# Patient Record
Sex: MALE | Race: WHITE | NOT HISPANIC OR LATINO | Employment: OTHER | URBAN - METROPOLITAN AREA
[De-identification: names, ages, dates, MRNs, and addresses within clinical notes are randomized per-mention and may not be internally consistent; named-entity substitution may affect disease eponyms.]

---

## 2020-01-13 ENCOUNTER — TELEPHONE (OUTPATIENT)
Dept: FAMILY MEDICINE CLINIC | Facility: CLINIC | Age: 80
End: 2020-01-13

## 2020-01-13 NOTE — TELEPHONE ENCOUNTER
Pt has made an appointment and signed release at previous office.     Pt is requesting hydrocodone 10-325mg take 4 daily to [facility].

## 2020-01-14 DIAGNOSIS — M16.9 HIP ARTHROSIS: Primary | ICD-10-CM

## 2020-01-14 RX ORDER — HYDROCODONE BITARTRATE AND ACETAMINOPHEN 10; 325 MG/1; MG/1
1 TABLET ORAL EVERY 6 HOURS PRN
Qty: 120 TABLET | Refills: 0 | Status: SHIPPED | OUTPATIENT
Start: 2020-01-14 | End: 2020-02-12 | Stop reason: SDUPTHER

## 2020-01-16 ENCOUNTER — PRIOR AUTHORIZATION (OUTPATIENT)
Dept: FAMILY MEDICINE CLINIC | Facility: CLINIC | Age: 80
End: 2020-01-16

## 2020-01-30 RX ORDER — HYDROCHLOROTHIAZIDE 12.5 MG/1
12.5 TABLET ORAL DAILY
Qty: 90 TABLET | Refills: 3 | Status: SHIPPED | OUTPATIENT
Start: 2020-01-30 | End: 2021-01-25 | Stop reason: SDUPTHER

## 2020-02-12 ENCOUNTER — OFFICE VISIT (OUTPATIENT)
Dept: FAMILY MEDICINE CLINIC | Facility: CLINIC | Age: 80
End: 2020-02-12

## 2020-02-12 VITALS
DIASTOLIC BLOOD PRESSURE: 82 MMHG | HEART RATE: 88 BPM | RESPIRATION RATE: 12 BRPM | TEMPERATURE: 98.1 F | WEIGHT: 206 LBS | BODY MASS INDEX: 33.11 KG/M2 | OXYGEN SATURATION: 97 % | SYSTOLIC BLOOD PRESSURE: 130 MMHG | HEIGHT: 66 IN

## 2020-02-12 DIAGNOSIS — F41.1 GAD (GENERALIZED ANXIETY DISORDER): ICD-10-CM

## 2020-02-12 DIAGNOSIS — N18.30 STAGE 3 CHRONIC KIDNEY DISEASE (HCC): ICD-10-CM

## 2020-02-12 DIAGNOSIS — N18.30 CHRONIC KIDNEY DISEASE, STAGE 3 (MODERATE): ICD-10-CM

## 2020-02-12 DIAGNOSIS — Z79.899 HIGH RISK MEDICATION USE: ICD-10-CM

## 2020-02-12 DIAGNOSIS — M48.061 DEGENERATIVE LUMBAR SPINAL STENOSIS: Primary | ICD-10-CM

## 2020-02-12 DIAGNOSIS — F32.0 CURRENT MILD EPISODE OF MAJOR DEPRESSIVE DISORDER WITHOUT PRIOR EPISODE (HCC): ICD-10-CM

## 2020-02-12 DIAGNOSIS — M16.9 HIP ARTHROSIS: ICD-10-CM

## 2020-02-12 DIAGNOSIS — E55.9 VITAMIN D DEFICIENCY: ICD-10-CM

## 2020-02-12 DIAGNOSIS — G47.33 OBSTRUCTIVE SLEEP APNEA SYNDROME: ICD-10-CM

## 2020-02-12 DIAGNOSIS — R53.83 OTHER FATIGUE: ICD-10-CM

## 2020-02-12 DIAGNOSIS — I10 ESSENTIAL HYPERTENSION: ICD-10-CM

## 2020-02-12 PROBLEM — F32.9 CURRENT EPISODE OF MAJOR DEPRESSIVE DISORDER WITHOUT PRIOR EPISODE: Status: ACTIVE | Noted: 2020-02-12

## 2020-02-12 PROBLEM — J43.8 OTHER EMPHYSEMA: Status: ACTIVE | Noted: 2020-02-12

## 2020-02-12 PROBLEM — R60.9 EDEMA: Status: ACTIVE | Noted: 2020-02-12

## 2020-02-12 PROBLEM — M1A.09X0 CHRONIC IDIOPATHIC GOUT OF MULTIPLE SITES: Status: ACTIVE | Noted: 2020-02-12

## 2020-02-12 PROBLEM — G47.30 SLEEP APNEA: Status: ACTIVE | Noted: 2020-02-12

## 2020-02-12 PROBLEM — K21.9 GERD (GASTROESOPHAGEAL REFLUX DISEASE): Status: ACTIVE | Noted: 2020-02-12

## 2020-02-12 PROCEDURE — 99214 OFFICE O/P EST MOD 30 MIN: CPT | Performed by: FAMILY MEDICINE

## 2020-02-12 RX ORDER — OMEPRAZOLE 20 MG/1
20 CAPSULE, DELAYED RELEASE ORAL DAILY
COMMUNITY
Start: 2019-12-10 | End: 2020-05-19 | Stop reason: SDUPTHER

## 2020-02-12 RX ORDER — ROSUVASTATIN CALCIUM 20 MG/1
20 TABLET, COATED ORAL DAILY
COMMUNITY
End: 2020-11-20 | Stop reason: SDUPTHER

## 2020-02-12 RX ORDER — ALLOPURINOL 100 MG/1
100 TABLET ORAL DAILY
COMMUNITY
Start: 2020-02-05 | End: 2020-04-23 | Stop reason: SDUPTHER

## 2020-02-12 RX ORDER — HYDROCODONE BITARTRATE AND ACETAMINOPHEN 10; 325 MG/1; MG/1
1 TABLET ORAL EVERY 6 HOURS PRN
Qty: 120 TABLET | Refills: 0 | Status: SHIPPED | OUTPATIENT
Start: 2020-02-12 | End: 2020-03-16 | Stop reason: SDUPTHER

## 2020-02-12 RX ORDER — SPIRONOLACTONE 25 MG/1
25 TABLET ORAL DAILY
COMMUNITY
Start: 2019-12-20 | End: 2020-05-19 | Stop reason: SDUPTHER

## 2020-02-12 RX ORDER — ASPIRIN 325 MG
325 TABLET ORAL DAILY
COMMUNITY
Start: 2014-01-30

## 2020-02-12 RX ORDER — DULOXETIN HYDROCHLORIDE 30 MG/1
30 CAPSULE, DELAYED RELEASE ORAL DAILY
COMMUNITY
Start: 2019-11-05 | End: 2020-03-16 | Stop reason: SDUPTHER

## 2020-02-12 RX ORDER — FUROSEMIDE 20 MG/1
20 TABLET ORAL DAILY
COMMUNITY
Start: 2020-02-05 | End: 2020-11-20

## 2020-02-12 RX ORDER — UBIDECARENONE 100 MG
100 CAPSULE ORAL DAILY
COMMUNITY

## 2020-02-12 RX ORDER — POTASSIUM CHLORIDE 750 MG/1
10 TABLET, EXTENDED RELEASE ORAL DAILY
COMMUNITY
Start: 2020-02-05 | End: 2023-01-31

## 2020-02-12 RX ORDER — MULTIVITAMIN WITH IRON
2 TABLET ORAL DAILY
COMMUNITY

## 2020-02-12 RX ORDER — CHLORAL HYDRATE 500 MG
CAPSULE ORAL
COMMUNITY

## 2020-02-12 RX ORDER — CHOLECALCIFEROL (VITAMIN D3) 50 MCG
2000 TABLET ORAL DAILY
COMMUNITY

## 2020-02-12 NOTE — PROGRESS NOTES
"Subjective   Kike Rubio is a 79 y.o. male.     Vitals:    02/12/20 1351   BP: 130/82   Pulse: 88   Resp: 12   Temp: 98.1 °F (36.7 °C)   SpO2: 97%        Chief Complaint   Patient presents with   • Establish Care     patient is establishing a care   • Hypertension     patient has a follow up   • Arthritis        History of Present Illness    The following portions of the patient's history were reviewed and updated as appropriate: allergies, current medications, past family history, past medical history, past social history, past surgical history and problem list.    2-month follow-up and chronic low back pain, kidney disease, and new complaints of fatigue and depressed mood  ----also, to establish care with me here at [facility]    Last office visit 12/2019 at [place] at that time we are following up on lipids and renal function.  Lipids within normal limits and no changes were made to medications however patient's renal function deteriorated slightly.  Thus, he was asked to DC his Mobic or take very sparingly as he has been asked to stay off this in the past.  However, his arthritis is so bad that he still at times take the Mobic.  Since last office visit he is taking it about 3-4 times.  For this reason he continues to also take his hydrocodone 4 times a day for his chronic lumbar degenerative disc disease and severe osteoarthritis of knees and hips.  He currently undergoes epidural injections with Dr. Phillips which are successful but only seem to last for 1 to 2 months.  He does have a follow-up next week with him and will be considering another MRI and possibly other type of therapeutic injections.    Most recently Kike is been feeling a little more fatigued, depressed mood, and decreased motivation.  It has been a stressful time with his wife undergoing a lot of testing for \"myeloproliferative disorder\".  However, usually Kike can get up and go and has no problem getting his daily tasks done.  The symptoms have " been going on approximately 2 to 3 months now.  Denies changes in weight, chest pain, shortness of air.  There is to be sleeping more during the daytime.  Compliant with his CPAP for his obstructive sleep apnea which does help his fatigue.    Review of Systems   Constitutional: Positive for fatigue. Negative for unexpected weight gain and unexpected weight loss.   Respiratory: Negative for shortness of breath.    Cardiovascular: Negative for chest pain.   Musculoskeletal: Positive for arthralgias and back pain.   I have reviewed and confirmed the accuracy of the ROS as documented by the MA/LPN/RN Kina Morin MD      Objective   Physical Exam   Constitutional: He appears well-developed and well-nourished.   HENT:   Head: Normocephalic and atraumatic.   Eyes: Pupils are equal, round, and reactive to light. Conjunctivae are normal.   Neck: Normal range of motion. Neck supple. No thyromegaly present.   Cardiovascular: Normal rate, regular rhythm and normal heart sounds.   Pulmonary/Chest: Effort normal and breath sounds normal.   Abdominal: Soft. Bowel sounds are normal. He exhibits no distension. There is no hepatosplenomegaly. There is no tenderness.   Musculoskeletal:   Bilateral lower extremities notable for trace to 1+ edema, this is relatively stable if not improved   Lymphadenopathy:     He has no cervical adenopathy.   Psychiatric: He has a normal mood and affect. His behavior is normal. Judgment and thought content normal.   Nursing note and vitals reviewed.       LABS/STUDIES  --- 12/2019 lipids within normal limits, CMP within normal limits except for creatinine of 1.6      Assessment/Plan   Kike was seen today for establish care, hypertension and arthritis.    Diagnoses and all orders for this visit:    Degenerative lumbar spinal stenosis  ----Kunal done, tox screen will be updated today.  No change to medication/refill Norco quantity 120, continue with pain management for therapeutic injections  -      ToxASSURE Select 13 Discrete -    Hip arthrosis  -     HYDROcodone-acetaminophen (NORCO)  MG per tablet; Take 1 tablet by mouth Every 6 (Six) Hours As Needed for Moderate Pain .    Stage 3 chronic kidney disease (CMS/HCC)  --- stable?  Again, reiterate to avoid all anti-inflammatories.  Recheck BMP today  -     Basic Metabolic Panel    High risk medication use  --- will update yearly urine tox for compliance/last urine tox 3/2019  -     ToxASSURE Select 13 Discrete -    Other fatigue  --- new diagnosis, suspect multifactorial secondary to chronic kidney disease, age, medications.  Yet rule out other etiologies with some labs and treat mild MDD  -     CBC & Differential  -     TSH  -     Vitamin B12 & Folate  -     Vitamin D 25 Hydroxy    Vitamin D deficiency    Essential hypertension    FRAN (generalized anxiety disorder)    Obstructive sleep apnea syndrome    Current mild episode of major depressive disorder without prior episode (CMS/HCC)  --- new diagnosis, will increase Cymbalta to 60 mg 1 p.o. daily    Chronic kidney disease, stage 3 (moderate) (CMS/HCC)   -     Vitamin D 25 Hydroxy                   Return in about 3 months (around 5/12/2020).

## 2020-02-17 LAB
25(OH)D3+25(OH)D2 SERPL-MCNC: 56.1 NG/ML (ref 30–100)
6MAM UR QL CFM: NEGATIVE
6MAM/CREAT UR: NOT DETECTED NG/MG CREAT
7AMINOCLONAZEPAM/CREAT UR: NOT DETECTED NG/MG CREAT
A-OH ALPRAZ/CREAT UR: NOT DETECTED NG/MG CREAT
A-OH-TRIAZOLAM/CREAT UR CFM: NOT DETECTED NG/MG CREAT
ALFENTANIL/CREAT UR CFM: NOT DETECTED NG/MG CREAT
ALPHA-HYDROXYMIDAZOLAM, URINE: NOT DETECTED NG/MG CREAT
ALPRAZ/CREAT UR CFM: NOT DETECTED NG/MG CREAT
AMOBARBITAL UR QL CFM: NOT DETECTED
AMPHET/CREAT UR: NOT DETECTED NG/MG CREAT
AMPHETAMINES UR QL CFM: NEGATIVE
BARBITAL UR QL CFM: NOT DETECTED
BARBITURATES UR QL CFM: NEGATIVE
BASOPHILS # BLD AUTO: 0.1 X10E3/UL (ref 0–0.2)
BASOPHILS NFR BLD AUTO: 1 %
BENZODIAZ UR QL CFM: NEGATIVE
BUN SERPL-MCNC: 17 MG/DL (ref 8–27)
BUN/CREAT SERPL: 12 (ref 10–24)
BUPRENORPHINE UR QL CFM: NEGATIVE
BUPRENORPHINE/CREAT UR: NOT DETECTED NG/MG CREAT
BUTABARBITAL UR QL CFM: NOT DETECTED
BUTALBITAL UR QL CFM: NOT DETECTED
BZE/CREAT UR: NOT DETECTED NG/MG CREAT
CALCIUM SERPL-MCNC: 9.8 MG/DL (ref 8.6–10.2)
CANNABINOIDS UR QL CFM: NEGATIVE
CARBOXYTHC/CREAT UR: NOT DETECTED NG/MG CREAT
CHLORIDE SERPL-SCNC: 96 MMOL/L (ref 96–106)
CLONAZEPAM/CREAT UR CFM: NOT DETECTED NG/MG CREAT
CO2 SERPL-SCNC: 28 MMOL/L (ref 20–29)
COCAETHYLENE/CREAT UR CFM: NOT DETECTED NG/MG CREAT
COCAINE UR QL CFM: NEGATIVE
COCAINE/CREAT UR CFM: NOT DETECTED NG/MG CREAT
CODEINE/CREAT UR: NOT DETECTED NG/MG CREAT
CREAT SERPL-MCNC: 1.43 MG/DL (ref 0.76–1.27)
CREAT UR-MCNC: 76 MG/DL
DESALKYLFLURAZ/CREAT UR: NOT DETECTED NG/MG CREAT
DESMETHYLFLUNITRAZEPAM: NOT DETECTED NG/MG CREAT
DHC/CREAT UR: 409 NG/MG CREAT
DIAZEPAM/CREAT UR: NOT DETECTED NG/MG CREAT
DRUGS UR: NORMAL
EDDP/CREAT UR: NOT DETECTED NG/MG CREAT
EOSINOPHIL # BLD AUTO: 0.2 X10E3/UL (ref 0–0.4)
EOSINOPHIL NFR BLD AUTO: 2 %
ERYTHROCYTE [DISTWIDTH] IN BLOOD BY AUTOMATED COUNT: 14 % (ref 11.6–15.4)
ETHANOL UR CFM-MCNC: NOT DETECTED G/DL
ETHANOL UR QL CFM: NEGATIVE
FENTANYL UR QL CFM: NEGATIVE
FENTANYL/CREAT UR: NOT DETECTED NG/MG CREAT
FLUNITRAZEPAM UR QL CFM: NOT DETECTED NG/MG CREAT
FOLATE SERPL-MCNC: >20 NG/ML
GLUCOSE SERPL-MCNC: 94 MG/DL (ref 65–99)
HCT VFR BLD AUTO: 44 % (ref 37.5–51)
HGB BLD-MCNC: 15 G/DL (ref 13–17.7)
HYDROCODONE/CREAT UR: 1393 NG/MG CREAT
HYDROMORPHONE/CREAT UR: 530 NG/MG CREAT
IMM GRANULOCYTES # BLD AUTO: 0 X10E3/UL (ref 0–0.1)
IMM GRANULOCYTES NFR BLD AUTO: 0 %
LEVEL OF DETECTION:: NORMAL
LORAZEPAM/CREAT UR: NOT DETECTED NG/MG CREAT
LYMPHOCYTES # BLD AUTO: 1.8 X10E3/UL (ref 0.7–3.1)
LYMPHOCYTES NFR BLD AUTO: 26 %
MCH RBC QN AUTO: 33.1 PG (ref 26.6–33)
MCHC RBC AUTO-ENTMCNC: 34.1 G/DL (ref 31.5–35.7)
MCV RBC AUTO: 97 FL (ref 79–97)
MDA/CREAT UR: NOT DETECTED NG/MG CREAT
MDMA/CREAT UR: NOT DETECTED NG/MG CREAT
MEPHOBARBITAL UR QL CFM: NOT DETECTED
METHADONE UR QL CFM: NEGATIVE
METHADONE/CREAT UR: NOT DETECTED NG/MG CREAT
METHAMPHET/CREAT UR: NOT DETECTED NG/MG CREAT
MIDAZOLAM/CREAT UR CFM: NOT DETECTED NG/MG CREAT
MONOCYTES # BLD AUTO: 0.6 X10E3/UL (ref 0.1–0.9)
MONOCYTES NFR BLD AUTO: 8 %
MORPHINE/CREAT UR: NOT DETECTED NG/MG CREAT
N-NORTRAMADOL/CREAT UR CFM: NOT DETECTED NG/MG CREAT
NARCOTICS UR: NEGATIVE
NEUTROPHILS # BLD AUTO: 4.4 X10E3/UL (ref 1.4–7)
NEUTROPHILS NFR BLD AUTO: 63 %
NORBUPRENORPHINE/CREAT UR: NOT DETECTED NG/MG CREAT
NORCODEINE/CREAT UR CFM: NOT DETECTED NG/MG CREAT
NORDIAZEPAM/CREAT UR: NOT DETECTED NG/MG CREAT
NORFENTANYL/CREAT UR: NOT DETECTED NG/MG CREAT
NORHYDROCODONE/CREAT UR: 1932 NG/MG CREAT
NORMORPHINE UR-MCNC: NOT DETECTED NG/MG CREAT
NOROXYCODONE/CREAT UR: NOT DETECTED NG/MG CREAT
NOROXYMORPHONE/CREAT UR CFM: NOT DETECTED NG/MG CREAT
O-NORTRAMADOL UR CFM-MCNC: NOT DETECTED NG/MG CREAT
OPIATES UR QL CFM: NORMAL
OXAZEPAM/CREAT UR: NOT DETECTED NG/MG CREAT
OXYCODONE UR QL CFM: NEGATIVE
OXYCODONE/CREAT UR: NOT DETECTED NG/MG CREAT
OXYMORPHONE/CREAT UR: NOT DETECTED NG/MG CREAT
PENTOBARB UR QL CFM: NOT DETECTED
PHENOBARB UR QL CFM: NOT DETECTED
PLATELET # BLD AUTO: 187 X10E3/UL (ref 150–450)
POTASSIUM SERPL-SCNC: 3.9 MMOL/L (ref 3.5–5.2)
RBC # BLD AUTO: 4.53 X10E6/UL (ref 4.14–5.8)
SECOBARBITAL UR QL CFM: NOT DETECTED
SODIUM SERPL-SCNC: 141 MMOL/L (ref 134–144)
SUFENTANIL/CREAT UR CFM: NOT DETECTED NG/MG CREAT
TAPENTADOL UR QL CFM: NEGATIVE
TAPENTADOL/CREAT UR: NOT DETECTED NG/MG CREAT
TEMAZEPAM/CREAT UR: NOT DETECTED NG/MG CREAT
THIOPENTAL UR QL CFM: NOT DETECTED
TRAMADOL UR QL CFM: NOT DETECTED NG/MG CREAT
TSH SERPL DL<=0.005 MIU/L-ACNC: 1.8 UIU/ML (ref 0.45–4.5)
VIT B12 SERPL-MCNC: 365 PG/ML (ref 232–1245)
WBC # BLD AUTO: 7 X10E3/UL (ref 3.4–10.8)

## 2020-03-16 DIAGNOSIS — M16.9 HIP ARTHROSIS: ICD-10-CM

## 2020-03-16 RX ORDER — DULOXETIN HYDROCHLORIDE 30 MG/1
30 CAPSULE, DELAYED RELEASE ORAL DAILY
Qty: 90 CAPSULE | Refills: 3 | Status: SHIPPED | OUTPATIENT
Start: 2020-03-16 | End: 2021-02-24

## 2020-03-16 RX ORDER — HYDROCODONE BITARTRATE AND ACETAMINOPHEN 10; 325 MG/1; MG/1
1 TABLET ORAL EVERY 6 HOURS PRN
Qty: 120 TABLET | Refills: 0 | Status: SHIPPED | OUTPATIENT
Start: 2020-03-16 | End: 2020-04-14 | Stop reason: SDUPTHER

## 2020-04-10 ENCOUNTER — TELEPHONE (OUTPATIENT)
Dept: FAMILY MEDICINE CLINIC | Facility: CLINIC | Age: 80
End: 2020-04-10

## 2020-04-14 DIAGNOSIS — L03.90 CELLULITIS, UNSPECIFIED CELLULITIS SITE: Primary | ICD-10-CM

## 2020-04-14 DIAGNOSIS — M16.9 HIP ARTHROSIS: ICD-10-CM

## 2020-04-14 RX ORDER — AMOXICILLIN AND CLAVULANATE POTASSIUM 875; 125 MG/1; MG/1
1 TABLET, FILM COATED ORAL 2 TIMES DAILY
Qty: 20 TABLET | Refills: 0 | Status: SHIPPED | OUTPATIENT
Start: 2020-04-14 | End: 2020-05-19

## 2020-04-14 NOTE — TELEPHONE ENCOUNTER
Okay to call in Augmentin 875 mg 1 p.o. twice daily x10 days for patient's cellulitis.  First confirm there is no allergies to penicillin.  According to this allergy list it just says 2?     I thought I okayed this yesterday.  Request is coming from the hub so not certain if this refill has been done

## 2020-04-14 NOTE — TELEPHONE ENCOUNTER
PATIENT STATES THAT HE HAS CELLUTITIS AGAIN AND WOULD LIKE ANABIOTICS CALL IN TO THE PHARMACY.PATIENT STATES HE DOES NOT HAVE MATERIALS FOR E- VISITS.  PATIENT IS ALSO REQUESTING A REFILL ON HYDROcodone-acetaminophen (NORCO)  MG per tablet    PLEASE SEND RX TO [id number] - [place], [place] - [address] AT [facility] & [facility] - [phone] [facility] [phone] FX

## 2020-04-15 RX ORDER — HYDROCODONE BITARTRATE AND ACETAMINOPHEN 10; 325 MG/1; MG/1
1 TABLET ORAL EVERY 6 HOURS PRN
Qty: 120 TABLET | Refills: 0 | Status: SHIPPED | OUTPATIENT
Start: 2020-04-15 | End: 2020-05-19 | Stop reason: SDUPTHER

## 2020-04-23 RX ORDER — ALLOPURINOL 100 MG/1
100 TABLET ORAL DAILY
Qty: 90 TABLET | Refills: 1 | Status: SHIPPED | OUTPATIENT
Start: 2020-04-23 | End: 2020-10-23

## 2020-04-24 ENCOUNTER — TELEPHONE (OUTPATIENT)
Dept: FAMILY MEDICINE CLINIC | Facility: CLINIC | Age: 80
End: 2020-04-24

## 2020-04-24 NOTE — TELEPHONE ENCOUNTER
PATIENT CALLED STATING THAT HE HAS BEEN TAKING AN ANTIBIOTIC, PRESCRIBED BY DR. GUILLERMO, FOR CELLULITIS.    THE PATIENT STATED THAT HE NOW BELIEVES THAT HE HAS THRUSH (PER THE OPINION OF HIS DAUGHTER WHO IS A RETIRED EMT), AND HE REQUESTED FOR DR. GUILLERMO TO WRITE HIM A PRESCRIPTION FOR SOMETHING TO TREAT IT.    THE PATIENT STATED THAT HIS TONGUE IS WHITE AND IT HAS BEEN BURNING FOR THE LAST 4 DAYS. THE PATIENT STATED THAT HE HAS BEEN TAKING A PROBIOTIC, BUT IT HAS NOT HELPED HIM.    I CONFIRMED THE CORRECT PHARMACY WITH THE PATIENT TO BE [facility] ON [address]    IF THERE ARE ANY QUESTIONS OR CONCERNS PLEASE CALL THE PATIENT -[phone] OR THE PHARMACY AT [facility] -[phone].

## 2020-04-27 ENCOUNTER — OFFICE VISIT (OUTPATIENT)
Dept: FAMILY MEDICINE CLINIC | Facility: CLINIC | Age: 80
End: 2020-04-27

## 2020-04-27 DIAGNOSIS — B37.0 ORAL THRUSH: Primary | ICD-10-CM

## 2020-04-27 PROCEDURE — 99441 PR PHYS/QHP TELEPHONE EVALUATION 5-10 MIN: CPT | Performed by: FAMILY MEDICINE

## 2020-04-27 NOTE — PROGRESS NOTES
This visit was completed as a telephone visit secondary to the COVID-19 pandemic following the CDC recommendation  for social distancing and to limit interpersonal contact including in the office setting.    Patient Verbal Consent: You have chosen to receive care through a telephone visit. Do you consent to use a telephone visit for your medical care today? Yes    HPI: Patient is a pleasant 80 yo man with CKD (stage 3) who presents with complaints of burning in mouth, loss of sense of taste, and white coating. Symptoms have been present for about a week. He has recently been on a course of antibiotics for cellulitis which he finished two days ago, symptoms had started a couple days before this. His daughter who examined his mouth ( worked in healthcare) thought that it looked like thrush. He has never had this problem before. He denies sore throat.     Assessment and Plan - MDM  Patient's symptoms and description of appearance are most consistent with thrush - will send in prescription for liquid nystatin for treatment - instructed patient on how to use it - recommended that if he did not have symptom resolution within the week to follow up.     Total Time Spent - 5 minutes

## 2020-05-19 ENCOUNTER — OFFICE VISIT (OUTPATIENT)
Dept: FAMILY MEDICINE CLINIC | Facility: CLINIC | Age: 80
End: 2020-05-19

## 2020-05-19 VITALS
HEIGHT: 66 IN | TEMPERATURE: 97.8 F | WEIGHT: 205 LBS | OXYGEN SATURATION: 97 % | BODY MASS INDEX: 32.95 KG/M2 | HEART RATE: 74 BPM | RESPIRATION RATE: 16 BRPM | DIASTOLIC BLOOD PRESSURE: 66 MMHG | SYSTOLIC BLOOD PRESSURE: 124 MMHG

## 2020-05-19 DIAGNOSIS — R60.0 LOCALIZED EDEMA: ICD-10-CM

## 2020-05-19 DIAGNOSIS — N18.30 STAGE 3 CHRONIC KIDNEY DISEASE (HCC): ICD-10-CM

## 2020-05-19 DIAGNOSIS — I10 ESSENTIAL HYPERTENSION: ICD-10-CM

## 2020-05-19 DIAGNOSIS — M16.9 HIP ARTHROSIS: ICD-10-CM

## 2020-05-19 DIAGNOSIS — K21.9 GASTROESOPHAGEAL REFLUX DISEASE WITHOUT ESOPHAGITIS: ICD-10-CM

## 2020-05-19 DIAGNOSIS — F41.8 SITUATIONAL ANXIETY: ICD-10-CM

## 2020-05-19 DIAGNOSIS — N62 GYNECOMASTIA, MALE: ICD-10-CM

## 2020-05-19 DIAGNOSIS — B37.0 THRUSH: ICD-10-CM

## 2020-05-19 DIAGNOSIS — F41.8 ANXIETY WITH DEPRESSION: ICD-10-CM

## 2020-05-19 DIAGNOSIS — M48.061 DEGENERATIVE LUMBAR SPINAL STENOSIS: Primary | ICD-10-CM

## 2020-05-19 DIAGNOSIS — E78.2 MIXED HYPERLIPIDEMIA: ICD-10-CM

## 2020-05-19 PROBLEM — F41.1 GAD (GENERALIZED ANXIETY DISORDER): Status: RESOLVED | Noted: 2020-02-12 | Resolved: 2020-05-19

## 2020-05-19 PROBLEM — R53.83 OTHER FATIGUE: Status: RESOLVED | Noted: 2020-02-12 | Resolved: 2020-05-19

## 2020-05-19 PROBLEM — F32.9 CURRENT EPISODE OF MAJOR DEPRESSIVE DISORDER WITHOUT PRIOR EPISODE: Status: RESOLVED | Noted: 2020-02-12 | Resolved: 2020-05-19

## 2020-05-19 PROCEDURE — 99214 OFFICE O/P EST MOD 30 MIN: CPT | Performed by: FAMILY MEDICINE

## 2020-05-19 RX ORDER — SPIRONOLACTONE 25 MG/1
25 TABLET ORAL DAILY
Qty: 90 TABLET | Refills: 1 | Status: SHIPPED | OUTPATIENT
Start: 2020-05-19 | End: 2020-08-21 | Stop reason: SDUPTHER

## 2020-05-19 RX ORDER — OMEPRAZOLE 20 MG/1
20 CAPSULE, DELAYED RELEASE ORAL DAILY
Qty: 90 CAPSULE | Refills: 1 | Status: SHIPPED | OUTPATIENT
Start: 2020-05-19 | End: 2020-12-21

## 2020-05-19 RX ORDER — DIAZEPAM 5 MG/1
5 TABLET ORAL 2 TIMES DAILY PRN
Qty: 5 TABLET | Refills: 0 | Status: SHIPPED | OUTPATIENT
Start: 2020-05-19 | End: 2021-12-21

## 2020-05-19 RX ORDER — HYDROCODONE BITARTRATE AND ACETAMINOPHEN 10; 325 MG/1; MG/1
1 TABLET ORAL EVERY 6 HOURS PRN
Qty: 120 TABLET | Refills: 0 | Status: SHIPPED | OUTPATIENT
Start: 2020-05-19 | End: 2020-06-19 | Stop reason: SDUPTHER

## 2020-05-19 NOTE — PROGRESS NOTES
Subjective   Kike Rubio is a 79 y.o. male.     Vitals:    05/19/20 0849   BP: 124/66   Pulse: 74   Resp: 16   Temp: 97.8 °F (36.6 °C)   SpO2: 97%        Chief Complaint   Patient presents with   • Back Pain     patient needs refill on med   • Thrush     patient had Thrush needs to check on it.   • Headache     Patient needs to get med for clusterphobic he is going to get MRI        History of Present Illness    3-month follow-up for chronic back pain/hip pain, hypertension, hyperlipidemia, GERD, thrush, and new complaints right breast tenderness and meds for claustrophobia    Last office visit patient's Cymbalta was increased from 30 mg to 60 mg due to uncontrolled anxiety and depression.  However, patient did not like the side effects of this increased dosage thus went back to 30 mg.  Currently, he is doing fine on 30 mg.  Except that he has an upcoming MRI next week for which she gets claustrophobic and is requesting a medicine to calm down for that procedure.    He does complain of right breast tenderness and discomfort for several months.  No palpable masses.  He has noticed his left breast is starting to get tender as well.  Negative family history of male breast cancer.  Note, patient is on long-term spironolactone    He would like to follow-up on a recent diagnosis of thrush.  He was treated with antibiotics for recurrent left lower extremity cellulitis approximately 6 to 8 weeks ago.  He experienced thrush afterwards.  This was treated with a telemedicine visit here with nystatin.  He states this is much better, but would like it rechecked.    Patient has significant lumbar degenerative disc disease with neurogenic claudication as well as bilateral hip arthrosis for which she takes hydrocodone 10 mg a quantity of 3 to 4/day.  He is intolerant to anti-inflammatories due to his chronic kidney disease.  However, despite numerous recommendations to discontinue all anti-inflammatories patient continues to use  his Mobic on rare occasions (he has taken 5 doses of Mobic in the last 3 months) he is requesting a refill today.  Last urine tox screen was February 2020 and consistent with prescription history.  Kunal reviewed by me today shows hydrocodone 10 mg a quantity of 120 last refilled April 16, 2020.  No aberrant behavior    Patient's hyperlipidemia is well controlled with Crestor.  Denies side effects.  Due for labs  Patient's hypertension is well controlled with a combination of spironolactone, hydrochlorothiazide, and Lasix.  Due for labs.  Patient's GERD is well controlled with as needed omeprazole.  Needs refill    The following portions of the patient's history were reviewed and updated as appropriate: allergies, current medications, past family history, past medical history, past social history, past surgical history and problem list.    Review of Systems   Constitutional: Negative for unexpected weight gain and unexpected weight loss.   Respiratory: Negative for shortness of breath.    Cardiovascular: Negative for chest pain.   Musculoskeletal: Positive for back pain.       Objective   Physical Exam   Constitutional: He appears well-developed.   HENT:   Head: Normocephalic and atraumatic.   No evidence of thrush   Eyes: Pupils are equal, round, and reactive to light. Conjunctivae are normal.   Neck: Normal range of motion. Neck supple. No thyromegaly present.   Cardiovascular: Normal rate, regular rhythm and normal heart sounds.   Pulmonary/Chest: Effort normal and breath sounds normal.   Bilateral gynecomastia, right greater than left, no palpable masses   Abdominal: Soft. Bowel sounds are normal. He exhibits no distension. There is no hepatosplenomegaly. There is no tenderness.   Musculoskeletal: He exhibits no edema.   Chronic 1+ brawny edema left lower extremity   Lymphadenopathy:     He has no cervical adenopathy.   Psychiatric: He has a normal mood and affect. His behavior is normal. Judgment and thought  content normal.   Nursing note and vitals reviewed.       LABS/STUDIES --February 2020 creatinine 1.4, normal TSH, normal B12, normal vitamin D    Procedures     Assessment/Plan   Kike was seen today for back pain, thrush and headache.    Diagnoses and all orders for this visit:    Degenerative lumbar spinal stenosis --stable.  Urine tox February 2020, Kunal reviewed, contract up-to-date.  No change in medication.  Will refill hydrocodone 10 mg quantity of 120 per 30 days.    Gynecomastia, male --new diagnosis.  Most likely secondary to long-term spironolactone.  Reassurance given    Hip arthrosis  -     HYDROcodone-acetaminophen (NORCO)  MG per tablet; Take 1 tablet by mouth Every 6 (Six) Hours As Needed for Moderate Pain .    Stage 3 chronic kidney disease (CMS/HCC) --stable.  Continue to avoid all anti-inflammatories, very rare as needed use of Mobic    Essential hypertension --stable.  No change in medication.  Will refill upon request.  Order given to check CMP and lipids  -     Lipid Panel With LDL / HDL Ratio; Future    Mixed hyperlipidemia --stable.  Order given to check CMP and lipids.  Goal LDL less than 130 then no change to Crestor.  Will refill Crestor 20 mg 1 p.o. daily upon request  -     Comprehensive Metabolic Panel; Future  -     Lipid Panel With LDL / HDL Ratio; Future    Gastroesophageal reflux disease without esophagitis --stable.  Refill omeprazole 20 mg 1 p.o. daily as needed    Situational anxiety --new diagnosis.  Will prescribe Valium 5 mg 1-2 p.o. 30 minutes prior to procedure PRN.  -     diazePAM (Valium) 5 MG tablet; Take 1 tablet by mouth 2 (Two) Times a Day As Needed for Anxiety.    Anxiety with depression --stable.  No change to Cymbalta 30 mg 1 p.o. daily.  Will refill upon request    Localized edema --stable.  No change to diuretics, will refill upon request    Thrush --resolved    Other orders  -     spironolactone (ALDACTONE) 25 MG tablet; Take 1 tablet by mouth Daily.  -      omeprazole (priLOSEC) 20 MG capsule; Take 1 capsule by mouth Daily.                 Return in about 3 months (around 8/19/2020) for Medicare Wellness.

## 2020-05-27 DIAGNOSIS — E78.2 MIXED HYPERLIPIDEMIA: ICD-10-CM

## 2020-05-27 DIAGNOSIS — I10 ESSENTIAL HYPERTENSION: ICD-10-CM

## 2020-06-03 LAB
ALBUMIN SERPL-MCNC: 4.1 G/DL (ref 3.7–4.7)
ALBUMIN/GLOB SERPL: 1.7 {RATIO} (ref 1.2–2.2)
ALP SERPL-CCNC: 100 IU/L (ref 39–117)
ALT SERPL-CCNC: 17 IU/L (ref 0–44)
AST SERPL-CCNC: 27 IU/L (ref 0–40)
BILIRUB SERPL-MCNC: 0.7 MG/DL (ref 0–1.2)
BUN SERPL-MCNC: 22 MG/DL (ref 8–27)
BUN/CREAT SERPL: 14 (ref 10–24)
CALCIUM SERPL-MCNC: 9.7 MG/DL (ref 8.6–10.2)
CHLORIDE SERPL-SCNC: 101 MMOL/L (ref 96–106)
CHOLEST SERPL-MCNC: 176 MG/DL (ref 100–199)
CO2 SERPL-SCNC: 27 MMOL/L (ref 20–29)
CREAT SERPL-MCNC: 1.57 MG/DL (ref 0.76–1.27)
GLOBULIN SER CALC-MCNC: 2.4 G/DL (ref 1.5–4.5)
GLUCOSE SERPL-MCNC: 96 MG/DL (ref 65–99)
HDLC SERPL-MCNC: 49 MG/DL
LDLC SERPL CALC-MCNC: 102 MG/DL (ref 0–99)
LDLC/HDLC SERPL: 2.1 RATIO (ref 0–3.6)
POTASSIUM SERPL-SCNC: 4.9 MMOL/L (ref 3.5–5.2)
PROT SERPL-MCNC: 6.5 G/DL (ref 6–8.5)
SODIUM SERPL-SCNC: 143 MMOL/L (ref 134–144)
TRIGL SERPL-MCNC: 127 MG/DL (ref 0–149)
VLDLC SERPL CALC-MCNC: 25 MG/DL (ref 5–40)

## 2020-06-19 DIAGNOSIS — M16.9 HIP ARTHROSIS: ICD-10-CM

## 2020-06-19 NOTE — TELEPHONE ENCOUNTER
DELETE AFTER REVIEWING: Telephone encounter to be sent to the clinical pool.  If patient has less than a 3 day supply left, send the encounter HIGH Priority.    Caller: Kike Rubio    Relationship: Self    Best call back number: [phone]    Medication needed:   Requested Prescriptions     Pending Prescriptions Disp Refills   • HYDROcodone-acetaminophen (NORCO)  MG per tablet 120 tablet 0     Sig: Take 1 tablet by mouth Every 6 (Six) Hours As Needed for Moderate Pain .       When do you need the refill by: PT HAS 3 DAYS LEFT      Does the patient have less than a 3 day supply:  [] Yes  [x] No    What is the patient's preferred pharmacy:    [id number] - [place] [id number] [address] AT [facility] - [phone] [facility] [phone] FX

## 2020-06-21 RX ORDER — HYDROCODONE BITARTRATE AND ACETAMINOPHEN 10; 325 MG/1; MG/1
1 TABLET ORAL EVERY 6 HOURS PRN
Qty: 120 TABLET | Refills: 0 | Status: SHIPPED | OUTPATIENT
Start: 2020-06-21 | End: 2020-07-15 | Stop reason: SDUPTHER

## 2020-07-15 DIAGNOSIS — M16.9 HIP ARTHROSIS: ICD-10-CM

## 2020-07-15 RX ORDER — HYDROCODONE BITARTRATE AND ACETAMINOPHEN 10; 325 MG/1; MG/1
1 TABLET ORAL EVERY 6 HOURS PRN
Qty: 120 TABLET | Refills: 0 | Status: CANCELLED | OUTPATIENT
Start: 2020-07-15

## 2020-07-18 RX ORDER — HYDROCODONE BITARTRATE AND ACETAMINOPHEN 10; 325 MG/1; MG/1
1 TABLET ORAL EVERY 6 HOURS PRN
Qty: 120 TABLET | Refills: 0 | Status: SHIPPED | OUTPATIENT
Start: 2020-07-18 | End: 2020-08-21 | Stop reason: SDUPTHER

## 2020-08-21 ENCOUNTER — OFFICE VISIT (OUTPATIENT)
Dept: FAMILY MEDICINE CLINIC | Facility: CLINIC | Age: 80
End: 2020-08-21

## 2020-08-21 VITALS
HEIGHT: 66 IN | SYSTOLIC BLOOD PRESSURE: 146 MMHG | TEMPERATURE: 97.8 F | WEIGHT: 200 LBS | BODY MASS INDEX: 32.14 KG/M2 | DIASTOLIC BLOOD PRESSURE: 82 MMHG | OXYGEN SATURATION: 98 % | HEART RATE: 75 BPM

## 2020-08-21 DIAGNOSIS — M16.9 HIP ARTHROSIS: ICD-10-CM

## 2020-08-21 DIAGNOSIS — Z00.00 ENCOUNTER FOR MEDICARE ANNUAL WELLNESS EXAM: Primary | ICD-10-CM

## 2020-08-21 DIAGNOSIS — M48.061 DEGENERATIVE LUMBAR SPINAL STENOSIS: ICD-10-CM

## 2020-08-21 DIAGNOSIS — I10 ESSENTIAL HYPERTENSION: ICD-10-CM

## 2020-08-21 DIAGNOSIS — E78.2 MIXED HYPERLIPIDEMIA: ICD-10-CM

## 2020-08-21 DIAGNOSIS — I87.8 VENOUS STASIS OF LOWER EXTREMITY: ICD-10-CM

## 2020-08-21 DIAGNOSIS — N18.30 STAGE 3 CHRONIC KIDNEY DISEASE (HCC): ICD-10-CM

## 2020-08-21 PROBLEM — B37.0 THRUSH: Status: RESOLVED | Noted: 2020-05-19 | Resolved: 2020-08-21

## 2020-08-21 PROBLEM — R60.9 EDEMA: Status: RESOLVED | Noted: 2020-02-12 | Resolved: 2020-08-21

## 2020-08-21 PROBLEM — Z79.899 HIGH RISK MEDICATION USE: Status: RESOLVED | Noted: 2020-02-12 | Resolved: 2020-08-21

## 2020-08-21 PROCEDURE — G0439 PPPS, SUBSEQ VISIT: HCPCS | Performed by: FAMILY MEDICINE

## 2020-08-21 PROCEDURE — 99213 OFFICE O/P EST LOW 20 MIN: CPT | Performed by: FAMILY MEDICINE

## 2020-08-21 RX ORDER — SPIRONOLACTONE 50 MG/1
50 TABLET, FILM COATED ORAL DAILY
Qty: 90 TABLET | Refills: 1 | Status: SHIPPED | OUTPATIENT
Start: 2020-08-21 | End: 2021-03-15 | Stop reason: SDUPTHER

## 2020-08-21 RX ORDER — HYDROCODONE BITARTRATE AND ACETAMINOPHEN 10; 325 MG/1; MG/1
1 TABLET ORAL EVERY 6 HOURS PRN
Qty: 120 TABLET | Refills: 0 | Status: SHIPPED | OUTPATIENT
Start: 2020-08-21 | End: 2020-09-20 | Stop reason: SDUPTHER

## 2020-08-21 NOTE — PROGRESS NOTES
The ABCs of the Annual Wellness Visit  Subsequent Medicare Wellness Visit    Chief Complaint   Patient presents with   • Medicare Wellness-subsequent     general wellness patient is fasting    • Back Pain   • Hypertension   • Hyperlipidemia   • Edema       Subjective   History of Present Illness:  Kike Rubio is a 79 y.o. male who presents for a Subsequent Medicare Wellness Visit.    Presents for yearly wellness exam and 3-month follow-up for chronic back pain, hypertension, CKD, hyperlipidemia, and complaints of increased swelling in left lower extremity    LOV with me in May for medication refills, lab follow-up.  No changes made except for patient was asked to further decrease his use of meloxicam/nonsteroidals due to slightly progressive decline in renal function.  He has done this, only takes meloxicam on very rare occasions (has only used approximately 3 times in the last 3 months).  On a good note, things were successful in obtaining his new highly he quipped electronic wheelchair through Medicare.  He is very happy with this, as this has improved his quality of life.  He is concerned about increased swelling in his left lower extremity.  He expresses concern for some possible cellulitis?  This is the lower extremity that he always has issues with chronic edema.  Currently takes Aldactone 25 mg daily.  Only takes Lasix 20 mg as needed, is hesitant to do so due to increased urination.  Denies shortness of air, denies chest pain.    Significant lumbar DDD with stenosis and significant bilateral hip arthroses have been well controlled with hydrocodone 10 mg a quantity of 4/day.  Last urine tox screen for compliance February 2020 consistent with prescription history.  Kunal reviewed by me today shows hydrocodone 10 mg a quantity of 120 last refilled July 24, 2020.  Request refill today.    Hypertension has been fairly well controlled with a combination of hydrochlorothiazide 12.5 mg daily and Aldactone 25 mg  daily.  Denies chest pain, shortness of air.    Hyperlipidemia has been stable with Crestor 20 mg daily.  Tolerates medication without side effects.  Last lipids checked June 2020, .  Fasting today    Routine health maintenance/screening test:  Colonoscopy declined  Sees ophthalmology q. 1 to 2 years, legal blindness  No hep C antibody screen  Last tetanus greater than 10 years ago  Pneumovax 23 2011, booster in 2018  Prevnar 13 in 2016  Annual influenza in 2019  AAA screen in 2014  Tries to maintain a healthy well-balanced diet, limited exercise due to physical impairment.  Non-smoker, social alcohol only  Family history noncontributory      HEALTH RISK ASSESSMENT    Recent Hospitalizations:  No hospitalization(s) within the last year.    Current Medical Providers:  Patient Care Team:  Kina Morin MD as PCP - General (Family Medicine)    Smoking Status:  Social History     Tobacco Use   Smoking Status Never Smoker       Alcohol Consumption:  Social History     Substance and Sexual Activity   Alcohol Use Yes   • Frequency: Monthly or less    Comment: BEER OCCASIONALLY       Depression Screen:   PHQ-2/PHQ-9 Depression Screening 8/21/2020   Little interest or pleasure in doing things 0   Feeling down, depressed, or hopeless 0   Total Score 0       Fall Risk Screen:  STEADI Fall Risk Assessment was completed, and patient is at HIGH risk for falls. Assessment completed on:8/21/2020    Health Habits and Functional and Cognitive Screening:  Functional & Cognitive Status 8/21/2020   Do you have difficulty preparing food and eating? No   Do you have difficulty bathing yourself, getting dressed or grooming yourself? No   Do you have difficulty using the toilet? No   Do you have difficulty moving around from place to place? Yes   Do you have trouble with steps or getting out of a bed or a chair? Yes   Current Diet Well Balanced Diet   Dental Exam Not up to date   Eye Exam Not up to date   Exercise (times per  week) 0 times per week   Current Exercise Activities Include None   Do you need help using the phone?  No   Are you deaf or do you have serious difficulty hearing?  No   Do you need help with transportation? Yes   Do you need help shopping? Yes   Do you need help preparing meals?  Yes   Do you need help with housework?  No   Do you need help with laundry? No   Do you need help taking your medications? No   Do you need help managing money? No   Do you ever drive or ride in a car without wearing a seat belt? No         Does the patient have evidence of cognitive impairment? No    Asprin use counseling:Taking ASA appropriately as indicated    Age-appropriate Screening Schedule:  Refer to the list below for future screening recommendations based on patient's age, sex and/or medical conditions. Orders for these recommended tests are listed in the plan section. The patient has been provided with a written plan.    Health Maintenance   Topic Date Due   • TDAP/TD VACCINES (1 - Tdap) 11/01/1951   • ZOSTER VACCINE (1 of 2) 11/01/1990   • INFLUENZA VACCINE  08/01/2020   • LIPID PANEL  06/02/2021          The following portions of the patient's history were reviewed and updated as appropriate: allergies, current medications, past family history, past medical history, past social history, past surgical history and problem list.    Outpatient Medications Prior to Visit   Medication Sig Dispense Refill   • allopurinol (ZYLOPRIM) 100 MG tablet Take 1 tablet by mouth Daily. 90 tablet 1   • aspirin 325 MG tablet Take 325 mg by mouth Daily.     • Cholecalciferol (VITAMIN D) 50 MCG (2000 UT) tablet Take 2,000 Units by mouth Daily.     • coenzyme Q10 100 MG capsule Take 100 mg by mouth Daily.     • diazePAM (Valium) 5 MG tablet Take 1 tablet by mouth 2 (Two) Times a Day As Needed for Anxiety. 5 tablet 0   • DULERA 100-5 MCG/ACT inhaler Inhale 2 puffs 2 (Two) Times a Day. 13 g 5   • DULoxetine (CYMBALTA) 30 MG capsule Take 1 capsule by  mouth Daily. 90 capsule 3   • FLAXSEED, LINSEED, PO Take  by mouth.     • furosemide (LASIX) 20 MG tablet Take 20 mg by mouth Daily.     • Gluc-Chonn-MSM-Boswellia-Vit D (GLUCOSAMINE CHONDROITIN COMPLX) tablet Take  by mouth.     • hydroCHLOROthiazide (HYDRODIURIL) 12.5 MG tablet Take 1 tablet by mouth Daily. (Patient taking differently: Take 12.5 mg by mouth Every Other Day.) 90 tablet 3   • Ipratropium-Albuterol (COMBIVENT IN) Inhale 2 puffs.     • Magnesium 250 MG tablet Take 2 tablets by mouth Daily.     • nystatin (MYCOSTATIN) 217631 UNIT/ML suspension Swish and swallow 5 mL 4 (Four) Times a Day. Continue use for 2 days past symptom resolution. 150 mL 0   • Omega-3 Fatty Acids (FISH OIL) 1000 MG capsule capsule Take  by mouth.     • omeprazole (priLOSEC) 20 MG capsule Take 1 capsule by mouth Daily. 90 capsule 1   • potassium chloride (K-DUR,KLOR-CON) 10 MEQ CR tablet Take 10 mEq by mouth Daily.     • rosuvastatin (CRESTOR) 20 MG tablet Take 20 mg by mouth Daily.     • HYDROcodone-acetaminophen (NORCO)  MG per tablet Take 1 tablet by mouth Every 6 (Six) Hours As Needed for Moderate Pain . 120 tablet 0   • spironolactone (ALDACTONE) 25 MG tablet Take 1 tablet by mouth Daily. 90 tablet 1     No facility-administered medications prior to visit.        Patient Active Problem List   Diagnosis   • GERD (gastroesophageal reflux disease)   • Hypertension   • Mechanical complication of hip prosthesis (CMS/[facility])   • Sleep apnea   • Stage 3 chronic kidney disease (CMS/HCC)   • Degenerative lumbar spinal stenosis   • Vitamin D deficiency   • Chronic idiopathic gout of multiple sites   • Other emphysema (CMS/HCC)   • Mixed hyperlipidemia   • Situational anxiety   • Gynecomastia, male   • Anxiety with depression   • Hip arthrosis   • Encounter for Medicare annual wellness exam   • Venous stasis of lower extremity       Advanced Care Planning:  ACP discussion was held with the patient during this visit. Patient has an  "advance directive (not in EMR), copy requested.    Review of Systems   Constitutional: Negative for fatigue, fever and unexpected weight change.   Respiratory: Negative for shortness of breath.    Cardiovascular: Negative for chest pain.   Musculoskeletal: Positive for arthralgias and back pain.       Compared to one year ago, the patient feels his physical health is the same.  Compared to one year ago, the patient feels his mental health is the same.    Reviewed chart for potential of high risk medication in the elderly: yes  Reviewed chart for potential of harmful drug interactions in the elderly:yes    Objective         Vitals:    08/21/20 0828   BP: 146/82   Pulse: 75   Temp: 97.8 °F (36.6 °C)   SpO2: 98%   Weight: 90.7 kg (200 lb)   Height: 167.6 cm (66\")       Body mass index is 32.28 kg/m².  Discussed the patient's BMI with him. The BMI is above average; BMI management plan is completed.    Physical Exam   Constitutional: He appears well-developed and well-nourished.   HENT:   Head: Normocephalic and atraumatic.   Eyes: Pupils are equal, round, and reactive to light. Conjunctivae are normal.   Neck: Normal range of motion. Neck supple. No thyromegaly present.   Cardiovascular: Normal rate, regular rhythm and normal heart sounds.   No murmur heard.  Pulmonary/Chest: Effort normal and breath sounds normal. He has no wheezes. He has no rales.   Abdominal: Soft. Bowel sounds are normal. He exhibits no distension. There is no hepatosplenomegaly. There is no tenderness.   Musculoskeletal: He exhibits edema.   Left lower extremity--- with chronic venous stasis changes, trace to 1+ edema, no warmth, no increased erythema   Lymphadenopathy:     He has no cervical adenopathy.   Skin:   Sees dermatologist yearly   Psychiatric: He has a normal mood and affect. His behavior is normal. Judgment and thought content normal.   Nursing note and vitals reviewed.      Lab Results   Component Value Date    GLU 94 08/21/2020    " CHLPL 176 06/02/2020    TRIG 127 06/02/2020    HDL 49 06/02/2020     (H) 06/02/2020    VLDL 25 06/02/2020      Labs--June 2020 GFR 41, , otherwise labs within normal limits    Assessment/Plan   Medicare Risks and Personalized Health Plan  CMS Preventative Services Quick Reference  Abdominal Aortic Aneurysm Screening  Advance Directive Discussion  Cardiovascular risk  Chronic Pain   Colon Cancer Screening  Dementia/Memory   Depression/Dysphoria  Diabetic Lab Screening   Fall Risk  Glaucoma Risk  Hearing Problem  Immunizations Discussed/Encouraged (specific immunizations; adacel Tdap, Influenza, Pneumococcal 23, Prevnar and Shingrix )  Inactivity/Sedentary    The above risks/problems have been discussed with the patient.  Pertinent information has been shared with the patient in the After Visit Summary.  Follow up plans and orders are seen below in the Assessment/Plan Section.    Diagnoses and all orders for this visit:    1. Encounter for Medicare annual wellness exam (Primary) --within normal limits.  All screening up-to-date except needs hepatitis C antibody screen.  Declined shingles vaccine, Tdap vaccine.  Plans for annual influenza vaccine.  Otherwise, continue to improve upon healthy well-balanced diet and regular cardio exercise greater than 150 minutes weekly  -     Hepatitis C Antibody    2. Venous stasis of lower extremity --uncontrolled.  No evidence of cellulitis.  Will increase Aldactone from 25 mg to 50 mg daily.  Watch electrolytes closely.    3. Degenerative lumbar spinal stenosis stable.  Kunal reviewed.  Urine tox screen February 2020.  No change in medication.  Will refill hydrocodone 10 mg, quantity 120, as directed below.,  Per 30 days.    4. Hip arthrosis stable  -     HYDROcodone-acetaminophen (NORCO)  MG per tablet; Take 1 tablet by mouth Every 6 (Six) Hours As Needed for Moderate Pain .  Dispense: 120 tablet; Refill: 0    5. Essential hypertension fair control.  Will  increase Aldactone from 25 mg to 50 mg daily  -     Comprehensive Metabolic Panel    6. Stage 3 chronic kidney disease (CMS/HCC) stable.  Recheck renal function today.  If any further decline may need referral to nephrologist, otherwise continue to avoid all anti-inflammatories as much as possible  -     Comprehensive Metabolic Panel    7. Mixed hyperlipidemia stable.  No change with Crestor.  Refill Crestor 20 mg 1 p.o. daily upon request.  Lipid panel up-to-date    Other orders  -     spironolactone (ALDACTONE) 50 MG tablet; Take 1 tablet by mouth Daily.  Dispense: 90 tablet; Refill: 1      Follow Up:  Return in about 3 months (around 11/21/2020) for Recheck.     An After Visit Summary and PPPS were given to the patient.

## 2020-08-22 LAB
ALBUMIN SERPL-MCNC: 4.4 G/DL (ref 3.5–5.2)
ALBUMIN/GLOB SERPL: 2 G/DL
ALP SERPL-CCNC: 101 U/L (ref 39–117)
ALT SERPL-CCNC: 16 U/L (ref 1–41)
AST SERPL-CCNC: 26 U/L (ref 1–40)
BILIRUB SERPL-MCNC: 0.5 MG/DL (ref 0–1.2)
BUN SERPL-MCNC: 25 MG/DL (ref 8–23)
BUN/CREAT SERPL: 15.2 (ref 7–25)
CALCIUM SERPL-MCNC: 9.3 MG/DL (ref 8.6–10.5)
CHLORIDE SERPL-SCNC: 100 MMOL/L (ref 98–107)
CO2 SERPL-SCNC: 31.3 MMOL/L (ref 22–29)
CREAT SERPL-MCNC: 1.64 MG/DL (ref 0.76–1.27)
GLOBULIN SER CALC-MCNC: 2.2 GM/DL
GLUCOSE SERPL-MCNC: 94 MG/DL (ref 65–99)
HCV AB S/CO SERPL IA: <0.1 S/CO RATIO (ref 0–0.9)
POTASSIUM SERPL-SCNC: 4.4 MMOL/L (ref 3.5–5.2)
PROT SERPL-MCNC: 6.6 G/DL (ref 6–8.5)
SODIUM SERPL-SCNC: 141 MMOL/L (ref 136–145)

## 2020-08-22 NOTE — PATIENT INSTRUCTIONS
Recommend low fat/low calorie diet and exercise greater than 150 minutes of cardio per week.   Continue current treatment plan.  Increase Aldactone from 25 mg to 50 mg daily

## 2020-08-27 DIAGNOSIS — N18.30 STAGE 3 CHRONIC KIDNEY DISEASE (HCC): Primary | ICD-10-CM

## 2020-09-20 DIAGNOSIS — M16.9 HIP ARTHROSIS: ICD-10-CM

## 2020-09-25 RX ORDER — HYDROCODONE BITARTRATE AND ACETAMINOPHEN 10; 325 MG/1; MG/1
1 TABLET ORAL EVERY 6 HOURS PRN
Qty: 120 TABLET | Refills: 0 | Status: SHIPPED | OUTPATIENT
Start: 2020-09-25 | End: 2020-10-30 | Stop reason: SDUPTHER

## 2020-10-23 RX ORDER — ALLOPURINOL 100 MG/1
100 TABLET ORAL DAILY
Qty: 30 TABLET | Refills: 0 | Status: SHIPPED | OUTPATIENT
Start: 2020-10-23 | End: 2020-11-27

## 2020-10-30 DIAGNOSIS — M16.9 HIP ARTHROSIS: ICD-10-CM

## 2020-11-01 RX ORDER — HYDROCODONE BITARTRATE AND ACETAMINOPHEN 10; 325 MG/1; MG/1
1 TABLET ORAL EVERY 6 HOURS PRN
Qty: 120 TABLET | Refills: 0 | Status: SHIPPED | OUTPATIENT
Start: 2020-11-01 | End: 2020-11-20 | Stop reason: SDUPTHER

## 2020-11-20 ENCOUNTER — OFFICE VISIT (OUTPATIENT)
Dept: FAMILY MEDICINE CLINIC | Facility: CLINIC | Age: 80
End: 2020-11-20

## 2020-11-20 VITALS
HEIGHT: 66 IN | BODY MASS INDEX: 32.28 KG/M2 | SYSTOLIC BLOOD PRESSURE: 142 MMHG | TEMPERATURE: 97.7 F | OXYGEN SATURATION: 98 % | HEART RATE: 77 BPM | DIASTOLIC BLOOD PRESSURE: 82 MMHG

## 2020-11-20 DIAGNOSIS — N18.31 STAGE 3A CHRONIC KIDNEY DISEASE (HCC): ICD-10-CM

## 2020-11-20 DIAGNOSIS — M48.061 DEGENERATIVE LUMBAR SPINAL STENOSIS: ICD-10-CM

## 2020-11-20 DIAGNOSIS — E78.2 MIXED HYPERLIPIDEMIA: ICD-10-CM

## 2020-11-20 DIAGNOSIS — I10 ESSENTIAL HYPERTENSION: ICD-10-CM

## 2020-11-20 DIAGNOSIS — M16.9 HIP ARTHROSIS: Primary | ICD-10-CM

## 2020-11-20 DIAGNOSIS — I87.8 VENOUS STASIS OF LOWER EXTREMITY: ICD-10-CM

## 2020-11-20 PROBLEM — Z00.00 ENCOUNTER FOR MEDICARE ANNUAL WELLNESS EXAM: Status: RESOLVED | Noted: 2020-08-21 | Resolved: 2020-11-20

## 2020-11-20 PROCEDURE — 99214 OFFICE O/P EST MOD 30 MIN: CPT | Performed by: FAMILY MEDICINE

## 2020-11-20 RX ORDER — ROSUVASTATIN CALCIUM 20 MG/1
20 TABLET, COATED ORAL DAILY
Qty: 90 TABLET | Refills: 1 | Status: SHIPPED | OUTPATIENT
Start: 2020-11-20 | End: 2021-03-15 | Stop reason: SDUPTHER

## 2020-11-20 RX ORDER — HYDROCODONE BITARTRATE AND ACETAMINOPHEN 10; 325 MG/1; MG/1
1 TABLET ORAL EVERY 6 HOURS PRN
Qty: 120 TABLET | Refills: 0 | Status: SHIPPED | OUTPATIENT
Start: 2020-12-01 | End: 2020-12-31 | Stop reason: SDUPTHER

## 2020-11-20 RX ORDER — METHYLPREDNISOLONE 4 MG/1
TABLET ORAL
Qty: 1 EACH | Refills: 0 | Status: SHIPPED | OUTPATIENT
Start: 2020-11-20 | End: 2021-02-12 | Stop reason: SDUPTHER

## 2020-11-20 NOTE — PROGRESS NOTES
Subjective   Kike Rubio is a 80 y.o. male.     Vitals:    11/20/20 1420   BP: 142/82   Pulse: 77   Temp: 97.7 °F (36.5 °C)   SpO2: 98%        Chief Complaint   Patient presents with   • Hyperlipidemia     update pain meds   • Chronic Kidney Disease   • Osteoarthritis   • Hypertension        History of Present Illness    3-month follow-up for HTN, chronic edema, OA, CKD, chronic back pain, and hyperlipidemia    LOV with me in August for wellness exam, medication refills.  A few changes made, see below  Overall, doing much better.    At last visit, he was referred to renal doc due to progressive decline in renal function.  He tells me he is seeing Dr. Chaudhry Daily and is really happy with her.  He has a renal ultrasound scheduled next week and follow-up the week after.  No changes in medications were made.    HTN--has been fairly well controlled with a combination of HCTZ 12.5 mg daily and Aldactone either 25 mg or 50 mg daily depending on lower extremity edema.  Home blood pressures generally range between 130- 140s/70 to 80s.  Denies chest pain, shortness of air.    Chronic edema--under much better control since the increase in dosage of Aldactone from 25 mg to 50 mg at last office visit.  However, he tells me he alternates between the 25 mg dose and the 50 mg dose depending on the amount of swelling.  He is tolerating the medication without side effects.  No recent infections, warmth, or redness noted.    Significant hip OA and lumbar DDD with stenosis--has generally been under good control with hydrocodone 10 mg a quantity of 4/day.  Tolerates this medication without side effects.  Urine tox screen for compliance was February 2020 and consistent with prescription history.  Kunal reviewed by me today shows Norco 10 mg a quantity of 120 last refilled 11/2/2020.  Refill needed soon.  However, he is requesting a Medrol Dosepak due to increasing arthralgias in general, especially hips over the last several weeks.   Intolerant to nonsteroidals due to history of CKD.    Hyperlipidemia--has been well controlled with Crestor 20 mg daily.  Tolerates medication without side effects.  Tries to maintain a healthy well-balanced diet, limited exercise due to significant immobility.  Last lipids checked in August 2020, .  Request refills today.    The following portions of the patient's history were reviewed and updated as appropriate: allergies, current medications, past family history, past medical history, past social history, past surgical history and problem list.    Review of Systems   Constitutional: Negative for unexpected weight gain and unexpected weight loss.   Respiratory: Negative for cough and shortness of breath.    Cardiovascular: Negative for chest pain.   I have reviewed and confirmed the accuracy of the ROS as documented by the MA/LPN/RN Kina Morin MD      Objective   Physical Exam  Vitals signs and nursing note reviewed.   Constitutional:       Appearance: Normal appearance. He is well-developed. He is obese.   HENT:      Head: Normocephalic and atraumatic.      Nose: Nose normal.   Eyes:      Conjunctiva/sclera: Conjunctivae normal.      Pupils: Pupils are equal, round, and reactive to light.   Neck:      Musculoskeletal: Normal range of motion and neck supple.      Thyroid: No thyromegaly.   Cardiovascular:      Rate and Rhythm: Normal rate and regular rhythm.      Heart sounds: Normal heart sounds. No murmur.   Pulmonary:      Effort: Pulmonary effort is normal.      Breath sounds: Normal breath sounds.   Abdominal:      General: Abdomen is flat. Bowel sounds are normal. There is no distension.      Palpations: Abdomen is soft. There is no hepatomegaly, splenomegaly or mass.      Tenderness: There is no abdominal tenderness. There is no guarding or rebound.      Hernia: No hernia is present.   Musculoskeletal: Normal range of motion.      Comments: Bilateral lower extremities--with mild chronic venous  stasis changes, trace to 1+ edema left greater than right, stable   Lymphadenopathy:      Cervical: No cervical adenopathy.   Skin:     General: Skin is warm.   Neurological:      General: No focal deficit present.      Mental Status: He is alert.   Psychiatric:         Mood and Affect: Mood normal.         Behavior: Behavior normal.         Thought Content: Thought content normal.         Judgment: Judgment normal.          LABS/STUDIES --August 2020--, potassium 4.4, GFR 41    Procedures     Assessment/Plan   Diagnoses and all orders for this visit:    1. Hip arthrosis (Primary) -uncontrolled exacerbation.  We will add Medrol Dosepak to be taken as directed for acute exacerbation.  Otherwise, no change in medication.  Kunal and urine tox screen up-to-date and appropriate.  Refill Norco 10 mg as directed below, 120 for 30 days, next refill due 12/1/2020.  -     HYDROcodone-acetaminophen (NORCO)  MG per tablet; Take 1 tablet by mouth Every 6 (Six) Hours As Needed for Moderate Pain .  Dispense: 120 tablet; Refill: 0    2. Degenerative lumbar spinal stenosis --stable.  Urine tox screen in February 2020, appropriate.  Kunal reviewed and appropriate.  No change of medication.  Continue/refill Norco 10 mg as directed above, 120 per 30 days, next refill due 12/1/2020.    3. Stage 3a chronic kidney disease -stable.  Continue to avoid all NSAIDs.  Further management per renal doc/ Daily    4. Mixed hyperlipidemia --stable.  Lipids up-to-date.  No change in medication.  Continue/refill today Crestor 20 mg 1 p.o. daily.  Otherwise, continue to improve upon a healthy well-balanced diet that is low in cholesterol and try to increase exercise cardio exercise to greater than 150 minutes weekly    5. Venous stasis of lower extremity --chronic venous stasis--stable.  No change with Aldactone.    6. Essential hypertension --stable.  No change of medication.  Continue both HCTZ 12.5 mg daily and Aldactone as  currently prescribed, will refill both upon request.    Other orders  -     rosuvastatin (CRESTOR) 20 MG tablet; Take 1 tablet by mouth Daily.  Dispense: 90 tablet; Refill: 1  -     methylPREDNISolone (MEDROL) 4 MG dose pack; Take as directed on package instructions.  Dispense: 1 each; Refill: 0                 Wore goggles and face mask during entire visit.    Return in about 3 months (around 2/20/2021) for Recheck.

## 2020-11-20 NOTE — PATIENT INSTRUCTIONS
Recommend low fat/low calorie diet and exercise greater than 150 minutes of cardio per week.   Continue current treatment plan.  Take Medrol Dosepak as directed

## 2020-11-27 RX ORDER — ALLOPURINOL 100 MG/1
TABLET ORAL
Qty: 30 TABLET | Refills: 2 | Status: SHIPPED | OUTPATIENT
Start: 2020-11-27 | End: 2021-01-25 | Stop reason: SDUPTHER

## 2020-12-22 RX ORDER — OMEPRAZOLE 20 MG/1
20 CAPSULE, DELAYED RELEASE ORAL DAILY
Qty: 90 CAPSULE | Refills: 0 | Status: SHIPPED | OUTPATIENT
Start: 2020-12-22 | End: 2021-03-15 | Stop reason: SDUPTHER

## 2020-12-31 DIAGNOSIS — M16.9 HIP ARTHROSIS: ICD-10-CM

## 2021-01-03 RX ORDER — HYDROCODONE BITARTRATE AND ACETAMINOPHEN 10; 325 MG/1; MG/1
1 TABLET ORAL EVERY 6 HOURS PRN
Qty: 120 TABLET | Refills: 0 | Status: SHIPPED | OUTPATIENT
Start: 2021-01-03 | End: 2021-01-31 | Stop reason: SDUPTHER

## 2021-01-27 RX ORDER — HYDROCHLOROTHIAZIDE 12.5 MG/1
12.5 TABLET ORAL DAILY
Qty: 90 TABLET | Refills: 0 | Status: SHIPPED | OUTPATIENT
Start: 2021-01-27 | End: 2021-03-28

## 2021-01-27 RX ORDER — ALLOPURINOL 100 MG/1
100 TABLET ORAL DAILY
Qty: 90 TABLET | Refills: 0 | Status: SHIPPED | OUTPATIENT
Start: 2021-01-27 | End: 2021-03-28

## 2021-01-31 DIAGNOSIS — M16.9 HIP ARTHROSIS: ICD-10-CM

## 2021-02-02 RX ORDER — HYDROCODONE BITARTRATE AND ACETAMINOPHEN 10; 325 MG/1; MG/1
1 TABLET ORAL EVERY 6 HOURS PRN
Qty: 120 TABLET | Refills: 0 | Status: SHIPPED | OUTPATIENT
Start: 2021-02-02 | End: 2021-02-12 | Stop reason: SDUPTHER

## 2021-02-03 ENCOUNTER — TELEPHONE (OUTPATIENT)
Dept: FAMILY MEDICINE CLINIC | Facility: CLINIC | Age: 81
End: 2021-02-03

## 2021-02-03 NOTE — TELEPHONE ENCOUNTER
PATIENT IS WANTING TO CONFIRM OFFICE RECEIVED COPIES OF HIS INSURANCE CARD, STATES HE SPOKE TO [employer] ABOUT SENDING IN PICTURES, IN REGARDS TO WANTING TO SWITCH ALL PRESCRIPTIONS TO [employer] MAIL DELIVERY. PLEASE ADVISE IF NOT RECEIVED      PATIENT CALL BACK:[phone]

## 2021-02-04 NOTE — TELEPHONE ENCOUNTER
Spoke with patient everything is set up with mail order and we will complete when they come in for check up soon

## 2021-02-12 ENCOUNTER — OFFICE VISIT (OUTPATIENT)
Dept: FAMILY MEDICINE CLINIC | Facility: CLINIC | Age: 81
End: 2021-02-12

## 2021-02-12 VITALS
DIASTOLIC BLOOD PRESSURE: 62 MMHG | HEIGHT: 66 IN | TEMPERATURE: 97.5 F | HEART RATE: 84 BPM | BODY MASS INDEX: 32.28 KG/M2 | OXYGEN SATURATION: 96 % | SYSTOLIC BLOOD PRESSURE: 132 MMHG

## 2021-02-12 DIAGNOSIS — I10 ESSENTIAL HYPERTENSION: ICD-10-CM

## 2021-02-12 DIAGNOSIS — E78.2 MIXED HYPERLIPIDEMIA: ICD-10-CM

## 2021-02-12 DIAGNOSIS — M48.061 DEGENERATIVE LUMBAR SPINAL STENOSIS: Primary | ICD-10-CM

## 2021-02-12 DIAGNOSIS — Z79.899 HIGH RISK MEDICATION USE: ICD-10-CM

## 2021-02-12 DIAGNOSIS — F41.8 ANXIETY WITH DEPRESSION: ICD-10-CM

## 2021-02-12 DIAGNOSIS — N18.31 STAGE 3A CHRONIC KIDNEY DISEASE (HCC): ICD-10-CM

## 2021-02-12 DIAGNOSIS — M16.9 HIP ARTHROSIS: ICD-10-CM

## 2021-02-12 DIAGNOSIS — I87.8 VENOUS STASIS OF LOWER EXTREMITY: ICD-10-CM

## 2021-02-12 PROCEDURE — 99214 OFFICE O/P EST MOD 30 MIN: CPT | Performed by: FAMILY MEDICINE

## 2021-02-12 RX ORDER — ROSUVASTATIN CALCIUM 20 MG/1
20 TABLET, COATED ORAL DAILY
Qty: 90 TABLET | Refills: 1 | Status: CANCELLED | OUTPATIENT
Start: 2021-02-12

## 2021-02-12 RX ORDER — HYDROCODONE BITARTRATE AND ACETAMINOPHEN 10; 325 MG/1; MG/1
1 TABLET ORAL EVERY 6 HOURS PRN
Qty: 120 TABLET | Refills: 0 | Status: SHIPPED | OUTPATIENT
Start: 2021-03-02 | End: 2021-04-06 | Stop reason: SDUPTHER

## 2021-02-12 RX ORDER — METHYLPREDNISOLONE 4 MG/1
TABLET ORAL
Qty: 1 EACH | Refills: 0 | Status: SHIPPED | OUTPATIENT
Start: 2021-02-12 | End: 2021-05-17 | Stop reason: SDUPTHER

## 2021-02-12 NOTE — PROGRESS NOTES
Subjective   Kike Rubio is a 80 y.o. male.     Vitals:    02/12/21 1104   BP: 132/62   Pulse: 84   Temp: 97.5 °F (36.4 °C)   SpO2: 96%        Chief Complaint   Patient presents with   • Hyperlipidemia     med refill   • Chronic Kidney Disease   • Back Pain   • Osteoarthritis   • Hypertension        History of Present Illness    3-month follow-up for lumbar DDD with stenosis and significant hip arthrosis  6-month follow-up on HTN, hyperlipidemia, chronic venous stasis, and FRAN    LOV with me in November for med refills and an exacerbation of osteoarthritis  No changes made at that visit; however, he was given a Medrol Dosepak for general osteoarthritis exacerbation (especially since he cannot take NSAIDs due to history of CKD)  This really seems to help for short time frame.  Maintains regular follow-up with nephrologist, last visit in December, GFR stable.    Overall, doing okay all considering the pandemic.  No complaints today, just needs refills and due for labs?  However, would like to discuss the possibility of being placed on long-term prednisone?  He suffers from significant hip OA and lumbar DDD with stenosis and is intolerant to NSAIDs due to history of CKD.  The prednisone/Medrol Dosepaks really do seem to help and would like to consider long-term prednisone treatment?    Otherwise, compliant with and tolerates all medications without side effects  States his lower extremity edema has been under great control with the Aldactone.  Generally takes Aldactone 25 mg daily however occasionally may need 50 mg  Still takes Norco 10 mg a quantity of 4/day for significant hip OA and lumbar DDD with stenosis.  Last urine tox ring for compliance was February 2020 and consistent with prescription history.  Very low risk patient behavior.  Kunal reviewed by me today shows Norco quantity 120 last filled to 2/3/2021    The following portions of the patient's history were reviewed and updated as appropriate: allergies,  current medications, past family history, past medical history, past social history, past surgical history and problem list.    Review of Systems   Constitutional: Negative for fever, unexpected weight gain and unexpected weight loss.   Respiratory: Negative for cough and shortness of breath.    Cardiovascular: Negative for chest pain.       Objective   Physical Exam  Vitals signs and nursing note reviewed.   Constitutional:       Appearance: Normal appearance. He is well-developed and normal weight.   HENT:      Head: Normocephalic and atraumatic.      Nose: Nose normal.   Eyes:      Conjunctiva/sclera: Conjunctivae normal.      Pupils: Pupils are equal, round, and reactive to light.   Neck:      Musculoskeletal: Normal range of motion and neck supple.      Thyroid: No thyromegaly.   Cardiovascular:      Rate and Rhythm: Normal rate and regular rhythm.      Heart sounds: Normal heart sounds. No murmur.   Pulmonary:      Effort: Pulmonary effort is normal.      Breath sounds: Normal breath sounds.   Abdominal:      General: Abdomen is flat. Bowel sounds are normal. There is no distension.      Palpations: Abdomen is soft. There is no hepatomegaly, splenomegaly or mass.      Tenderness: There is no abdominal tenderness. There is no guarding or rebound.      Hernia: No hernia is present.   Musculoskeletal: Normal range of motion.      Right lower leg: No edema.      Left lower leg: No edema.      Comments: Bilateral lower extremities--chronic venous stasis changes, trace to 1+ edema, overall looks very good   Lymphadenopathy:      Cervical: No cervical adenopathy.   Skin:     General: Skin is warm.   Neurological:      General: No focal deficit present.      Mental Status: He is alert.   Psychiatric:         Mood and Affect: Mood normal.         Behavior: Behavior normal.         Thought Content: Thought content normal.         Judgment: Judgment normal.          LABS/STUDIES -December 2020 --GFR 45, K 3.6                                 June 2020 --, GFR 41                                February 2020 --urine tox screen reviewed, appropriate, TSH normal    Procedures     Assessment/Plan   Diagnoses and all orders for this visit:    1. Degenerative lumbar spinal stenosis (Primary) --stable.  Update urine tox screen for compliance today, very low risk patient behavior.  Kunal report reviewed, appropriate.  No change with medication.  Continue Norco 10 mg as directed below, quantity 120 per 30 days, next refill due 3/2/21    2. Hip arthrosis --uncontrolled.  Discussed with patient benefits vs risks of long-term chronic prednisone treatment.  At this time, we will continue to avoid chronic daily prednisone; however, will prescribe Medrol Dosepak approximately every 2 to 3 months.  May start Medrol Dosepak as directed below  Same plan with Norco 10 mg as above, may refill as directed below, quantity 120 per 30 days, next refill due 3/2/21  -     HYDROcodone-acetaminophen (NORCO)  MG per tablet; Take 1 tablet by mouth Every 6 (Six) Hours As Needed for Moderate Pain .  Dispense: 120 tablet; Refill: 0    3. Mixed hyperlipidemia --stable.  Due for recheck of LFTs and lipids today  Goal LDL needs remain less than 130 given history of HTN  If at goal the no change with Crestor, will refill upon request  Otherwise, continue with low-cholesterol/low carb diet and regular exercise  -     Hepatic Function Panel  -     Lipid Panel With LDL / HDL Ratio    4. Essential hypertension --stable. No change of medication  Continue HCTZ and Aldactone as prescribed, will refill upon request  Electrolytes within normal limits, BMP checked in December    5. Venous stasis of lower extremity --well-controlled. No change of medication  Continue Aldactone as prescribed, will refill upon request. BMP up-to-date    6. Anxiety with depression stable. No change with Cymbalta, will refill upon request    7. Stage 3a chronic kidney disease (CMS/HCC)  stable.  Continue to avoid all NSAIDs  Continue regular follow-up with nephrologist    8. High risk medication use  -     ToxASSURE Select 13 Discrete -    Other orders  -     Cancel: rosuvastatin (CRESTOR) 20 MG tablet; Take 1 tablet by mouth Daily.  Dispense: 90 tablet; Refill: 1  -     methylPREDNISolone (MEDROL) 4 MG dose pack; Take as directed on package instructions.  Dispense: 1 each; Refill: 0                 Wore goggles and face mask during entire visit.    Return in about 3 months (around 5/12/2021).

## 2021-02-18 LAB
6MAM UR QL CFM: NEGATIVE
6MAM/CREAT UR: NOT DETECTED NG/MG CREAT
7AMINOCLONAZEPAM/CREAT UR: NOT DETECTED NG/MG CREAT
A-OH ALPRAZ/CREAT UR: NOT DETECTED NG/MG CREAT
A-OH-TRIAZOLAM/CREAT UR CFM: NOT DETECTED NG/MG CREAT
ALBUMIN SERPL-MCNC: 4.2 G/DL (ref 3.5–5.2)
ALFENTANIL/CREAT UR CFM: NOT DETECTED NG/MG CREAT
ALP SERPL-CCNC: 109 U/L (ref 39–117)
ALPHA-HYDROXYMIDAZOLAM, URINE: NOT DETECTED NG/MG CREAT
ALPRAZ/CREAT UR CFM: NOT DETECTED NG/MG CREAT
ALT SERPL-CCNC: 14 U/L (ref 1–41)
AMOBARBITAL UR QL CFM: NOT DETECTED
AMPHET/CREAT UR: NOT DETECTED NG/MG CREAT
AMPHETAMINES UR QL CFM: NEGATIVE
AST SERPL-CCNC: 25 U/L (ref 1–40)
BARBITAL UR QL CFM: NOT DETECTED
BARBITURATES UR QL CFM: NEGATIVE
BENZODIAZ UR QL CFM: NEGATIVE
BILIRUB DIRECT SERPL-MCNC: 0.2 MG/DL (ref 0–0.3)
BILIRUB SERPL-MCNC: 0.4 MG/DL (ref 0–1.2)
BUPRENORPHINE UR QL CFM: NEGATIVE
BUPRENORPHINE/CREAT UR: NOT DETECTED NG/MG CREAT
BUTABARBITAL UR QL CFM: NOT DETECTED
BUTALBITAL UR QL CFM: NOT DETECTED
BZE/CREAT UR: NOT DETECTED NG/MG CREAT
CANNABINOIDS UR QL CFM: NEGATIVE
CARBOXYTHC/CREAT UR: NOT DETECTED NG/MG CREAT
CHOLEST SERPL-MCNC: 145 MG/DL (ref 0–200)
CLONAZEPAM/CREAT UR CFM: NOT DETECTED NG/MG CREAT
COCAETHYLENE/CREAT UR CFM: NOT DETECTED NG/MG CREAT
COCAINE UR QL CFM: NEGATIVE
COCAINE/CREAT UR CFM: NOT DETECTED NG/MG CREAT
CODEINE/CREAT UR: NOT DETECTED NG/MG CREAT
CREAT UR-MCNC: 47 MG/DL
DESALKYLFLURAZ/CREAT UR: NOT DETECTED NG/MG CREAT
DESMETHYLFLUNITRAZEPAM: NOT DETECTED NG/MG CREAT
DHC/CREAT UR: 447 NG/MG CREAT
DIAZEPAM/CREAT UR: NOT DETECTED NG/MG CREAT
DRUGS UR: NORMAL
EDDP/CREAT UR: NOT DETECTED NG/MG CREAT
ETHANOL UR CFM-MCNC: NOT DETECTED G/DL
ETHANOL UR QL CFM: NEGATIVE
FENTANYL UR QL CFM: NEGATIVE
FENTANYL/CREAT UR: NOT DETECTED NG/MG CREAT
FLUNITRAZEPAM UR QL CFM: NOT DETECTED NG/MG CREAT
HDLC SERPL-MCNC: 52 MG/DL (ref 40–60)
HYDROCODONE/CREAT UR: 817 NG/MG CREAT
HYDROMORPHONE/CREAT UR: 562 NG/MG CREAT
LDLC SERPL CALC-MCNC: 71 MG/DL (ref 0–100)
LDLC/HDLC SERPL: 1.32 {RATIO}
LEVEL OF DETECTION:: NORMAL
LORAZEPAM/CREAT UR: NOT DETECTED NG/MG CREAT
MDA/CREAT UR: NOT DETECTED NG/MG CREAT
MDMA/CREAT UR: NOT DETECTED NG/MG CREAT
MEPHOBARBITAL UR QL CFM: NOT DETECTED
METHADONE UR QL CFM: NEGATIVE
METHADONE/CREAT UR: NOT DETECTED NG/MG CREAT
METHAMPHET/CREAT UR: NOT DETECTED NG/MG CREAT
MIDAZOLAM/CREAT UR CFM: NOT DETECTED NG/MG CREAT
MORPHINE/CREAT UR: NOT DETECTED NG/MG CREAT
N-NORTRAMADOL/CREAT UR CFM: NOT DETECTED NG/MG CREAT
NARCOTICS UR: NEGATIVE
NORBUPRENORPHINE/CREAT UR: NOT DETECTED NG/MG CREAT
NORCODEINE/CREAT UR CFM: NOT DETECTED NG/MG CREAT
NORDIAZEPAM/CREAT UR: NOT DETECTED NG/MG CREAT
NORFENTANYL/CREAT UR: NOT DETECTED NG/MG CREAT
NORHYDROCODONE/CREAT UR: 1553 NG/MG CREAT
NORMORPHINE UR-MCNC: NOT DETECTED NG/MG CREAT
NOROXYCODONE/CREAT UR: NOT DETECTED NG/MG CREAT
NOROXYMORPHONE/CREAT UR CFM: NOT DETECTED NG/MG CREAT
O-NORTRAMADOL UR CFM-MCNC: NOT DETECTED NG/MG CREAT
OPIATES UR QL CFM: NORMAL
OXAZEPAM/CREAT UR: NOT DETECTED NG/MG CREAT
OXYCODONE UR QL CFM: NEGATIVE
OXYCODONE/CREAT UR: NOT DETECTED NG/MG CREAT
OXYMORPHONE/CREAT UR: NOT DETECTED NG/MG CREAT
PENTOBARB UR QL CFM: NOT DETECTED
PHENOBARB UR QL CFM: NOT DETECTED
PROT SERPL-MCNC: 6.6 G/DL (ref 6–8.5)
SECOBARBITAL UR QL CFM: NOT DETECTED
SUFENTANIL/CREAT UR CFM: NOT DETECTED NG/MG CREAT
TAPENTADOL UR QL CFM: NEGATIVE
TAPENTADOL/CREAT UR: NOT DETECTED NG/MG CREAT
TEMAZEPAM/CREAT UR: NOT DETECTED NG/MG CREAT
THIOPENTAL UR QL CFM: NOT DETECTED
TRAMADOL UR QL CFM: NOT DETECTED NG/MG CREAT
TRIGL SERPL-MCNC: 122 MG/DL (ref 0–150)
VLDLC SERPL CALC-MCNC: 22 MG/DL (ref 5–40)

## 2021-02-25 RX ORDER — DULOXETIN HYDROCHLORIDE 30 MG/1
30 CAPSULE, DELAYED RELEASE ORAL DAILY
Qty: 90 CAPSULE | Refills: 0 | Status: SHIPPED | OUTPATIENT
Start: 2021-02-25 | End: 2021-03-15 | Stop reason: SDUPTHER

## 2021-03-09 DIAGNOSIS — Z23 IMMUNIZATION DUE: ICD-10-CM

## 2021-03-18 RX ORDER — SPIRONOLACTONE 50 MG/1
50 TABLET, FILM COATED ORAL DAILY
Qty: 90 TABLET | Refills: 1 | Status: SHIPPED | OUTPATIENT
Start: 2021-03-18 | End: 2021-05-17 | Stop reason: SDUPTHER

## 2021-03-18 RX ORDER — DULOXETIN HYDROCHLORIDE 30 MG/1
30 CAPSULE, DELAYED RELEASE ORAL DAILY
Qty: 90 CAPSULE | Refills: 1 | Status: SHIPPED | OUTPATIENT
Start: 2021-03-18 | End: 2021-05-17 | Stop reason: SDUPTHER

## 2021-03-18 RX ORDER — ROSUVASTATIN CALCIUM 20 MG/1
20 TABLET, COATED ORAL DAILY
Qty: 90 TABLET | Refills: 1 | Status: SHIPPED | OUTPATIENT
Start: 2021-03-18 | End: 2021-05-17 | Stop reason: SDUPTHER

## 2021-03-18 RX ORDER — OMEPRAZOLE 20 MG/1
20 CAPSULE, DELAYED RELEASE ORAL DAILY
Qty: 90 CAPSULE | Refills: 1 | Status: SHIPPED | OUTPATIENT
Start: 2021-03-18 | End: 2021-05-17 | Stop reason: SDUPTHER

## 2021-03-28 RX ORDER — HYDROCHLOROTHIAZIDE 12.5 MG/1
TABLET ORAL
Qty: 90 TABLET | Refills: 0 | Status: SHIPPED | OUTPATIENT
Start: 2021-03-28 | End: 2021-04-09

## 2021-03-28 RX ORDER — ALLOPURINOL 100 MG/1
TABLET ORAL
Qty: 90 TABLET | Refills: 0 | Status: SHIPPED | OUTPATIENT
Start: 2021-03-28 | End: 2021-04-09

## 2021-04-06 DIAGNOSIS — M16.9 HIP ARTHROSIS: ICD-10-CM

## 2021-04-08 RX ORDER — HYDROCODONE BITARTRATE AND ACETAMINOPHEN 10; 325 MG/1; MG/1
1 TABLET ORAL EVERY 6 HOURS PRN
Qty: 120 TABLET | Refills: 0 | Status: SHIPPED | OUTPATIENT
Start: 2021-04-08 | End: 2021-05-08 | Stop reason: SDUPTHER

## 2021-04-09 RX ORDER — ALLOPURINOL 100 MG/1
TABLET ORAL
Qty: 90 TABLET | Refills: 0 | Status: SHIPPED | OUTPATIENT
Start: 2021-04-09 | End: 2021-08-05

## 2021-04-09 RX ORDER — HYDROCHLOROTHIAZIDE 12.5 MG/1
TABLET ORAL
Qty: 90 TABLET | Refills: 0 | Status: SHIPPED | OUTPATIENT
Start: 2021-04-09 | End: 2022-01-25 | Stop reason: SDUPTHER

## 2021-05-08 DIAGNOSIS — M16.9 HIP ARTHROSIS: ICD-10-CM

## 2021-05-10 RX ORDER — HYDROCODONE BITARTRATE AND ACETAMINOPHEN 10; 325 MG/1; MG/1
1 TABLET ORAL EVERY 6 HOURS PRN
Qty: 120 TABLET | Refills: 0 | Status: SHIPPED | OUTPATIENT
Start: 2021-05-10 | End: 2021-05-17 | Stop reason: SDUPTHER

## 2021-05-17 DIAGNOSIS — M16.9 HIP ARTHROSIS: ICD-10-CM

## 2021-05-17 NOTE — TELEPHONE ENCOUNTER
Caller: TAZ LIZETTE    Relationship: Other Relative    Best call back number: [phone]    Medication needed:   Requested Prescriptions     Pending Prescriptions Disp Refills   • omeprazole (priLOSEC) 20 MG capsule 90 capsule 1     Sig: Take 1 capsule by mouth Daily.   • spironolactone (ALDACTONE) 50 MG tablet 90 tablet 1     Sig: Take 1 tablet by mouth Daily.   • DULoxetine (CYMBALTA) 30 MG capsule 90 capsule 1     Sig: Take 1 capsule by mouth Daily.   • rosuvastatin (CRESTOR) 20 MG tablet 90 tablet 1     Sig: Take 1 tablet by mouth Daily.   • methylPREDNISolone (MEDROL) 4 MG dose pack 1 each 0     Sig: Take as directed on package instructions.   • HYDROcodone-acetaminophen (NORCO)  MG per tablet 120 tablet 0     Sig: Take 1 tablet by mouth Every 6 (Six) Hours As Needed for Moderate Pain .       When do you need the refill by: 05/17/21    What additional details did the patient provide when requesting the medication: UPCOMING APPOINTMENT ON 08/31/21 WITH WIFE TAZ BAUER    Does the patient have less than a 3 day supply:  [] Yes  [x] No    What is the patient's preferred pharmacy: [facility] - [facility] [address] - [phone]  - [phone] FX

## 2021-05-20 RX ORDER — ROSUVASTATIN CALCIUM 20 MG/1
20 TABLET, COATED ORAL DAILY
Qty: 90 TABLET | Refills: 1 | Status: SHIPPED | OUTPATIENT
Start: 2021-05-20 | End: 2022-01-25 | Stop reason: SDUPTHER

## 2021-05-20 RX ORDER — HYDROCODONE BITARTRATE AND ACETAMINOPHEN 10; 325 MG/1; MG/1
1 TABLET ORAL EVERY 6 HOURS PRN
Qty: 120 TABLET | Refills: 0 | Status: SHIPPED | OUTPATIENT
Start: 2021-05-20 | End: 2021-06-09 | Stop reason: SDUPTHER

## 2021-05-20 RX ORDER — DULOXETIN HYDROCHLORIDE 30 MG/1
30 CAPSULE, DELAYED RELEASE ORAL DAILY
Qty: 90 CAPSULE | Refills: 1 | Status: SHIPPED | OUTPATIENT
Start: 2021-05-20 | End: 2021-12-21

## 2021-05-20 RX ORDER — OMEPRAZOLE 20 MG/1
20 CAPSULE, DELAYED RELEASE ORAL DAILY
Qty: 90 CAPSULE | Refills: 1 | Status: SHIPPED | OUTPATIENT
Start: 2021-05-20 | End: 2022-01-25 | Stop reason: SDUPTHER

## 2021-05-20 RX ORDER — SPIRONOLACTONE 50 MG/1
50 TABLET, FILM COATED ORAL DAILY
Qty: 90 TABLET | Refills: 1 | Status: SHIPPED | OUTPATIENT
Start: 2021-05-20 | End: 2022-01-25 | Stop reason: SDUPTHER

## 2021-05-20 RX ORDER — METHYLPREDNISOLONE 4 MG/1
TABLET ORAL
Qty: 1 EACH | Refills: 0 | Status: SHIPPED | OUTPATIENT
Start: 2021-05-20 | End: 2022-10-18

## 2021-06-09 ENCOUNTER — OFFICE VISIT (OUTPATIENT)
Dept: FAMILY MEDICINE CLINIC | Facility: CLINIC | Age: 81
End: 2021-06-09

## 2021-06-09 VITALS
DIASTOLIC BLOOD PRESSURE: 68 MMHG | HEART RATE: 79 BPM | SYSTOLIC BLOOD PRESSURE: 132 MMHG | OXYGEN SATURATION: 98 % | WEIGHT: 195 LBS | HEIGHT: 66 IN | TEMPERATURE: 97.6 F | BODY MASS INDEX: 31.34 KG/M2

## 2021-06-09 DIAGNOSIS — I10 ESSENTIAL HYPERTENSION: ICD-10-CM

## 2021-06-09 DIAGNOSIS — M16.9 HIP ARTHROSIS: ICD-10-CM

## 2021-06-09 DIAGNOSIS — S61.011S LACERATION OF RIGHT THUMB WITHOUT FOREIGN BODY WITHOUT DAMAGE TO NAIL, SEQUELA: ICD-10-CM

## 2021-06-09 DIAGNOSIS — M48.061 DEGENERATIVE LUMBAR SPINAL STENOSIS: Primary | ICD-10-CM

## 2021-06-09 DIAGNOSIS — N18.31 STAGE 3A CHRONIC KIDNEY DISEASE (HCC): ICD-10-CM

## 2021-06-09 PROBLEM — S61.011A LACERATION OF RIGHT THUMB WITHOUT FOREIGN BODY WITHOUT DAMAGE TO NAIL: Status: ACTIVE | Noted: 2021-06-09

## 2021-06-09 PROCEDURE — 99214 OFFICE O/P EST MOD 30 MIN: CPT | Performed by: FAMILY MEDICINE

## 2021-06-09 RX ORDER — HYDROCODONE BITARTRATE AND ACETAMINOPHEN 10; 325 MG/1; MG/1
1 TABLET ORAL EVERY 6 HOURS PRN
Qty: 120 TABLET | Refills: 0 | Status: SHIPPED | OUTPATIENT
Start: 2021-07-03 | End: 2021-07-06 | Stop reason: SDUPTHER

## 2021-06-09 RX ORDER — METHYLPREDNISOLONE 4 MG/1
TABLET ORAL
Qty: 1 EACH | Refills: 0 | Status: SHIPPED | OUTPATIENT
Start: 2021-06-09 | End: 2022-10-18

## 2021-06-09 NOTE — PROGRESS NOTES
Subjective   Kike Rubio is a 80 y.o. male.     Vitals:    06/09/21 0807   BP: 132/68   Pulse: 79   Temp: 97.6 °F (36.4 °C)   SpO2: 98%        Chief Complaint   Patient presents with   • Back Pain     lumbar back pain follow up    • Hypertension     no complains doning well cno complains    • Osteoarthritis     ER follow-up in April for thumb laceration        History of Present Illness    3-month follow-up on chronic lumbar spinal stenosis, hip arthrosis, HTN, and recent ER visit for right thumb laceration    LOV with me in February for routine med refills and labs.  No changes made at that visit as all labs were stable.  His May appointment had to be rescheduled at last minute due to transportation issues with [facility], he is being worked into today's schedule.    Overall, has been doing okay.  However, unfortunately had an accident/injury with a chainsaw to his right thumb in April 2021.  He initially went to [place] [facility] and then transferred to [facility] hand surgeons/Dr. Carrasquillo for right thumb laceration repair.  Labs were done at that visit, stable.  Td shot was not updated due to just receiving his Covid vaccine 2 weeks from injury.  Also, he is pretty sure he has had fairly recent Td shots due to other injuries.  And, this chainsaw was clean.    No complaints today.  Except would like to request a Medrol Dosepak for upcoming trip to [place] in early September (his son is getting .)  Last MDP was given at last visit in February.  Intolerant to NSAIDs due to history of CKD.  Last follow-up with renal doc/Dr. Aguayo was in December 2020 --review of records shows no changes to medications, recommendations were just to keep medications dosing range of GFR 40-50.  No follow-up was requested.    Compliant with and tolerates all medications without side effects.  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  Last Urine Tox screen for compliance was at last visit in February 2021 and consistent with prescription history.  Kunal report reviewed by me today shows last refill for Norco quantity 120 on 6/3/2021 --somehow this went through [facility] mail order service, and came a week early??  Kike is unsure how this happened as well??  Very low risk patient behavior.    The following portions of the patient's history were reviewed and updated as appropriate: allergies, current medications, past family history, past medical history, past social history, past surgical history and problem list.    Review of Systems   Constitutional: Negative for unexpected weight gain and unexpected weight loss.   Respiratory: Negative for shortness of breath.    Cardiovascular: Negative for chest pain.       Objective   Physical Exam  Vitals and nursing note reviewed.   Constitutional:       Appearance: Normal appearance. He is well-developed. He is obese.   HENT:      Head: Normocephalic and atraumatic.      Nose: Nose normal.   Eyes:      Conjunctiva/sclera: Conjunctivae normal.      Pupils: Pupils are equal, round, and reactive to light.   Neck:      Thyroid: No thyromegaly.   Cardiovascular:      Rate and Rhythm: Normal rate and regular rhythm.      Heart sounds: Normal heart sounds. No murmur heard.     Pulmonary:      Effort: Pulmonary effort is normal.      Breath sounds: Normal breath sounds.   Abdominal:      General: Abdomen is flat. Bowel sounds are normal. There is no distension.      Palpations: Abdomen is soft. There is no hepatomegaly, splenomegaly or mass.      Tenderness: There is no abdominal tenderness. There is no guarding or rebound.      Hernia: No hernia is present.   Musculoskeletal:         General: Normal range of motion.      Cervical back: Normal range of motion and neck supple.      Right lower leg: No edema.      Left lower leg: No edema.      Comments: Right thumb --well-healed laceration, good range  of motion and strength   Lymphadenopathy:      Cervical: No cervical adenopathy.   Skin:     General: Skin is warm.   Neurological:      General: No focal deficit present.      Mental Status: He is alert.   Psychiatric:         Mood and Affect: Mood normal.         Behavior: Behavior normal.         Thought Content: Thought content normal.         Judgment: Judgment normal.          LABS/STUDIES  -February 2021 --- LDL 71, HDL 52, normal LFTs  April 2021 --GFR 42 (previously 41 in December), CBC WNL.    Procedures     Assessment/Plan   Diagnoses and all orders for this visit:    1. Degenerative lumbar spinal stenosis (Primary)  -stable.  Urine Tox screen/February and Kunal both reviewed, appropriate.  No change with medication.  Refill Norco 10 mg as directed below, quantity 120 per 30 days, next refill due 7/3/2021 through local pharmacy.    2. Hip arthrosis  -stable.  Same plan as above.  Will prescribe Medrol Dosepak to be taken as directed, patient may save this and use this prescription prior to his upcoming trip in September.  -     HYDROcodone-acetaminophen (NORCO)  MG per tablet; Take 1 tablet by mouth Every 6 (Six) Hours As Needed for Moderate Pain .  Dispense: 120 tablet; Refill: 0    3. Essential hypertension  -well-controlled.  No change to medication.  Continue HCTZ and Aldactone as prescribed, will refill upon request.  BMP up-to-date.    4. Stage 3a chronic kidney disease (CMS/HCC)  -stable.  No change with medications.  BMP up-to-date, stable.  We will continue to monitor closely, no need for follow-up with nephrologist at this time.    5. Laceration of right thumb without foreign body without damage to nail, sequela  - S/P ER visit in April, S/P laceration repair, healing well.    Other orders  -     methylPREDNISolone (MEDROL) 4 MG dose pack; Take as directed on package instructions.  Dispense: 1 each; Refill: 0                 Wore goggles and face mask during entire visit.    Return in  about 3 months (around 9/9/2021) for Annual physical, Medicare Wellness, about mid Sept okay.

## 2021-07-06 DIAGNOSIS — M16.9 HIP ARTHROSIS: ICD-10-CM

## 2021-07-08 RX ORDER — HYDROCODONE BITARTRATE AND ACETAMINOPHEN 10; 325 MG/1; MG/1
1 TABLET ORAL EVERY 6 HOURS PRN
Qty: 120 TABLET | Refills: 0 | Status: SHIPPED | OUTPATIENT
Start: 2021-07-08 | End: 2021-08-09 | Stop reason: SDUPTHER

## 2021-08-05 RX ORDER — ALLOPURINOL 100 MG/1
TABLET ORAL
Qty: 90 TABLET | Refills: 0 | Status: SHIPPED | OUTPATIENT
Start: 2021-08-05 | End: 2022-01-25 | Stop reason: SDUPTHER

## 2021-08-09 DIAGNOSIS — M16.9 HIP ARTHROSIS: ICD-10-CM

## 2021-08-10 NOTE — TELEPHONE ENCOUNTER
Rx Refill Note  Requested Prescriptions     Pending Prescriptions Disp Refills   • HYDROcodone-acetaminophen (NORCO)  MG per tablet 120 tablet 0     Sig: Take 1 tablet by mouth Every 6 (Six) Hours As Needed for Moderate Pain .      Last office visit with prescribing clinician: 6/9/2021      Next office visit with prescribing clinician: 9/15/2021            Torie Diaz MA/LMR  08/10/21, 08:06 EDT

## 2021-08-11 RX ORDER — HYDROCODONE BITARTRATE AND ACETAMINOPHEN 10; 325 MG/1; MG/1
1 TABLET ORAL EVERY 6 HOURS PRN
Qty: 120 TABLET | Refills: 0 | Status: SHIPPED | OUTPATIENT
Start: 2021-08-11 | End: 2021-09-15 | Stop reason: SDUPTHER

## 2021-08-23 ENCOUNTER — TELEPHONE (OUTPATIENT)
Dept: FAMILY MEDICINE CLINIC | Facility: CLINIC | Age: 81
End: 2021-08-23

## 2021-08-23 RX ORDER — CEPHALEXIN 500 MG/1
500 CAPSULE ORAL 3 TIMES DAILY
Qty: 21 CAPSULE | Refills: 0 | Status: SHIPPED | OUTPATIENT
Start: 2021-08-23 | End: 2021-08-25 | Stop reason: SDUPTHER

## 2021-08-23 NOTE — TELEPHONE ENCOUNTER
Patient calling with complaints of reoccurring cellulitis, patient states that Mikel RAMACHANDRAN has evaluated and treated this condition in the past? Patient requesting an antibiotic? Please advise.

## 2021-08-25 RX ORDER — CEPHALEXIN 500 MG/1
500 CAPSULE ORAL 3 TIMES DAILY
Qty: 21 CAPSULE | Refills: 0 | Status: SHIPPED | OUTPATIENT
Start: 2021-08-25 | End: 2022-10-18

## 2021-08-25 NOTE — TELEPHONE ENCOUNTER
Wants antibiotic sent to different [facility] having issues with [facility]

## 2021-09-15 ENCOUNTER — OFFICE VISIT (OUTPATIENT)
Dept: FAMILY MEDICINE CLINIC | Facility: CLINIC | Age: 81
End: 2021-09-15

## 2021-09-15 VITALS
SYSTOLIC BLOOD PRESSURE: 140 MMHG | OXYGEN SATURATION: 98 % | HEIGHT: 66 IN | DIASTOLIC BLOOD PRESSURE: 70 MMHG | BODY MASS INDEX: 31.72 KG/M2 | TEMPERATURE: 98.4 F | HEART RATE: 78 BPM | WEIGHT: 197.4 LBS

## 2021-09-15 DIAGNOSIS — R09.82 POSTNASAL DRIP: ICD-10-CM

## 2021-09-15 DIAGNOSIS — M16.9 HIP ARTHROSIS: ICD-10-CM

## 2021-09-15 DIAGNOSIS — Z00.00 ENCOUNTER FOR MEDICARE ANNUAL WELLNESS EXAM: Primary | ICD-10-CM

## 2021-09-15 DIAGNOSIS — E55.9 VITAMIN D DEFICIENCY: ICD-10-CM

## 2021-09-15 DIAGNOSIS — N18.31 STAGE 3A CHRONIC KIDNEY DISEASE (HCC): ICD-10-CM

## 2021-09-15 DIAGNOSIS — E78.2 MIXED HYPERLIPIDEMIA: ICD-10-CM

## 2021-09-15 DIAGNOSIS — I10 ESSENTIAL HYPERTENSION: ICD-10-CM

## 2021-09-15 DIAGNOSIS — M48.061 DEGENERATIVE LUMBAR SPINAL STENOSIS: ICD-10-CM

## 2021-09-15 PROCEDURE — G0439 PPPS, SUBSEQ VISIT: HCPCS | Performed by: FAMILY MEDICINE

## 2021-09-15 PROCEDURE — 99214 OFFICE O/P EST MOD 30 MIN: CPT | Performed by: FAMILY MEDICINE

## 2021-09-15 RX ORDER — HYDROCODONE BITARTRATE AND ACETAMINOPHEN 10; 325 MG/1; MG/1
1 TABLET ORAL EVERY 6 HOURS PRN
Qty: 120 TABLET | Refills: 0 | Status: SHIPPED | OUTPATIENT
Start: 2021-09-15 | End: 2021-10-13 | Stop reason: SDUPTHER

## 2021-09-15 NOTE — PROGRESS NOTES
"The ABCs of the Annual Wellness Visit  Subsequent Medicare Wellness Visit    Chief Complaint   Patient presents with   • Medicare Wellness-subsequent     YEARLY WELLNESS NON FASTING   • PND     CONSTANTLY CLEARING THROAT   • Hypertension   • Hyperlipidemia   • Osteoarthritis       Subjective   History of Present Illness:  Kike Rubio is a 80 y.o. male who presents for a Subsequent Medicare Wellness Visit.    Presents for annual wellness  And  3-month follow-up for chronic OA/hip arthrosis and lumbar DDD  And  6-month follow-up on HTN, CKD, hyperlipidemia, and complaints of lots of postnasal drainage    LOV with me in June for chronic med refills and an exacerbation of his Hip OA.  At that visit, Medrol Dosepak was given for an upcoming trip/long drive to [place] for his son's wedding.    Overall, continues to do fairly well.  However, has been through quite a bit of stressors recently due to his wife's new diagnosis of lymphoma.  She has recently started chemotherapy, tolerating okay.  He expresses concern about if her \"chemo brain\" will ever get back to normal.    Only complaint today is --lots of postnasal drainage.  Denies acute infection.  Currently takes OTC antihistamine on daily basis, only somewhat effective.  Denies fever, headache, loss of taste or smell.    Otherwise, no complaints.  He did recently use the Medrol Dosepak for his long trip to [place].  He did inadvertently stop his HCTZ and subsequently had more lower extremity edema.  However, he is back on all medications as prescribed.    Needs refills today.  Compliant with and tolerates all medications without side effects.  Still takes Norco a quantity of 4/day for significant hip OA and lumbar DDD.  Intolerant to NSAIDs due to history of CKD.  Although, he really likes and benefits greatly from the Mobic and still will take it 2-3 times a week.  Last Urine Tox screen for compliance was in February 2021 and consistent with prescription " history.  Kunal report reviewed by me today shows last refill for Norco quantity 120 on 8/12/2021.    Routine health maintenance/screening test:  Colorectal screening declined in past, age 80 now.  All vaccinations up-to-date.  Non-smoker, social alcohol only.  Ophthalmology exam, dental exams up-to-date.  Currently in the process of having a new will, living will, and POA obtain.  Tries to maintain a healthy well-balanced diet, no cardio exercise due to significant immobility.  Family history noncontributory    HEALTH RISK ASSESSMENT    Recent Hospitalizations:  No hospitalization(s) within the last year.    Current Medical Providers:  Patient Care Team:  Kina Morin MD as PCP - General (Family Medicine)    Smoking Status:  Social History     Tobacco Use   Smoking Status Never Smoker       Alcohol Consumption:  Social History     Substance and Sexual Activity   Alcohol Use Yes    Comment: BEER OCCASIONALLY       Depression Screen:   PHQ-2/PHQ-9 Depression Screening 9/15/2021   Little interest or pleasure in doing things 0   Feeling down, depressed, or hopeless 0   Total Score 0       Fall Risk Screen:  STEADI Fall Risk Assessment was completed, and patient is at MODERATE risk for falls. Assessment completed on:9/15/2021    Health Habits and Functional and Cognitive Screening:  Functional & Cognitive Status 9/15/2021   Do you have difficulty preparing food and eating? No   Do you have difficulty bathing yourself, getting dressed or grooming yourself? No   Do you have difficulty using the toilet? No   Do you have difficulty moving around from place to place? Yes   Do you have trouble with steps or getting out of a bed or a chair? Yes   Current Diet Well Balanced Diet   Dental Exam Not up to date   Eye Exam Not up to date   Exercise (times per week) 0 times per week   Current Exercises Include No Regular Exercise   Current Exercise Activities Include -   Do you need help using the phone?  No   Are you deaf or  do you have serious difficulty hearing?  No   Do you need help with transportation? Yes   Do you need help shopping? No   Do you need help preparing meals?  No   Do you need help with housework?  No   Do you need help with laundry? No   Do you need help taking your medications? No   Do you need help managing money? No   Do you ever drive or ride in a car without wearing a seat belt? No         Does the patient have evidence of cognitive impairment? No    Asprin use counseling:Taking ASA appropriately as indicated    Age-appropriate Screening Schedule:  Refer to the list below for future screening recommendations based on patient's age, sex and/or medical conditions. Orders for these recommended tests are listed in the plan section. The patient has been provided with a written plan.    Health Maintenance   Topic Date Due   • ZOSTER VACCINE (1 of 2) 10/01/2021 (Originally [birth year]1990)   • TDAP/TD VACCINES (2 - Td or Tdap) 09/28/2028 (Originally 1/1/2028)   • INFLUENZA VACCINE  10/01/2021   • LIPID PANEL  09/15/2022          The following portions of the patient's history were reviewed and updated as appropriate: allergies, current medications, past family history, past medical history, past social history, past surgical history and problem list.    Outpatient Medications Prior to Visit   Medication Sig Dispense Refill   • allopurinol (ZYLOPRIM) 100 MG tablet TAKE 1 TABLET EVERY DAY 90 tablet 0   • aspirin 325 MG tablet Take 325 mg by mouth Daily.     • Cholecalciferol (VITAMIN D) 50 MCG (2000 UT) tablet Take 2,000 Units by mouth Daily.     • coenzyme Q10 100 MG capsule Take 100 mg by mouth Daily.     • DULERA 100-5 MCG/ACT inhaler Inhale 2 puffs 2 (Two) Times a Day. 13 g 5   • DULoxetine (CYMBALTA) 30 MG capsule Take 1 capsule by mouth Daily. 90 capsule 1   • FLAXSEED, LINSEED, PO Take  by mouth.     • Gluc-Chonn-MSM-Boswellia-Vit D (GLUCOSAMINE CHONDROITIN COMPLX) tablet Take  by mouth.     • hydroCHLOROthiazide  (HYDRODIURIL) 12.5 MG tablet TAKE 1 TABLET EVERY DAY 90 tablet 0   • Ipratropium-Albuterol (COMBIVENT IN) Inhale 2 puffs.     • Magnesium 250 MG tablet Take 2 tablets by mouth Daily.     • nystatin (MYCOSTATIN) 445618 UNIT/ML suspension Swish and swallow 5 mL 4 (Four) Times a Day. Continue use for 2 days past symptom resolution. 150 mL 0   • Omega-3 Fatty Acids (FISH OIL) 1000 MG capsule capsule Take  by mouth.     • omeprazole (priLOSEC) 20 MG capsule Take 1 capsule by mouth Daily. 90 capsule 1   • potassium chloride (K-DUR,KLOR-CON) 10 MEQ CR tablet Take 10 mEq by mouth Daily.     • rosuvastatin (CRESTOR) 20 MG tablet Take 1 tablet by mouth Daily. 90 tablet 1   • spironolactone (ALDACTONE) 50 MG tablet Take 1 tablet by mouth Daily. 90 tablet 1   • HYDROcodone-acetaminophen (NORCO)  MG per tablet Take 1 tablet by mouth Every 6 (Six) Hours As Needed for Moderate Pain . 120 tablet 0   • cephalexin (Keflex) 500 MG capsule Take 1 capsule by mouth 3 (Three) Times a Day. 21 capsule 0   • diazePAM (Valium) 5 MG tablet Take 1 tablet by mouth 2 (Two) Times a Day As Needed for Anxiety. 5 tablet 0   • methylPREDNISolone (MEDROL) 4 MG dose pack Take as directed on package instructions. 1 each 0   • methylPREDNISolone (MEDROL) 4 MG dose pack Take as directed on package instructions. 1 each 0     No facility-administered medications prior to visit.       Patient Active Problem List   Diagnosis   • GERD (gastroesophageal reflux disease)   • Hypertension   • Mechanical complication of hip prosthesis (CMS/HCC)   • Sleep apnea   • Stage 3 chronic kidney disease (CMS/HCC)   • Degenerative lumbar spinal stenosis   • Vitamin D deficiency   • Chronic idiopathic gout of multiple sites   • Other emphysema (CMS/HCC)   • Mixed hyperlipidemia   • Gynecomastia, male   • Anxiety with depression   • Hip arthrosis   • Venous stasis of lower extremity   • Laceration of right thumb without foreign body without damage to nail   • Postnasal  "drip       Advanced Care Planning:  ACP discussion was held with the patient during this visit. Patient has an advance directive (not in EMR), copy requested.    Review of Systems   Constitutional: Negative for fever and unexpected weight change.   HENT: Positive for postnasal drip.    Respiratory: Negative for cough and shortness of breath.    Cardiovascular: Negative for chest pain.       Compared to one year ago, the patient feels his physical health is the same.  Compared to one year ago, the patient feels his mental health is the same.    Reviewed chart for potential of high risk medication in the elderly: yes  Reviewed chart for potential of harmful drug interactions in the elderly:yes    Objective         Vitals:    09/15/21 1439   BP: 140/70   Pulse: 78   Temp: 98.4 °F (36.9 °C)   SpO2: 98%   Weight: 89.5 kg (197 lb 6.4 oz)   Height: 167.6 cm (66\")       Body mass index is 31.86 kg/m².  Discussed the patient's BMI with him. The BMI is above average; BMI management plan is completed.    Physical Exam  Vitals and nursing note reviewed.   Constitutional:       Appearance: Normal appearance. He is well-developed. He is obese.   HENT:      Head: Normocephalic and atraumatic.      Comments: Oropharynx --with moderate gray cobblestoning appearance     Nose: Nose normal.   Eyes:      Conjunctiva/sclera: Conjunctivae normal.      Pupils: Pupils are equal, round, and reactive to light.   Neck:      Thyroid: No thyromegaly.   Cardiovascular:      Rate and Rhythm: Normal rate and regular rhythm.      Heart sounds: Normal heart sounds. No murmur heard.        Comments: Trace bilateral edema, chronic, left greater than right with venous stasis changes, all stable  Pulmonary:      Effort: Pulmonary effort is normal.      Breath sounds: Normal breath sounds.   Abdominal:      General: Abdomen is flat. Bowel sounds are normal. There is no distension.      Palpations: Abdomen is soft. There is no hepatomegaly, splenomegaly or " mass.      Tenderness: There is no abdominal tenderness. There is no guarding or rebound.      Hernia: No hernia is present.   Musculoskeletal:         General: Normal range of motion.      Cervical back: Normal range of motion and neck supple.      Right lower leg: No edema.      Left lower leg: No edema.   Lymphadenopathy:      Cervical: No cervical adenopathy.   Skin:     General: Skin is warm.   Neurological:      General: No focal deficit present.      Mental Status: He is alert.   Psychiatric:         Mood and Affect: Mood normal.         Behavior: Behavior normal.         Thought Content: Thought content normal.         Judgment: Judgment normal.     LABS --April 2021 --GFR 42, normal BMP otherwise, normal CBC  Last lipids WNL in February 2021    Lab Results   Component Value Date     (H) 09/15/2021    CHLPL 188 09/15/2021    TRIG 157 (H) 09/15/2021    HDL 55 09/15/2021     (H) 09/15/2021    VLDL 27 09/15/2021        Procedures       Assessment/Plan   Medicare Risks and Personalized Health Plan  CMS Preventative Services Quick Reference  Advance Directive Discussion  Cardiovascular risk  Chronic Pain   Colon Cancer Screening  Depression/Dysphoria  Diabetic Lab Screening   Fall Risk  Glaucoma Risk  Immunizations Discussed/Encouraged (specific immunizations; Tdap, Influenza, Pneumococcal 23, Shingrix and COVID19 )  Obesity/Overweight     The above risks/problems have been discussed with the patient.  Pertinent information has been shared with the patient in the After Visit Summary.  Follow up plans and orders are seen below in the Assessment/Plan Section.    Diagnoses and all orders for this visit:    1. Encounter for Medicare annual wellness exam (Primary)  - S/P subsequent  exam done, WNL.  All screening that is applicable is up-to-date; however, needs CBC, influenza vaccine (plans to obtain in about 2 weeks,) and have requested a copy of his new Living Will.   Otherwise, weight loss is  needed--Needs low-carb/low calorie/low cholesterol diet and increase cardio exercise to greater than 150 minutes weekly  -     CBC & Differential    2. Essential hypertension  -controlled.  No change with medication.  Recheck renal function today.  Continue HCTZ and Aldactone as prescribed, will refill upon request  Continue  mg daily  -     Comprehensive Metabolic Panel    3. Mixed hyperlipidemia  -well-controlled.  Due to recheck lipids and CMP today.  Ideally goal LDL needs to be less than 100, at least less than 130 given history of HTN  If at goal then no change with Crestor 20 mg daily, will refill upon request  Needs to continue with low-cholesterol diet  -     Comprehensive Metabolic Panel  -     Lipid Panel With LDL / HDL Ratio    4. Stage 3a chronic kidney disease (CMS/HCC)  -stable.  Due to recheck renal function today  Still advise no NSAIDs, however as long as renal function remains stable patient declines this advice and will continue to take Mobic on an as needed basis only  -     CBC & Differential  -     Comprehensive Metabolic Panel    5. Vitamin D deficiency  -controlled?  Recheck vitamin D  -     Vitamin D 25 Hydroxy    6. Hip arthrosis  -controlled.  Urine Tox screen/February and Kunal both reviewed, appropriate.  No change of medication.  Refill [facility] as directed below, quantity 120 per 30 days  -     HYDROcodone-acetaminophen (NORCO)  MG per tablet; Take 1 tablet by mouth Every 6 (Six) Hours As Needed for Moderate Pain .  Dispense: 120 tablet; Refill: 0    7. Degenerative lumbar spinal stenosis same plan as above    8. Postnasal drip  -uncontrolled.  Recommend adding daily Flonase OTC 2 sprays each nostril daily.  Continue with OTC antihistamine.  If not better may need to consider Astelin nasal spray?      Follow Up:  Return in about 3 months (around 12/15/2021) for Recheck.     An After Visit Summary and PPPS were given to the patient.  Wore goggles and face mask during entire  visit

## 2021-09-16 LAB
25(OH)D3+25(OH)D2 SERPL-MCNC: 61.8 NG/ML (ref 30–100)
ALBUMIN SERPL-MCNC: 4.3 G/DL (ref 3.5–5.2)
ALBUMIN/GLOB SERPL: 1.8 G/DL
ALP SERPL-CCNC: 106 U/L (ref 39–117)
ALT SERPL-CCNC: 19 U/L (ref 1–41)
AST SERPL-CCNC: 28 U/L (ref 1–40)
BASOPHILS # BLD AUTO: 0.04 10*3/MM3 (ref 0–0.2)
BASOPHILS NFR BLD AUTO: 0.7 % (ref 0–1.5)
BILIRUB SERPL-MCNC: 0.3 MG/DL (ref 0–1.2)
BUN SERPL-MCNC: 36 MG/DL (ref 8–23)
BUN/CREAT SERPL: 22.5 (ref 7–25)
CALCIUM SERPL-MCNC: 9.7 MG/DL (ref 8.6–10.5)
CHLORIDE SERPL-SCNC: 101 MMOL/L (ref 98–107)
CHOLEST SERPL-MCNC: 188 MG/DL (ref 0–200)
CO2 SERPL-SCNC: 27.1 MMOL/L (ref 22–29)
CREAT SERPL-MCNC: 1.6 MG/DL (ref 0.76–1.27)
EOSINOPHIL # BLD AUTO: 0.03 10*3/MM3 (ref 0–0.4)
EOSINOPHIL NFR BLD AUTO: 0.6 % (ref 0.3–6.2)
ERYTHROCYTE [DISTWIDTH] IN BLOOD BY AUTOMATED COUNT: 12.6 % (ref 12.3–15.4)
GLOBULIN SER CALC-MCNC: 2.4 GM/DL
GLUCOSE SERPL-MCNC: 121 MG/DL (ref 65–99)
HCT VFR BLD AUTO: 40.2 % (ref 37.5–51)
HDLC SERPL-MCNC: 55 MG/DL (ref 40–60)
HGB BLD-MCNC: 13.6 G/DL (ref 13–17.7)
IMM GRANULOCYTES # BLD AUTO: 0.04 10*3/MM3 (ref 0–0.05)
IMM GRANULOCYTES NFR BLD AUTO: 0.7 % (ref 0–0.5)
LDLC SERPL CALC-MCNC: 106 MG/DL (ref 0–100)
LDLC/HDLC SERPL: 1.85 {RATIO}
LYMPHOCYTES # BLD AUTO: 0.65 10*3/MM3 (ref 0.7–3.1)
LYMPHOCYTES NFR BLD AUTO: 12 % (ref 19.6–45.3)
MCH RBC QN AUTO: 32.5 PG (ref 26.6–33)
MCHC RBC AUTO-ENTMCNC: 33.8 G/DL (ref 31.5–35.7)
MCV RBC AUTO: 96.2 FL (ref 79–97)
MONOCYTES # BLD AUTO: 0.42 10*3/MM3 (ref 0.1–0.9)
MONOCYTES NFR BLD AUTO: 7.8 % (ref 5–12)
NEUTROPHILS # BLD AUTO: 4.22 10*3/MM3 (ref 1.7–7)
NEUTROPHILS NFR BLD AUTO: 78.2 % (ref 42.7–76)
NRBC BLD AUTO-RTO: 0 /100 WBC (ref 0–0.2)
PLATELET # BLD AUTO: 178 10*3/MM3 (ref 140–450)
POTASSIUM SERPL-SCNC: 4.6 MMOL/L (ref 3.5–5.2)
PROT SERPL-MCNC: 6.7 G/DL (ref 6–8.5)
RBC # BLD AUTO: 4.18 10*6/MM3 (ref 4.14–5.8)
SODIUM SERPL-SCNC: 138 MMOL/L (ref 136–145)
TRIGL SERPL-MCNC: 157 MG/DL (ref 0–150)
VLDLC SERPL CALC-MCNC: 27 MG/DL (ref 5–40)
WBC # BLD AUTO: 5.4 10*3/MM3 (ref 3.4–10.8)

## 2021-09-28 ENCOUNTER — TELEPHONE (OUTPATIENT)
Dept: FAMILY MEDICINE CLINIC | Facility: CLINIC | Age: 81
End: 2021-09-28

## 2021-09-28 NOTE — TELEPHONE ENCOUNTER
----- Message from Kike Rubio sent at 9/28/2021  1:56 PM EDT -----  Regarding: Referral Request  Contact: [phone]  Dr. Morin,  I am dizzy, tired and sleep too much.   Would you please make me an appointment for an ultrasound on my neck?  Not on a Monday or Tuesday.  Melinda has Her chemo on those days.    Thanks  Kike Rubio

## 2021-10-13 DIAGNOSIS — M16.9 HIP ARTHROSIS: ICD-10-CM

## 2021-10-13 NOTE — TELEPHONE ENCOUNTER
Rx Refill Note  Requested Prescriptions     Pending Prescriptions Disp Refills   • HYDROcodone-acetaminophen (NORCO)  MG per tablet 120 tablet 0     Sig: Take 1 tablet by mouth Every 6 (Six) Hours As Needed for Moderate Pain .      Last office visit with prescribing clinician: 9/15/2021      Next office visit with prescribing clinician: 12/21/2021            Donte Ramires MA  10/13/21, 13:24 EDT

## 2021-10-16 RX ORDER — HYDROCODONE BITARTRATE AND ACETAMINOPHEN 10; 325 MG/1; MG/1
1 TABLET ORAL EVERY 6 HOURS PRN
Qty: 120 TABLET | Refills: 0 | Status: SHIPPED | OUTPATIENT
Start: 2021-10-16 | End: 2021-11-11 | Stop reason: SDUPTHER

## 2021-11-11 DIAGNOSIS — M16.9 HIP ARTHROSIS: ICD-10-CM

## 2021-11-12 NOTE — TELEPHONE ENCOUNTER
Rx Refill Note  Requested Prescriptions     Pending Prescriptions Disp Refills   • HYDROcodone-acetaminophen (NORCO)  MG per tablet 120 tablet 0     Sig: Take 1 tablet by mouth Every 6 (Six) Hours As Needed for Moderate Pain .      Last office visit with prescribing clinician: 9/15/2021      Next office visit with prescribing clinician: 12/21/2021            Donte Ramires MA  11/12/21, 08:11 EST

## 2021-11-17 RX ORDER — HYDROCODONE BITARTRATE AND ACETAMINOPHEN 10; 325 MG/1; MG/1
1 TABLET ORAL EVERY 6 HOURS PRN
Qty: 120 TABLET | Refills: 0 | Status: SHIPPED | OUTPATIENT
Start: 2021-11-17 | End: 2021-12-21 | Stop reason: SDUPTHER

## 2021-12-21 ENCOUNTER — OFFICE VISIT (OUTPATIENT)
Dept: FAMILY MEDICINE CLINIC | Facility: CLINIC | Age: 81
End: 2021-12-21

## 2021-12-21 VITALS
SYSTOLIC BLOOD PRESSURE: 112 MMHG | HEART RATE: 80 BPM | WEIGHT: 197 LBS | DIASTOLIC BLOOD PRESSURE: 62 MMHG | BODY MASS INDEX: 31.66 KG/M2 | HEIGHT: 66 IN | OXYGEN SATURATION: 98 % | TEMPERATURE: 97.9 F

## 2021-12-21 DIAGNOSIS — F41.8 ANXIETY WITH DEPRESSION: ICD-10-CM

## 2021-12-21 DIAGNOSIS — N18.31 STAGE 3A CHRONIC KIDNEY DISEASE (HCC): ICD-10-CM

## 2021-12-21 DIAGNOSIS — G25.2 INTENTION TREMOR: ICD-10-CM

## 2021-12-21 DIAGNOSIS — M16.9 HIP ARTHROSIS: Primary | ICD-10-CM

## 2021-12-21 PROCEDURE — 99214 OFFICE O/P EST MOD 30 MIN: CPT | Performed by: FAMILY MEDICINE

## 2021-12-21 RX ORDER — HYDROCODONE BITARTRATE AND ACETAMINOPHEN 10; 325 MG/1; MG/1
1 TABLET ORAL EVERY 6 HOURS PRN
Qty: 120 TABLET | Refills: 0 | Status: SHIPPED | OUTPATIENT
Start: 2021-12-21 | End: 2022-01-19 | Stop reason: SDUPTHER

## 2021-12-21 RX ORDER — DULOXETINE 40 MG/1
40 CAPSULE, DELAYED RELEASE ORAL DAILY
Qty: 90 CAPSULE | Refills: 1 | Status: SHIPPED | OUTPATIENT
Start: 2021-12-21 | End: 2022-01-25 | Stop reason: SDUPTHER

## 2021-12-29 ENCOUNTER — TELEPHONE (OUTPATIENT)
Dept: FAMILY MEDICINE CLINIC | Facility: CLINIC | Age: 81
End: 2021-12-29

## 2021-12-29 NOTE — TELEPHONE ENCOUNTER
Caller: RADHA     Relationship: [facility]     Best call back number: [phone]    What orders are you requesting (i.e. lab or imaging): PHYSICAL THERAPY 3 TIMES A WEEK FOR 4 WEEKS    Additional notes: RADHA WITH [employer] CALLING NEEDING VERBAL ORDERS

## 2021-12-30 ENCOUNTER — TELEPHONE (OUTPATIENT)
Dept: FAMILY MEDICINE CLINIC | Facility: CLINIC | Age: 81
End: 2021-12-30

## 2021-12-30 NOTE — TELEPHONE ENCOUNTER
Caller: [facility]    Relationship: Other    Best call back number: [phone]    What was the call regarding: JHOAN CALLED TO LET US KNOW THEY ARE SHORT STAFFED TODAY AND SHE HAS PUSHED PATIENT'S HOME VISIT FOR HOME HEALTH EVAL TO Monday, SHE ALREADY SPOKE WITH PATIENT AND HE STATED THAT WAS OKAY.    Do you require a callback: NO

## 2022-01-17 ENCOUNTER — TELEPHONE (OUTPATIENT)
Dept: FAMILY MEDICINE CLINIC | Facility: CLINIC | Age: 82
End: 2022-01-17

## 2022-01-19 DIAGNOSIS — M16.9 HIP ARTHROSIS: ICD-10-CM

## 2022-01-19 NOTE — TELEPHONE ENCOUNTER
Caller: Kike Rubio    Relationship: Self    Best call back number: [phone]     Requested Prescriptions:   Requested Prescriptions     Pending Prescriptions Disp Refills   • Dulera 100-5 MCG/ACT inhaler 13 g 5     Sig: Inhale 2 puffs 2 (Two) Times a Day.        Pharmacy where request should be sent: [facility][id number] [address] AT [facility] [phone] [facility] [phone]      Additional details provided by patient: PATIENT WOULD LIKE TO RECEIVE THREE AT A TIME.    Does the patient have less than a 3 day supply:  [x] Yes  [] No    Clark Webb Rep   01/19/22 14:56 EST

## 2022-01-19 NOTE — TELEPHONE ENCOUNTER
Rx Refill Note  Requested Prescriptions     Pending Prescriptions Disp Refills   • Dulera 100-5 MCG/ACT inhaler 13 g 5     Sig: Inhale 2 puffs 2 (Two) Times a Day.   • HYDROcodone-acetaminophen (NORCO)  MG per tablet 120 tablet 0     Sig: Take 1 tablet by mouth Every 6 (Six) Hours As Needed for Moderate Pain .      Last office visit with prescribing clinician: 12/21/2021      Next office visit with prescribing clinician: 3/29/2022            Donte Ramires MA  01/19/22, 14:47 EST

## 2022-01-23 RX ORDER — HYDROCODONE BITARTRATE AND ACETAMINOPHEN 10; 325 MG/1; MG/1
1 TABLET ORAL EVERY 6 HOURS PRN
Qty: 120 TABLET | Refills: 0 | Status: SHIPPED | OUTPATIENT
Start: 2022-01-23 | End: 2022-03-08 | Stop reason: SDUPTHER

## 2022-01-25 DIAGNOSIS — M16.9 HIP ARTHROSIS: ICD-10-CM

## 2022-01-25 RX ORDER — DULOXETINE 40 MG/1
40 CAPSULE, DELAYED RELEASE ORAL DAILY
Qty: 90 CAPSULE | Refills: 1 | Status: SHIPPED | OUTPATIENT
Start: 2022-01-25 | End: 2022-02-01

## 2022-01-25 RX ORDER — ALLOPURINOL 100 MG/1
100 TABLET ORAL DAILY
Qty: 90 TABLET | Refills: 1 | Status: SHIPPED | OUTPATIENT
Start: 2022-01-25 | End: 2022-02-01 | Stop reason: SDUPTHER

## 2022-01-25 RX ORDER — HYDROCODONE BITARTRATE AND ACETAMINOPHEN 10; 325 MG/1; MG/1
1 TABLET ORAL EVERY 6 HOURS PRN
Qty: 120 TABLET | Refills: 0 | OUTPATIENT
Start: 2022-01-25

## 2022-01-25 RX ORDER — OMEPRAZOLE 20 MG/1
20 CAPSULE, DELAYED RELEASE ORAL DAILY
Qty: 90 CAPSULE | Refills: 1 | Status: SHIPPED | OUTPATIENT
Start: 2022-01-25 | End: 2022-02-01 | Stop reason: SDUPTHER

## 2022-01-25 RX ORDER — ROSUVASTATIN CALCIUM 20 MG/1
20 TABLET, COATED ORAL DAILY
Qty: 90 TABLET | Refills: 1 | Status: SHIPPED | OUTPATIENT
Start: 2022-01-25 | End: 2022-02-01 | Stop reason: SDUPTHER

## 2022-01-25 RX ORDER — HYDROCHLOROTHIAZIDE 12.5 MG/1
12.5 TABLET ORAL DAILY
Qty: 90 TABLET | Refills: 1 | Status: SHIPPED | OUTPATIENT
Start: 2022-01-25 | End: 2022-02-01 | Stop reason: SDUPTHER

## 2022-01-25 RX ORDER — SPIRONOLACTONE 50 MG/1
50 TABLET, FILM COATED ORAL DAILY
Qty: 90 TABLET | Refills: 1 | Status: SHIPPED | OUTPATIENT
Start: 2022-01-25 | End: 2022-02-01 | Stop reason: SDUPTHER

## 2022-01-25 NOTE — TELEPHONE ENCOUNTER
PATIENT NEEDS REFILL ON:ALL 90 DAYS hydroCHLOROthiazide (HYDRODIURIL) 12.5 MG tablet  HYDROcodone-acetaminophen (NORCO)  MG per tablet HE SAID HE DIDN'T GET FULL AMOUNT ONLY 12 PILLS    spironolactone (ALDACTONE) 50 MG tablet   omeprazole (priLOSEC) 20 MG capsule   DULoxetine 40 MG capsule delayed-release particles  allopurinol (ZYLOPRIM) 100 MG tablet  rosuvastatin (CRESTOR) 20 MG tablet         PATIENT CAN BE REACHED ON: [phone]    PHARMACY PREFERRED:    [id number] [address] AT [facility] [address] - [phone]  - [phone]   [phone]

## 2022-02-01 ENCOUNTER — TELEPHONE (OUTPATIENT)
Dept: FAMILY MEDICINE CLINIC | Facility: CLINIC | Age: 82
End: 2022-02-01

## 2022-02-01 RX ORDER — SPIRONOLACTONE 50 MG/1
50 TABLET, FILM COATED ORAL DAILY
Qty: 90 TABLET | Refills: 3 | Status: SHIPPED | OUTPATIENT
Start: 2022-02-01 | End: 2023-02-15

## 2022-02-01 RX ORDER — HYDROCHLOROTHIAZIDE 12.5 MG/1
12.5 TABLET ORAL DAILY
Qty: 90 TABLET | Refills: 3 | Status: SHIPPED | OUTPATIENT
Start: 2022-02-01 | End: 2023-02-15

## 2022-02-01 RX ORDER — ALLOPURINOL 100 MG/1
100 TABLET ORAL DAILY
Qty: 90 TABLET | Refills: 1 | Status: SHIPPED | OUTPATIENT
Start: 2022-02-01 | End: 2022-07-05 | Stop reason: SDUPTHER

## 2022-02-01 RX ORDER — ROSUVASTATIN CALCIUM 20 MG/1
20 TABLET, COATED ORAL DAILY
Qty: 90 TABLET | Refills: 1 | Status: SHIPPED | OUTPATIENT
Start: 2022-02-01

## 2022-02-01 RX ORDER — DULOXETIN HYDROCHLORIDE 30 MG/1
30 CAPSULE, DELAYED RELEASE ORAL DAILY
Qty: 90 CAPSULE | Refills: 3 | Status: SHIPPED | OUTPATIENT
Start: 2022-02-01 | End: 2023-01-11

## 2022-02-01 RX ORDER — OMEPRAZOLE 20 MG/1
20 CAPSULE, DELAYED RELEASE ORAL DAILY
Qty: 90 CAPSULE | Refills: 3 | Status: SHIPPED | OUTPATIENT
Start: 2022-02-01 | End: 2022-10-18

## 2022-02-01 NOTE — TELEPHONE ENCOUNTER
Caller: Kike Rubio    Relationship: Self    Best call back number:     What is the best time to reach you:     Who are you requesting to speak with (clinical staff, provider,  specific staff member): MEGHANA    Do you know the name of the person who called:     What was the call regarding: PATIENT WANTS TO TALK TO MEGHANA ABOUT A MEDICATION HE IS HAVING ISSUE WITH    Do you require a callback: YES

## 2022-03-08 DIAGNOSIS — M16.9 HIP ARTHROSIS: ICD-10-CM

## 2022-03-09 NOTE — TELEPHONE ENCOUNTER
Rx Refill Note  Requested Prescriptions     Pending Prescriptions Disp Refills   • HYDROcodone-acetaminophen (NORCO)  MG per tablet 120 tablet 0     Sig: Take 1 tablet by mouth Every 6 (Six) Hours As Needed for Moderate Pain .      Last office visit with prescribing clinician: 12/21/2021      Next office visit with prescribing clinician: 3/29/2022            Donte Ramires MA  03/09/22, 08:12 EST

## 2022-03-10 RX ORDER — HYDROCODONE BITARTRATE AND ACETAMINOPHEN 10; 325 MG/1; MG/1
1 TABLET ORAL EVERY 6 HOURS PRN
Qty: 120 TABLET | Refills: 0 | Status: SHIPPED | OUTPATIENT
Start: 2022-03-10 | End: 2022-03-29 | Stop reason: SDUPTHER

## 2022-03-29 ENCOUNTER — OFFICE VISIT (OUTPATIENT)
Dept: FAMILY MEDICINE CLINIC | Facility: CLINIC | Age: 82
End: 2022-03-29

## 2022-03-29 VITALS
HEIGHT: 66 IN | DIASTOLIC BLOOD PRESSURE: 60 MMHG | SYSTOLIC BLOOD PRESSURE: 110 MMHG | HEART RATE: 80 BPM | OXYGEN SATURATION: 97 % | WEIGHT: 195.2 LBS | BODY MASS INDEX: 31.37 KG/M2 | TEMPERATURE: 98.4 F

## 2022-03-29 DIAGNOSIS — S72.402D CLOSED FRACTURE OF DISTAL END OF LEFT FEMUR WITH ROUTINE HEALING, UNSPECIFIED FRACTURE MORPHOLOGY, SUBSEQUENT ENCOUNTER: ICD-10-CM

## 2022-03-29 DIAGNOSIS — Z79.899 HIGH RISK MEDICATION USE: ICD-10-CM

## 2022-03-29 DIAGNOSIS — E55.9 VITAMIN D DEFICIENCY: ICD-10-CM

## 2022-03-29 DIAGNOSIS — E78.2 MIXED HYPERLIPIDEMIA: ICD-10-CM

## 2022-03-29 DIAGNOSIS — I10 PRIMARY HYPERTENSION: ICD-10-CM

## 2022-03-29 DIAGNOSIS — F41.1 GAD (GENERALIZED ANXIETY DISORDER): ICD-10-CM

## 2022-03-29 DIAGNOSIS — F33.0 MILD EPISODE OF RECURRENT MAJOR DEPRESSIVE DISORDER: ICD-10-CM

## 2022-03-29 DIAGNOSIS — Z12.5 ENCOUNTER FOR SCREENING FOR MALIGNANT NEOPLASM OF PROSTATE: ICD-10-CM

## 2022-03-29 DIAGNOSIS — M48.061 DEGENERATIVE LUMBAR SPINAL STENOSIS: ICD-10-CM

## 2022-03-29 DIAGNOSIS — N18.31 STAGE 3A CHRONIC KIDNEY DISEASE: ICD-10-CM

## 2022-03-29 DIAGNOSIS — C61 PROSTATE CANCER: Primary | ICD-10-CM

## 2022-03-29 DIAGNOSIS — Z09 HOSPITAL DISCHARGE FOLLOW-UP: Primary | ICD-10-CM

## 2022-03-29 DIAGNOSIS — C61 PROSTATE CANCER: ICD-10-CM

## 2022-03-29 DIAGNOSIS — M16.9 HIP ARTHROSIS: ICD-10-CM

## 2022-03-29 PROBLEM — M20.42 ACQUIRED HAMMER TOE OF LEFT FOOT: Status: ACTIVE | Noted: 2022-03-29

## 2022-03-29 PROBLEM — Z85.46 HISTORY OF PROSTATE CANCER: Status: RESOLVED | Noted: 2022-03-29 | Resolved: 2022-03-29

## 2022-03-29 PROBLEM — K21.9 GASTROESOPHAGEAL REFLUX DISEASE: Status: ACTIVE | Noted: 2020-02-12

## 2022-03-29 PROBLEM — M1A.9XX0 CHRONIC GOUTY ARTHRITIS: Status: ACTIVE | Noted: 2020-02-12

## 2022-03-29 PROBLEM — K46.9 ABDOMINAL HERNIA: Status: ACTIVE | Noted: 2022-03-29

## 2022-03-29 PROBLEM — S72.92XA CLOSED FRACTURE OF LEFT FEMUR (HCC): Status: RESOLVED | Noted: 2022-01-28 | Resolved: 2022-03-29

## 2022-03-29 PROBLEM — F41.8 ANXIETY WITH DEPRESSION: Status: RESOLVED | Noted: 2020-05-19 | Resolved: 2022-03-29

## 2022-03-29 PROBLEM — I87.9 DISORDER OF VEIN OF LOWER EXTREMITY: Status: ACTIVE | Noted: 2020-08-21

## 2022-03-29 PROBLEM — S72.92XA CLOSED FRACTURE OF LEFT FEMUR: Status: ACTIVE | Noted: 2022-01-28

## 2022-03-29 PROBLEM — Z85.46 HISTORY OF PROSTATE CANCER: Status: ACTIVE | Noted: 2022-03-29

## 2022-03-29 PROBLEM — M1A.00X0 CHRONIC GOUTY ARTHRITIS: Status: ACTIVE | Noted: 2020-02-12

## 2022-03-29 PROBLEM — I87.9 DISORDER OF VEIN OF LOWER EXTREMITY: Status: RESOLVED | Noted: 2020-08-21 | Resolved: 2022-03-29

## 2022-03-29 PROCEDURE — 99215 OFFICE O/P EST HI 40 MIN: CPT | Performed by: FAMILY MEDICINE

## 2022-03-29 RX ORDER — HYDROCODONE BITARTRATE AND ACETAMINOPHEN 10; 325 MG/1; MG/1
1 TABLET ORAL EVERY 6 HOURS PRN
Qty: 120 TABLET | Refills: 0 | Status: SHIPPED | OUTPATIENT
Start: 2022-04-10 | End: 2022-05-15 | Stop reason: SDUPTHER

## 2022-03-29 RX ORDER — ZINC GLUCONATE 50 MG
TABLET ORAL
COMMUNITY

## 2022-03-29 RX ORDER — LANOLIN ALCOHOL/MO/W.PET/CERES
1000 CREAM (GRAM) TOPICAL DAILY
COMMUNITY

## 2022-03-29 RX ORDER — MULTIVIT WITH MINERALS/LUTEIN
1000 TABLET ORAL DAILY
COMMUNITY

## 2022-03-29 NOTE — PROGRESS NOTES
Chief Complaint  Back Pain (DDD LUMBAR SPINAL STENOSIS MED REFILL CHRONIC PAIN), Anxiety, and Osteoarthritis (Hospital follow-up after femur fracture)     HOSPITAL FOLLOW-UP  And  3-month follow-up for lumbar spinal stenosis, CKD, FRAN with depression, and request for prostate cancer screening/recurrent    Hospital follow-up--[facility]--12/24/2021 thru 12/28/2021  Dx -- Left distal femur fracture, S/P operative repair.  Apparently, he sustained an injury while getting into his SUV and presented to [facility] with persistent pain.  He was found to have a fracture of his left distal femur and underwent operative periprosthetic repair.  No hospital complications.  No repeat labs were done prior to discharge.  Plans were for inpatient subacute rehab; however, he declined due to his wife's poor health/carrillo with stage IV cancer.  He subsequently went home and had home PT/home health.  Rehab is going well.  Just had follow-up with his orthopedic doctor yesterday, routine healing.    LOV with me in mid December for chronic med refills, uncontrolled FRAN/depression, and new onset intention tremor.  --Cymbalta was increased from 30 mg to 40 mg daily to help with some uncontrolled anxiety/depression.  Recent stressors with wife's poor health/new diagnosis of cancer.  However, he was not able to start the Cymbalta 40 mg due to insurance/cost.  Intolerant to 60 mg in past/side effects.    Currently, things have still been a struggle.  His wife/Melinda (patient of mine for many years) has been in and out of the hospital with complications from chemo treatment.  She was gravely ill in January with sepsis, they did not think she would make it.  She was only able to tolerate 5 of her 6 chemo treatments.  But, they do suspect her cancer is in remission.  She is back at home now and things are little more stable.  Also, he is very frustrated with [employer]--- told from scheduling (I suspect the hub) that he and his wife could  "no longer be seen together/their appointments with me on the same day??  It is very difficult for them to get here due to their impaired vision, immobility, and living over 30 miles away.  They have to rely on family for transportation and [facility] [employer] services.  Today, Kike is being seen.  And, next week his wife Melinda is being seen.    Otherwise, no complaints today.  Just needs chronic med refills and due for follow-up labs.  Also, he is requesting for his PSA to be checked.  He does have a remote history of prostate cancer, previously seen urologist many years ago.  Uncertain when his last PSA was done?  Compliant with and tolerates all medications without side effects.  No longer taking Mobic.  Intolerant to Cymbalta 60 mg.  He has never been tried on other anxiolytics or antidepressants in past.  Declines any other medication at this time.    Review of Systems   Constitutional: Negative for fever and unexpected weight change.   Respiratory: Negative for cough and shortness of breath.    Cardiovascular: Negative for chest pain.        Subjective          Kike Rubio presents to [facility]    Objective   Vital Signs:   Vitals:    03/29/22 1112   BP: 110/60   BP Location: Left arm   Patient Position: Sitting   Cuff Size: Adult   Pulse: 80   Temp: 98.4 °F (36.9 °C)   SpO2: 97%   Weight: 88.5 kg (195 lb 3.2 oz)   Height: 167.6 cm (66\")      Physical Exam  Vitals and nursing note reviewed.   Constitutional:       Appearance: Normal appearance. He is well-developed. He is obese.   HENT:      Head: Normocephalic and atraumatic.      Nose: Nose normal.   Eyes:      Conjunctiva/sclera: Conjunctivae normal.      Pupils: Pupils are equal, round, and reactive to light.   Neck:      Thyroid: No thyromegaly.   Cardiovascular:      Rate and Rhythm: Normal rate and regular rhythm.      Heart sounds: Normal heart sounds. No murmur heard.     Comments: Left lower extremity--- chronic/stable venous " stasis changes only with trace to 1+ edema  Pulmonary:      Effort: Pulmonary effort is normal.      Breath sounds: Normal breath sounds.   Abdominal:      General: Abdomen is flat. Bowel sounds are normal. There is no distension.      Palpations: Abdomen is soft. There is no hepatomegaly, splenomegaly or mass.      Tenderness: There is no abdominal tenderness. There is no guarding or rebound.      Hernia: No hernia is present.   Musculoskeletal:         General: Normal range of motion.      Cervical back: Normal range of motion and neck supple.      Right lower leg: No edema.      Left lower le+ Edema present.   Lymphadenopathy:      Cervical: No cervical adenopathy.   Skin:     General: Skin is warm.   Neurological:      General: No focal deficit present.      Mental Status: He is alert.   Psychiatric:         Mood and Affect: Mood normal.         Behavior: Behavior normal.         Thought Content: Thought content normal.         Judgment: Judgment normal.        Result Review :     Common labs    Common Labsle 9/15/21 9/15/21 9/15/21 3/29/22    1542 1542 1542    Glucose  121 (A)     BUN  36 (A)     Creatinine  1.60 (A)     eGFR Non  Am  42 (A)     eGFR  Am  51 (A)     Sodium  138     Potassium  4.6     Chloride  101     Calcium  9.7     Total Protein  6.7     Albumin  4.30     Total Bilirubin  0.3     Alkaline Phosphatase  106     AST (SGOT)  28     ALT (SGPT)  19     WBC 5.40      Hemoglobin 13.6      Hematocrit 40.2      Platelets 178      Total Cholesterol   188    Triglycerides   157 (A)    HDL Cholesterol   55    LDL Cholesterol    106 (A)    PSA    <0.1   (A) Abnormal value       Comments are available for some flowsheets but are not being displayed.               Lab Results   Component Value Date    VJRA12BP 61.8 09/15/2021    FOLATE >20.0 2020 ----hospital records/labs reviewed from [facility] --creatinine 1.67, CBC WNL except for mild anemia    Medical  records from [place] reviewed --2019--last PSA <0.1             Assessment and Plan    Diagnoses and all orders for this visit:    1. Hospital discharge follow-up (Primary)  -S/P recent left distal femur fracture  Hospital records reviewed.  No complications.    2. Closed fracture of distal end of left femur with routine healing, unspecified fracture morphology, subsequent encounter  -status post repair  Per Ortho, all stable.    3. FRAN (generalized anxiety disorder)  -slightly uncontrolled, due to stressors/wife with cancer  Declines a medication change or adding another medication at this time.  See HPI above.  For now, plan to continue Cymbalta 30 mg daily.  Will refill upon request    4. Mild episode of recurrent major depressive disorder (HCC)  -slightly uncontrolled, due to wife with cancer  Same treatment plan as above.  Declines adding other medication at this time.  Continue Cymbalta 30 mg daily  May possibly consider adding an SSRI or Wellbutrin in future?    5. Hip arthrosis  -controlled  Time to update yearly Urine Tox screen for compliance, very low risk patient behavior.  Kunal report reviewed, appropriate.  No change with medication.  Intolerant to NSAIDs due to history of CKD  Continue/refill Norco as directed below, next refill due on 4/10/2022  -     HYDROcodone-acetaminophen (NORCO)  MG per tablet; Take 1 tablet by mouth Every 6 (Six) Hours As Needed for Moderate Pain .  Dispense: 120 tablet; Refill: 0    6. Degenerative lumbar spinal stenosis  -stable.  Same plan as above    7. Mixed hyperlipidemia  -controlled  Due to recheck lipids and CMP  Goal LDL needs remain less than 130 given history of HTN.  If at goal then plan to continue Crestor 20 mg daily, will refill upon request  Needs low-carb/low calorie/low cholesterol diet and increase cardio exercise to greater than 150 minutes weekly  -     Lipid Panel With LDL / HDL Ratio    8. Primary hypertension  -controlled  Continue HCTZ and  Aldactone as prescribed, will refill upon request  Recheck electrolytes, renal function  -     Comprehensive Metabolic Panel    9. Stage 3a chronic kidney disease (HCC)  -stable  Recheck renal function, CBC.  Remain off all NSAIDs  -     Comprehensive Metabolic Panel  -     CBC & Differential    10. Vitamin D deficiency  -due to recheck vitamin D, further recommendations to follow  -     Vitamin D 1,25 Dihydroxy    11. Prostate cancer (HCC)  -per patient request, recheck PSA  Remote history of prostate cancer, no longer sees urologist.  -     Cancel: PSA SCREENING; Future  -     PSA Screen    12. Encounter for screening for malignant neoplasm of prostate   -     Cancel: PSA SCREENING; Future  -     PSA Screen    13. High risk medication use  -     ToxASSURE Select 13 Discrete -      I spent 45 minutes caring for Kike on this date of service. This time includes time spent by me in the following activities:preparing for the visit, reviewing tests, obtaining and/or reviewing a separately obtained history, performing a medically appropriate examination and/or evaluation , counseling and educating the patient/family/caregiver, ordering medications, tests, or procedures, documenting information in the medical record, independently interpreting results and communicating that information with the patient/family/caregiver and Reviewed recent hospital discharge summary/labs, reviewed labs from 2019, and counseled/discussed in great depth current grief/stressors and frustrations with this new practice/[employer] etc.  Follow Up   Return in about 3 months (around 6/29/2022) for Recheck, 30-minute patient please.  Patient was given instructions and counseling regarding his condition or for health maintenance advice. Please see specific information pulled into the AVS if appropriate.

## 2022-03-30 LAB — PSA SERPL-MCNC: <0.1 NG/ML (ref 0–4)

## 2022-04-05 LAB
1,25(OH)2D SERPL-MCNC: 35 PG/ML (ref 19.9–79.3)
6MAM UR QL CFM: NEGATIVE
6MAM/CREAT UR: NOT DETECTED NG/MG CREAT
7AMINOCLONAZEPAM/CREAT UR: NOT DETECTED NG/MG CREAT
A-OH ALPRAZ/CREAT UR: NOT DETECTED NG/MG CREAT
A-OH-TRIAZOLAM/CREAT UR CFM: NOT DETECTED NG/MG CREAT
ALBUMIN SERPL-MCNC: 4.2 G/DL (ref 3.6–4.6)
ALBUMIN/GLOB SERPL: 1.4 {RATIO} (ref 1.2–2.2)
ALFENTANIL/CREAT UR CFM: NOT DETECTED NG/MG CREAT
ALP SERPL-CCNC: 158 IU/L (ref 44–121)
ALPHA-HYDROXYMIDAZOLAM, URINE: NOT DETECTED NG/MG CREAT
ALPRAZ/CREAT UR CFM: NOT DETECTED NG/MG CREAT
ALT SERPL-CCNC: 13 IU/L (ref 0–44)
AMOBARBITAL UR QL CFM: NOT DETECTED
AMPHET/CREAT UR: NOT DETECTED NG/MG CREAT
AMPHETAMINES UR QL CFM: NEGATIVE
AST SERPL-CCNC: 23 IU/L (ref 0–40)
BARBITAL UR QL CFM: NOT DETECTED
BARBITURATES UR QL CFM: NEGATIVE
BASOPHILS # BLD AUTO: 0 X10E3/UL (ref 0–0.2)
BASOPHILS NFR BLD AUTO: 1 %
BENZODIAZ UR QL CFM: NEGATIVE
BILIRUB SERPL-MCNC: 0.3 MG/DL (ref 0–1.2)
BUN SERPL-MCNC: 22 MG/DL (ref 8–27)
BUN/CREAT SERPL: 14 (ref 10–24)
BUPRENORPHINE UR QL CFM: NEGATIVE
BUPRENORPHINE/CREAT UR: NOT DETECTED NG/MG CREAT
BUTABARBITAL UR QL CFM: NOT DETECTED
BUTALBITAL UR QL CFM: NOT DETECTED
BZE/CREAT UR: NOT DETECTED NG/MG CREAT
CALCIUM SERPL-MCNC: 9.7 MG/DL (ref 8.6–10.2)
CANNABINOIDS UR QL CFM: NEGATIVE
CARBOXYTHC/CREAT UR: NOT DETECTED NG/MG CREAT
CHLORIDE SERPL-SCNC: 99 MMOL/L (ref 96–106)
CHOLEST SERPL-MCNC: 155 MG/DL (ref 100–199)
CLONAZEPAM/CREAT UR CFM: NOT DETECTED NG/MG CREAT
CO2 SERPL-SCNC: 24 MMOL/L (ref 20–29)
COCAETHYLENE/CREAT UR CFM: NOT DETECTED NG/MG CREAT
COCAINE UR QL CFM: NEGATIVE
COCAINE/CREAT UR CFM: NOT DETECTED NG/MG CREAT
CODEINE/CREAT UR: NOT DETECTED NG/MG CREAT
CREAT SERPL-MCNC: 1.59 MG/DL (ref 0.76–1.27)
CREAT UR-MCNC: 90 MG/DL
DESALKYLFLURAZ/CREAT UR: NOT DETECTED NG/MG CREAT
DESMETHYLFLUNITRAZEPAM: NOT DETECTED NG/MG CREAT
DHC/CREAT UR: 1030 NG/MG CREAT
DIAZEPAM/CREAT UR: NOT DETECTED NG/MG CREAT
DRUGS UR: NORMAL
EDDP/CREAT UR: NOT DETECTED NG/MG CREAT
EGFRCR SERPLBLD CKD-EPI 2021: 43 ML/MIN/1.73
EOSINOPHIL # BLD AUTO: 0.2 X10E3/UL (ref 0–0.4)
EOSINOPHIL NFR BLD AUTO: 3 %
ERYTHROCYTE [DISTWIDTH] IN BLOOD BY AUTOMATED COUNT: 12.5 % (ref 11.6–15.4)
ETHANOL UR CFM-MCNC: NOT DETECTED G/DL
ETHANOL UR QL CFM: NEGATIVE
FENTANYL UR QL CFM: NEGATIVE
FENTANYL/CREAT UR: NOT DETECTED NG/MG CREAT
FLUNITRAZEPAM UR QL CFM: NOT DETECTED NG/MG CREAT
GLOBULIN SER CALC-MCNC: 2.9 G/DL (ref 1.5–4.5)
GLUCOSE SERPL-MCNC: 83 MG/DL (ref 65–99)
HCT VFR BLD AUTO: 39.9 % (ref 37.5–51)
HDLC SERPL-MCNC: 54 MG/DL
HGB BLD-MCNC: 13.4 G/DL (ref 13–17.7)
HYDROCODONE/CREAT UR: 2921 NG/MG CREAT
HYDROMORPHONE/CREAT UR: 1090 NG/MG CREAT
IMM GRANULOCYTES # BLD AUTO: 0 X10E3/UL (ref 0–0.1)
IMM GRANULOCYTES NFR BLD AUTO: 0 %
LDLC SERPL CALC-MCNC: 80 MG/DL (ref 0–99)
LDLC/HDLC SERPL: 1.5 RATIO (ref 0–3.6)
LEVEL OF DETECTION:: NORMAL
LORAZEPAM/CREAT UR: NOT DETECTED NG/MG CREAT
LYMPHOCYTES # BLD AUTO: 1.4 X10E3/UL (ref 0.7–3.1)
LYMPHOCYTES NFR BLD AUTO: 22 %
MCH RBC QN AUTO: 33.3 PG (ref 26.6–33)
MCHC RBC AUTO-ENTMCNC: 33.6 G/DL (ref 31.5–35.7)
MCV RBC AUTO: 99 FL (ref 79–97)
MDA/CREAT UR: NOT DETECTED NG/MG CREAT
MDMA/CREAT UR: NOT DETECTED NG/MG CREAT
MEPHOBARBITAL UR QL CFM: NOT DETECTED
METHADONE UR QL CFM: NEGATIVE
METHADONE/CREAT UR: NOT DETECTED NG/MG CREAT
METHAMPHET/CREAT UR: NOT DETECTED NG/MG CREAT
MIDAZOLAM/CREAT UR CFM: NOT DETECTED NG/MG CREAT
MONOCYTES # BLD AUTO: 0.8 X10E3/UL (ref 0.1–0.9)
MONOCYTES NFR BLD AUTO: 13 %
MORPHINE/CREAT UR: NOT DETECTED NG/MG CREAT
N-NORTRAMADOL/CREAT UR CFM: NOT DETECTED NG/MG CREAT
NARCOTICS UR: NEGATIVE
NEUTROPHILS # BLD AUTO: 3.8 X10E3/UL (ref 1.4–7)
NEUTROPHILS NFR BLD AUTO: 61 %
NORBUPRENORPHINE/CREAT UR: NOT DETECTED NG/MG CREAT
NORCODEINE/CREAT UR CFM: NOT DETECTED NG/MG CREAT
NORDIAZEPAM/CREAT UR: NOT DETECTED NG/MG CREAT
NORFENTANYL/CREAT UR: NOT DETECTED NG/MG CREAT
NORHYDROCODONE/CREAT UR: 3510 NG/MG CREAT
NORMORPHINE UR-MCNC: NOT DETECTED NG/MG CREAT
NOROXYCODONE/CREAT UR: NOT DETECTED NG/MG CREAT
NOROXYMORPHONE/CREAT UR CFM: NOT DETECTED NG/MG CREAT
O-NORTRAMADOL UR CFM-MCNC: NOT DETECTED NG/MG CREAT
OPIATES UR QL CFM: NORMAL
OXAZEPAM/CREAT UR: NOT DETECTED NG/MG CREAT
OXYCODONE UR QL CFM: NEGATIVE
OXYCODONE/CREAT UR: NOT DETECTED NG/MG CREAT
OXYMORPHONE/CREAT UR: NOT DETECTED NG/MG CREAT
PENTOBARB UR QL CFM: NOT DETECTED
PHENOBARB UR QL CFM: NOT DETECTED
PLATELET # BLD AUTO: 223 X10E3/UL (ref 150–450)
POTASSIUM SERPL-SCNC: 5 MMOL/L (ref 3.5–5.2)
PROT SERPL-MCNC: 7.1 G/DL (ref 6–8.5)
RBC # BLD AUTO: 4.03 X10E6/UL (ref 4.14–5.8)
SECOBARBITAL UR QL CFM: NOT DETECTED
SODIUM SERPL-SCNC: 140 MMOL/L (ref 134–144)
SUFENTANIL/CREAT UR CFM: NOT DETECTED NG/MG CREAT
TAPENTADOL UR QL CFM: NEGATIVE
TAPENTADOL/CREAT UR: NOT DETECTED NG/MG CREAT
TEMAZEPAM/CREAT UR: NOT DETECTED NG/MG CREAT
THIOPENTAL UR QL CFM: NOT DETECTED
TRAMADOL UR QL CFM: NOT DETECTED NG/MG CREAT
TRIGL SERPL-MCNC: 115 MG/DL (ref 0–149)
VLDLC SERPL CALC-MCNC: 21 MG/DL (ref 5–40)
WBC # BLD AUTO: 6.2 X10E3/UL (ref 3.4–10.8)

## 2022-05-15 DIAGNOSIS — M16.9 HIP ARTHROSIS: ICD-10-CM

## 2022-05-19 RX ORDER — HYDROCODONE BITARTRATE AND ACETAMINOPHEN 10; 325 MG/1; MG/1
1 TABLET ORAL EVERY 6 HOURS PRN
Qty: 120 TABLET | Refills: 0 | Status: SHIPPED | OUTPATIENT
Start: 2022-05-22 | End: 2022-06-23 | Stop reason: SDUPTHER

## 2022-06-23 DIAGNOSIS — M16.9 HIP ARTHROSIS: ICD-10-CM

## 2022-06-24 RX ORDER — HYDROCODONE BITARTRATE AND ACETAMINOPHEN 10; 325 MG/1; MG/1
1 TABLET ORAL EVERY 6 HOURS PRN
Qty: 120 TABLET | Refills: 0 | Status: SHIPPED | OUTPATIENT
Start: 2022-06-24 | End: 2022-07-05 | Stop reason: SDUPTHER

## 2022-06-24 NOTE — TELEPHONE ENCOUNTER
Rx Refill Note  Requested Prescriptions     Pending Prescriptions Disp Refills   • HYDROcodone-acetaminophen (NORCO)  MG per tablet 120 tablet 0     Sig: Take 1 tablet by mouth Every 6 (Six) Hours As Needed for Moderate Pain .      Last office visit with prescribing clinician: 3/29/2022      Next office visit with prescribing clinician: 7/5/2022            Angelita Lewis MA  06/24/22, 10:09 EDT

## 2022-07-01 ENCOUNTER — TELEPHONE (OUTPATIENT)
Dept: FAMILY MEDICINE CLINIC | Facility: CLINIC | Age: 82
End: 2022-07-01

## 2022-07-01 NOTE — TELEPHONE ENCOUNTER
Left message for patient and spouse, they both have appointments on 7/5. Their provider wanted to express that she may not be in the office on the date of appointment. Advised patients that they will be updated by 8 am on 7/5.

## 2022-07-05 ENCOUNTER — OFFICE VISIT (OUTPATIENT)
Dept: FAMILY MEDICINE CLINIC | Facility: CLINIC | Age: 82
End: 2022-07-05

## 2022-07-05 VITALS
SYSTOLIC BLOOD PRESSURE: 128 MMHG | HEIGHT: 66 IN | BODY MASS INDEX: 31.79 KG/M2 | WEIGHT: 197.8 LBS | DIASTOLIC BLOOD PRESSURE: 66 MMHG | OXYGEN SATURATION: 99 % | HEART RATE: 76 BPM | TEMPERATURE: 98 F

## 2022-07-05 DIAGNOSIS — S81.812D LACERATION OF LEFT LOWER EXTREMITY, SUBSEQUENT ENCOUNTER: ICD-10-CM

## 2022-07-05 DIAGNOSIS — L03.116 CELLULITIS OF LEFT LOWER EXTREMITY: ICD-10-CM

## 2022-07-05 DIAGNOSIS — F33.0 MILD EPISODE OF RECURRENT MAJOR DEPRESSIVE DISORDER: ICD-10-CM

## 2022-07-05 DIAGNOSIS — M48.061 DEGENERATIVE LUMBAR SPINAL STENOSIS: ICD-10-CM

## 2022-07-05 DIAGNOSIS — F41.1 GAD (GENERALIZED ANXIETY DISORDER): ICD-10-CM

## 2022-07-05 DIAGNOSIS — M1A.00X0 CHRONIC GOUTY ARTHRITIS: ICD-10-CM

## 2022-07-05 DIAGNOSIS — M16.9 HIP ARTHROSIS: Primary | ICD-10-CM

## 2022-07-05 DIAGNOSIS — Z48.02 ENCOUNTER FOR REMOVAL OF SUTURES: ICD-10-CM

## 2022-07-05 DIAGNOSIS — N18.31 STAGE 3A CHRONIC KIDNEY DISEASE: ICD-10-CM

## 2022-07-05 PROBLEM — S81.812A LACERATION OF LEFT LOWER EXTREMITY: Status: ACTIVE | Noted: 2022-07-05

## 2022-07-05 PROBLEM — M1A.09X0 CHRONIC IDIOPATHIC GOUT OF MULTIPLE SITES: Status: RESOLVED | Noted: 2020-02-12 | Resolved: 2022-07-05

## 2022-07-05 PROBLEM — K21.9 GERD (GASTROESOPHAGEAL REFLUX DISEASE): Status: RESOLVED | Noted: 2020-02-12 | Resolved: 2022-07-05

## 2022-07-05 PROCEDURE — 99214 OFFICE O/P EST MOD 30 MIN: CPT | Performed by: FAMILY MEDICINE

## 2022-07-05 RX ORDER — HYDROCODONE BITARTRATE AND ACETAMINOPHEN 10; 325 MG/1; MG/1
1 TABLET ORAL EVERY 6 HOURS PRN
Qty: 120 TABLET | Refills: 0 | Status: SHIPPED | OUTPATIENT
Start: 2022-07-26 | End: 2022-07-26 | Stop reason: SDUPTHER

## 2022-07-05 RX ORDER — ALLOPURINOL 100 MG/1
100 TABLET ORAL DAILY
Qty: 90 TABLET | Refills: 1 | Status: SHIPPED | OUTPATIENT
Start: 2022-07-05 | End: 2023-02-15

## 2022-07-05 NOTE — PROGRESS NOTES
"Chief Complaint  Osteoarthritis (Med refills patient is not fasting), Hyperlipidemia, lumbar spinal stenosis, Anxiety, and Depression     3-month follow-up for chronic OA, CKD, lumbar spinal stenosis, FRAN, depression, and recent urgent care visit for left lower extremity laceration and cellulitis    LOV with me in March after hospitalization for femur fracture and chronic med refills with labs.  -- Routine labs updated, all stable no med changes made.  -- Discussed some uncontrolled FRAN with depression, situational.  Yet, declined changing medications or making adjustments.    Overall, doing much better.  Recently celebrated his 60th wedding anniversary with his wife and family in [place].  Some good reports/PET scan regarding his wife's cancer as it is now in remission.  However, she is still seeing multiple specialist and having chronic problems.  He states \"he would just like to have his wife back.\"  Although sleeping better.  Mood has improved.  Weight stable.    Other interval history --seen in the urgent care center on 6/9/2022.  [facility], records reviewed.  Dx --left lower extremity laceration with suture repair, cellulitis.  Apparently, he accidentally slammed his left leg in the car door and sustained a laceration.  He was seen and treated at the urgent care center.  5 sutures were placed, wound care, and Tdap vaccine was given.  He was also started on Keflex x1 week for some mild cellulitis.  He was told to follow-up to have sutures removed in 10 days.  However, his daughter (previous paramedic) was able to remove 3 out of the 5 sutures.  He says the wound has improved, edema under control.  No further warmth, looks \"pretty good to him.\"    Otherwise, just needs chronic med refills.  Compliant with and tolerates all medications without side effects.  Still taking Norco 10 mg a quantity of 4/day for his significant bilateral hip OA and lumbar spinal stenosis.  Intolerant to NSAIDs due to history of " "CKD.  No recent gouty attacks.    Review of Systems   Constitutional: Negative for fever and unexpected weight change.   Respiratory: Negative for cough and shortness of breath.    Cardiovascular: Negative for chest pain.        Subjective          Kike Rubio presents to [facility]    Objective   Vital Signs:   Vitals:    07/05/22 1047   BP: 128/66   BP Location: Left arm   Patient Position: Sitting   Cuff Size: Adult   Pulse: 76   Temp: 98 °F (36.7 °C)   SpO2: 99%   Weight: 89.7 kg (197 lb 12.8 oz)   Height: 167.6 cm (66\")      Physical Exam  Vitals and nursing note reviewed.   Constitutional:       Appearance: Normal appearance. He is well-developed.   HENT:      Head: Normocephalic and atraumatic.      Nose: Nose normal.   Eyes:      Conjunctiva/sclera: Conjunctivae normal.      Pupils: Pupils are equal, round, and reactive to light.   Neck:      Thyroid: No thyromegaly.   Cardiovascular:      Rate and Rhythm: Normal rate and regular rhythm.      Heart sounds: Normal heart sounds. No murmur heard.  Pulmonary:      Effort: Pulmonary effort is normal.      Breath sounds: Normal breath sounds.   Abdominal:      General: Abdomen is flat. Bowel sounds are normal. There is no distension.      Palpations: Abdomen is soft. There is no hepatomegaly, splenomegaly or mass.      Tenderness: There is no abdominal tenderness. There is no guarding or rebound.      Hernia: No hernia is present.   Musculoskeletal:         General: Normal range of motion.      Cervical back: Normal range of motion and neck supple.      Right lower leg: No edema.      Left lower leg: Edema present.      Comments: Left lower extremity --with chronic venous stasis changes, trace to 1+ edema/chronic, brawny erythematous appearance/chronic, no warmth    With approximately a 4 cm healing wound, half of which has black eschar scar.  Still with 1 suture noted.  This was cleansed by me and removed today.  Good hemostasis. "   Lymphadenopathy:      Cervical: No cervical adenopathy.   Skin:     General: Skin is warm.   Neurological:      General: No focal deficit present.      Mental Status: He is alert.   Psychiatric:         Mood and Affect: Mood normal.         Behavior: Behavior normal.         Thought Content: Thought content normal.         Judgment: Judgment normal.        Result Review :     Common labs    Common Labsle 9/15/21 9/15/21 9/15/21 3/29/22 3/29/22 3/29/22 3/29/22    1542 1542 1542 1215 1215 1215 1220   Glucose  121 (A)  83      BUN  36 (A)  22      Creatinine  1.60 (A)  1.59 (A)      eGFR Non  Am  42 (A)        eGFR  Am  51 (A)        Sodium  138  140      Potassium  4.6  5.0      Chloride  101  99      Calcium  9.7  9.7      Total Protein  6.7  7.1      Albumin  4.30  4.2      Total Bilirubin  0.3  0.3      Alkaline Phosphatase  106  158 (A)      AST (SGOT)  28  23      ALT (SGPT)  19  13      WBC 5.40    6.2     Hemoglobin 13.6    13.4     Hematocrit 40.2    39.9     Platelets 178    223     Total Cholesterol   188   155    Triglycerides   157 (A)   115    HDL Cholesterol   55   54    LDL Cholesterol    106 (A)   80    PSA       <0.1   (A) Abnormal value       Comments are available for some flowsheets but are not being displayed.               PSA    PSA 3/29/22   PSA <0.1      Comments are available for some flowsheets but are not being displayed.               Lab Results   Component Value Date    MFSW69SH 61.8 09/15/2021    FOLATE >20.0 02/12/2020        Suture Removal    Date/Time: 7/5/2022 12:36 PM  Performed by: Kina Morin MD  Authorized by: Kina Morin MD   Consent: Verbal consent obtained.  Consent given by: patient  Patient understanding: patient states understanding of the procedure being performed  Body area: lower extremity  Location details: left lower leg  Wound Appearance: clean  Sutures Removed: 1  Post-removal: dressing applied  Patient tolerance: patient tolerated the  procedure well with no immediate complications            Assessment and Plan    Diagnoses and all orders for this visit:    1. Hip arthrosis (Primary)  -controlled  Urine Tox screen/March and Kunal report both reviewed, appropriate.  No change with medication.  Intolerant to NSAIDs due to history of CKD.  Refill Norco as directed below, next refill due on 7/26/2022  -     HYDROcodone-acetaminophen (NORCO)  MG per tablet; Take 1 tablet by mouth Every 6 (Six) Hours As Needed for Moderate Pain .  Dispense: 120 tablet; Refill: 0    2. Degenerative lumbar spinal stenosis  -stable.  Same plan as above    3. Stage 3a chronic kidney disease (HCC)  -stable.  Labs up-to-date, stable.  Continue to avoid NSAIDs    4. Chronic gouty arthritis  -controlled  Refill allopurinol as directed below.  Renal function stable    5. FRAN (generalized anxiety disorder)  -controlled  No change with medication.  Continue Cymbalta 30 mg daily, will refill upon request    6. Mild episode of recurrent major depressive disorder (HCC)  -stable.  Continue Cymbalta 30 mg daily    7. Laceration of left lower extremity, subsequent encounter  -new problem.  S/P 3-week follow-up for urgent care visit.  Records reviewed.  S/P laceration repair with 5 sutures placed.  Apparently, only 3 of the 5 sutures were removed.  Only able to find 1 additional suture today due to eschar tissue.  S/P removal of 1 suture without complication.  Needs to keep wound clean and dry.      8. Cellulitis of left lower extremity  -S/P urgent care visit 3 weeks ago.  S/P Keflex x1 week.  Resolved, at chronic venous stasis changes currently.  Hold on any further antibiotics    9. Encounter for removal of sutures  -status post suture removal x1.    Other orders  -     allopurinol (ZYLOPRIM) 100 MG tablet; Take 1 tablet by mouth Daily.  Dispense: 90 tablet; Refill: 1        Follow Up   Return in about 3 months (around 10/5/2022) for Medicare Wellness, Recheck.  Patient was  given instructions and counseling regarding his condition or for health maintenance advice. Please see specific information pulled into the AVS if appropriate.

## 2022-07-26 DIAGNOSIS — M16.9 HIP ARTHROSIS: ICD-10-CM

## 2022-07-26 NOTE — TELEPHONE ENCOUNTER
Rx Refill Note  Requested Prescriptions     Pending Prescriptions Disp Refills   • HYDROcodone-acetaminophen (NORCO)  MG per tablet 120 tablet 0     Sig: Take 1 tablet by mouth Every 6 (Six) Hours As Needed for Moderate Pain .      Last office visit with prescribing clinician: 7/5/2022      Next office visit with prescribing clinician: 10/18/2022            Donte Ramires MA  07/26/22, 12:32 EDT

## 2022-07-28 RX ORDER — HYDROCODONE BITARTRATE AND ACETAMINOPHEN 10; 325 MG/1; MG/1
1 TABLET ORAL EVERY 6 HOURS PRN
Qty: 120 TABLET | Refills: 0 | Status: SHIPPED | OUTPATIENT
Start: 2022-07-28 | End: 2022-08-27 | Stop reason: SDUPTHER

## 2022-08-27 DIAGNOSIS — M16.9 HIP ARTHROSIS: ICD-10-CM

## 2022-08-29 NOTE — TELEPHONE ENCOUNTER
Rx Refill Note  Requested Prescriptions     Pending Prescriptions Disp Refills   • HYDROcodone-acetaminophen (NORCO)  MG per tablet 120 tablet 0     Sig: Take 1 tablet by mouth Every 6 (Six) Hours As Needed for Moderate Pain .      Last office visit with prescribing clinician: 7/5/2022      Next office visit with prescribing clinician: 10/18/2022            Donte Ramires MA  08/29/22, 07:52 EDT

## 2022-08-30 RX ORDER — HYDROCODONE BITARTRATE AND ACETAMINOPHEN 10; 325 MG/1; MG/1
1 TABLET ORAL EVERY 6 HOURS PRN
Qty: 120 TABLET | Refills: 0 | Status: SHIPPED | OUTPATIENT
Start: 2022-08-30 | End: 2022-09-26 | Stop reason: SDUPTHER

## 2022-09-14 ENCOUNTER — TELEPHONE (OUTPATIENT)
Dept: FAMILY MEDICINE CLINIC | Facility: CLINIC | Age: 82
End: 2022-09-14

## 2022-09-14 NOTE — TELEPHONE ENCOUNTER
Caller: DIPIKA [facility]     Relationship: Other    Best call back number:[phone]      What is the best time to reach you: ANYTIME     Who are you requesting to speak with (clinical staff, provider,  specific staff member): YES       What was the call regarding: WILLIAM IS CALLING TO CHECK ON A FAX REQUEST FOR A REFILL ON THE PATIENT'S C-PAP SUPPLIES. WILLIAM WOULD LIKE FOR SOMEONE TO FILL OUT THIS PAPERWORK AND FAX IT BACK TO HIM    PLEASE ADVISE     Do you require a callback:YES

## 2022-09-26 DIAGNOSIS — M16.9 HIP ARTHROSIS: ICD-10-CM

## 2022-09-27 NOTE — TELEPHONE ENCOUNTER
Rx Refill Note  Requested Prescriptions     Pending Prescriptions Disp Refills   • HYDROcodone-acetaminophen (NORCO)  MG per tablet 120 tablet 0     Sig: Take 1 tablet by mouth Every 6 (Six) Hours As Needed for Moderate Pain.      Last office visit with prescribing clinician: 7/5/2022      Next office visit with prescribing clinician: 10/18/2022            Donte Ramires MA  09/27/22, 08:45 EDT

## 2022-09-28 RX ORDER — HYDROCODONE BITARTRATE AND ACETAMINOPHEN 10; 325 MG/1; MG/1
1 TABLET ORAL EVERY 6 HOURS PRN
Qty: 120 TABLET | Refills: 0 | Status: SHIPPED | OUTPATIENT
Start: 2022-09-28 | End: 2022-10-18 | Stop reason: SDUPTHER

## 2022-10-18 ENCOUNTER — OFFICE VISIT (OUTPATIENT)
Dept: FAMILY MEDICINE CLINIC | Facility: CLINIC | Age: 82
End: 2022-10-18

## 2022-10-18 VITALS
SYSTOLIC BLOOD PRESSURE: 120 MMHG | TEMPERATURE: 97.5 F | DIASTOLIC BLOOD PRESSURE: 68 MMHG | HEART RATE: 96 BPM | OXYGEN SATURATION: 100 % | WEIGHT: 193.6 LBS | HEIGHT: 66 IN | BODY MASS INDEX: 31.12 KG/M2

## 2022-10-18 DIAGNOSIS — I10 PRIMARY HYPERTENSION: ICD-10-CM

## 2022-10-18 DIAGNOSIS — J43.8 OTHER EMPHYSEMA: ICD-10-CM

## 2022-10-18 DIAGNOSIS — R10.13 DYSPEPSIA: ICD-10-CM

## 2022-10-18 DIAGNOSIS — F33.0 MILD EPISODE OF RECURRENT MAJOR DEPRESSIVE DISORDER: ICD-10-CM

## 2022-10-18 DIAGNOSIS — N18.31 STAGE 3A CHRONIC KIDNEY DISEASE: ICD-10-CM

## 2022-10-18 DIAGNOSIS — M16.9 HIP ARTHROSIS: Primary | ICD-10-CM

## 2022-10-18 DIAGNOSIS — R09.82 POSTNASAL DRIP: ICD-10-CM

## 2022-10-18 DIAGNOSIS — E78.2 MIXED HYPERLIPIDEMIA: ICD-10-CM

## 2022-10-18 PROCEDURE — 99214 OFFICE O/P EST MOD 30 MIN: CPT | Performed by: FAMILY MEDICINE

## 2022-10-18 RX ORDER — HYDROXYZINE HYDROCHLORIDE 10 MG/1
10 TABLET, FILM COATED ORAL 3 TIMES DAILY PRN
Qty: 60 TABLET | Refills: 3 | Status: SHIPPED | OUTPATIENT
Start: 2022-10-18 | End: 2023-01-31

## 2022-10-18 RX ORDER — HYDROCODONE BITARTRATE AND ACETAMINOPHEN 10; 325 MG/1; MG/1
1 TABLET ORAL EVERY 6 HOURS PRN
Qty: 120 TABLET | Refills: 0 | Status: SHIPPED | OUTPATIENT
Start: 2022-10-29 | End: 2022-11-29 | Stop reason: SDUPTHER

## 2022-10-18 RX ORDER — OMEPRAZOLE 40 MG/1
40 CAPSULE, DELAYED RELEASE ORAL DAILY
Qty: 90 CAPSULE | Refills: 1 | Status: SHIPPED | OUTPATIENT
Start: 2022-10-18 | End: 2023-02-02 | Stop reason: SDUPTHER

## 2022-10-18 NOTE — PROGRESS NOTES
"Chief Complaint  Hyperlipidemia (CHECK UP HAS MULTIPLE COMPLAINTS), Nausea (PROBLEMS WITH EATING HAVING NAUSEA), Osteoarthritis (PAIN MED REFILL), Chronic Kidney Disease, Back Pain (DDD LUMBAR SPINAL STENOSIS), and Cough (CONSTANT HOARSE DRY COUGH NP CLEARS THROAT ALL THE TIME)     3-month follow-up for chronic lumbar spinal stenosis and chronic hip OA  And  6-month follow-up for HTN, COPD, hyperlipidemia, and complaints of nausea/decreased appetite and chronic irritating cough    LOV with me in July for chronic med refills and ER follow-up after lower extremity laceration and cellulitis  -- Sutures removed, treated with Keflex.  Cellulitis resolved.  -- No med changes made.    Recently things have not been going so well.  Has multiple complaints today.  Unfortunately, he has been under a great deal of stress as he has been the victim of some type of financial scam for over $10,000.  He has been very frustrated and irritated with trying to deal with the insurance company!  Sleep is fine.  Just having a hard time with his \"nerves\" and irritability.    Complains of increasing problems with nausea over the last 6 to 8 weeks.  Decreased appetite.  No abdominal pain, vomiting, or diarrhea.  Weight loss 4 to 5 pounds noted.  Currently takes omeprazole 20 mg daily for chronic GERD.  Not taking any NSAIDs at this time.   No prior EGD done and actually refuses to have one done anytime soon!  He really believes \"his nerves\" are the reason for his nausea.    Complains of a chronic and irritating nonproductive cough x several months as well.  This has actually been an ongoing thing for quite some time, often getting worse during spring and fall season.  Feels like he is constantly clearing his throat all the time.  Taking a daily antihistamine but not using his Flonase.    Also, concerned about some wax buildup in his ears.  Would like to have these looked at as well.  Recently had right carpal tunnel surgery, Dr. Carrasquillo.  Still " "with soft cast in place.    Otherwise, needs chronic med refills.  Due for 6-month fasting labs.  Compliant with and tolerates all medications without side effects.  Currently taking Cymbalta 30 mg daily for FRAN and his lumbar spinal stenosis/radicular pain.  Intolerant to 60 mg of Cymbalta and 40 mg of Cymbalta was cost prohibited with his insurance!   Also, still takes his hydrocodone 10 mg a quantity of 4/day for significant hip arthrosis and lumbar spinal stenosis.    Review of Systems   Constitutional: Negative for fever and unexpected weight change.   Respiratory: Positive for cough. Negative for shortness of breath.    Cardiovascular: Negative for chest pain.        Subjective          Kike Rubio presents to [facility]    Objective   Vital Signs:   Vitals:    10/18/22 0921   BP: 120/68   BP Location: Left arm   Patient Position: Sitting   Cuff Size: Adult   Pulse: 96   Temp: 97.5 °F (36.4 °C)   SpO2: 100%   Weight: 87.8 kg (193 lb 9.6 oz)   Height: 167.6 cm (66\")      Body mass index is 31.25 kg/m².   Physical Exam  Vitals and nursing note reviewed.   Constitutional:       Appearance: Normal appearance. He is well-developed.   HENT:      Head: Normocephalic and atraumatic.      Comments: Bilateral ears--with minimal, partial flaky cerumen only     Nose: Nose normal.   Eyes:      Conjunctiva/sclera: Conjunctivae normal.      Pupils: Pupils are equal, round, and reactive to light.   Neck:      Thyroid: No thyromegaly.   Cardiovascular:      Rate and Rhythm: Normal rate and regular rhythm.      Heart sounds: Normal heart sounds. No murmur heard.  Pulmonary:      Effort: Pulmonary effort is normal.      Breath sounds: Normal breath sounds.   Abdominal:      General: Abdomen is flat. Bowel sounds are normal. There is no distension.      Palpations: Abdomen is soft. There is no hepatomegaly, splenomegaly or mass.      Tenderness: There is no abdominal tenderness. There is no guarding " "or rebound.      Hernia: No hernia is present.   Musculoskeletal:         General: Normal range of motion.      Cervical back: Normal range of motion and neck supple.      Right lower leg: No edema.      Left lower leg: No edema.   Lymphadenopathy:      Cervical: No cervical adenopathy.   Skin:     General: Skin is warm.   Neurological:      General: No focal deficit present.      Mental Status: He is alert.   Psychiatric:         Mood and Affect: Mood normal.         Behavior: Behavior normal.         Thought Content: Thought content normal.         Judgment: Judgment normal.        Result Review :     Common labs    Common Labs 3/29/22 3/29/22 3/29/22 3/29/22    1215 1215 1215 1220   Glucose 83      BUN 22      Creatinine 1.59 (A)      Sodium 140      Potassium 5.0      Chloride 99      Calcium 9.7      Total Protein 7.1      Albumin 4.2      Total Bilirubin 0.3      Alkaline Phosphatase 158 (A)      AST (SGOT) 23      ALT (SGPT) 13      WBC  6.2     Hemoglobin  13.4     Hematocrit  39.9     Platelets  223     Total Cholesterol   155    Triglycerides   115    HDL Cholesterol   54    LDL Cholesterol    80    PSA    <0.1   (A) Abnormal value       Comments are available for some flowsheets but are not being displayed.                       Lab Results   Component Value Date    PTQD49SQ 61.8 09/15/2021    FOLATE >20.0 02/12/2020               Assessment and Plan    Diagnoses and all orders for this visit:    1. Hip arthrosis (Primary)  -controlled  Urine Tox screen/March and Kunal report both reviewed, appropriate.  No change of medications.  Intolerant to NSAIDs due to CKD.  Continue/refill Norco as directed below, next refill due on 10/29/2022  -     HYDROcodone-acetaminophen (NORCO)  MG per tablet; Take 1 tablet by mouth Every 6 (Six) Hours As Needed for Moderate Pain.  Dispense: 120 tablet; Refill: 0    2. Dyspepsia  -new Dx.  Suspect this may be multifactorial, due to stresses/\"nerves,\" early gastritis, " and postnasal drip?  Possibly could be due to H. Pylori?  Increase omeprazole to 40 mg daily.  Continue to avoid all NSAIDs.  Check CBC and CMP  See treatment plan below regarding nerves/FRAN and postnasal drip  -     CBC & Differential  -     Comprehensive Metabolic Panel    3. Mild episode of recurrent major depressive disorder (HCC)  -uncontrolled  Declines increasing Cymbalta or adding another med at this time?  Would like to consider Wellbutrin?  Agrees to start hydroxyzine 10 mg 1 p.o. every 8 hours as needed anxiety/panic/irritability  Check TSH  -     TSH    4. Postnasal drip  -uncontrolled  Add Flonase 2 sprays each nostril daily.  Continue antihistamine as prescribed.  May need to add Astelin nasal spray?    5. Primary hypertension  -controlled  Plan to continue HCTZ and Aldactone as prescribed, will refill upon request    6. Mixed hyperlipidemia controlled-   Due to recheck lipids, TSH, CMP  Plan to continue Crestor 20 mg daily  -     Comprehensive Metabolic Panel  -     Lipid Panel With LDL / HDL Ratio  -     TSH    7. Stage 3a chronic kidney disease (HCC)  -stable  Recheck renal function, remain off all NSAIDs    8. Other emphysema (HCC)  -stable  Continue both Dulera and Combivent as prescribed    Other orders  -     omeprazole (priLOSEC) 40 MG capsule; Take 1 capsule by mouth Daily.  Dispense: 90 capsule; Refill: 1  -     hydrOXYzine (ATARAX) 10 MG tablet; Take 1 tablet by mouth 3 (Three) Times a Day As Needed for Anxiety.  Dispense: 60 tablet; Refill: 3    May follow-up in 3 months.  However if not better or worse with nausea then needs to be seen sooner.  May need testing for H. Pylori?  Further work-up needed.        Follow Up   Return in about 3 months (around 1/18/2023) for Medicare Wellness.  Patient was given instructions and counseling regarding his condition or for health maintenance advice. Please see specific information pulled into the AVS if appropriate.

## 2022-10-19 LAB
ALBUMIN SERPL-MCNC: 4.1 G/DL (ref 3.5–5.2)
ALBUMIN/GLOB SERPL: 1.7 G/DL
ALP SERPL-CCNC: 119 U/L (ref 39–117)
ALT SERPL-CCNC: 10 U/L (ref 1–41)
AST SERPL-CCNC: 20 U/L (ref 1–40)
BASOPHILS # BLD AUTO: 0.05 10*3/MM3 (ref 0–0.2)
BASOPHILS NFR BLD AUTO: 0.7 % (ref 0–1.5)
BILIRUB SERPL-MCNC: 0.4 MG/DL (ref 0–1.2)
BUN SERPL-MCNC: 24 MG/DL (ref 8–23)
BUN/CREAT SERPL: 13 (ref 7–25)
CALCIUM SERPL-MCNC: 9.8 MG/DL (ref 8.6–10.5)
CHLORIDE SERPL-SCNC: 102 MMOL/L (ref 98–107)
CHOLEST SERPL-MCNC: 166 MG/DL (ref 0–200)
CO2 SERPL-SCNC: 26.9 MMOL/L (ref 22–29)
CREAT SERPL-MCNC: 1.84 MG/DL (ref 0.76–1.27)
EGFRCR SERPLBLD CKD-EPI 2021: 36.4 ML/MIN/1.73
EOSINOPHIL # BLD AUTO: 0.15 10*3/MM3 (ref 0–0.4)
EOSINOPHIL NFR BLD AUTO: 2.2 % (ref 0.3–6.2)
ERYTHROCYTE [DISTWIDTH] IN BLOOD BY AUTOMATED COUNT: 13.1 % (ref 12.3–15.4)
GLOBULIN SER CALC-MCNC: 2.4 GM/DL
GLUCOSE SERPL-MCNC: 93 MG/DL (ref 65–99)
HCT VFR BLD AUTO: 38.5 % (ref 37.5–51)
HDLC SERPL-MCNC: 56 MG/DL (ref 40–60)
HGB BLD-MCNC: 13.2 G/DL (ref 13–17.7)
IMM GRANULOCYTES # BLD AUTO: 0.03 10*3/MM3 (ref 0–0.05)
IMM GRANULOCYTES NFR BLD AUTO: 0.4 % (ref 0–0.5)
LDLC SERPL CALC-MCNC: 92 MG/DL (ref 0–100)
LDLC/HDLC SERPL: 1.61 {RATIO}
LYMPHOCYTES # BLD AUTO: 1.3 10*3/MM3 (ref 0.7–3.1)
LYMPHOCYTES NFR BLD AUTO: 19 % (ref 19.6–45.3)
MCH RBC QN AUTO: 33.8 PG (ref 26.6–33)
MCHC RBC AUTO-ENTMCNC: 34.3 G/DL (ref 31.5–35.7)
MCV RBC AUTO: 98.5 FL (ref 79–97)
MONOCYTES # BLD AUTO: 0.69 10*3/MM3 (ref 0.1–0.9)
MONOCYTES NFR BLD AUTO: 10.1 % (ref 5–12)
NEUTROPHILS # BLD AUTO: 4.62 10*3/MM3 (ref 1.7–7)
NEUTROPHILS NFR BLD AUTO: 67.6 % (ref 42.7–76)
NRBC BLD AUTO-RTO: 0 /100 WBC (ref 0–0.2)
PLATELET # BLD AUTO: 173 10*3/MM3 (ref 140–450)
POTASSIUM SERPL-SCNC: 4.7 MMOL/L (ref 3.5–5.2)
PROT SERPL-MCNC: 6.5 G/DL (ref 6–8.5)
RBC # BLD AUTO: 3.91 10*6/MM3 (ref 4.14–5.8)
SODIUM SERPL-SCNC: 139 MMOL/L (ref 136–145)
TRIGL SERPL-MCNC: 99 MG/DL (ref 0–150)
TSH SERPL DL<=0.005 MIU/L-ACNC: 1.89 UIU/ML (ref 0.27–4.2)
VLDLC SERPL CALC-MCNC: 18 MG/DL (ref 5–40)
WBC # BLD AUTO: 6.84 10*3/MM3 (ref 3.4–10.8)

## 2022-10-19 NOTE — PATIENT INSTRUCTIONS
Increase omeprazole to 40 mg daily    Continue to remain off all NSAIDs    Restart daily Flonase 2 sprays each nostril daily, continue antihistamine    May start Vistaril 10 mg every 8 hours as needed anxiety/panic

## 2022-11-02 DIAGNOSIS — N18.31 STAGE 3A CHRONIC KIDNEY DISEASE: Primary | ICD-10-CM

## 2022-11-29 DIAGNOSIS — M16.9 HIP ARTHROSIS: ICD-10-CM

## 2022-12-02 ENCOUNTER — TELEPHONE (OUTPATIENT)
Dept: FAMILY MEDICINE CLINIC | Facility: CLINIC | Age: 82
End: 2022-12-02

## 2022-12-02 DIAGNOSIS — G56.00 CARPAL TUNNEL SYNDROME, UNSPECIFIED LATERALITY: Primary | ICD-10-CM

## 2022-12-02 RX ORDER — HYDROCODONE BITARTRATE AND ACETAMINOPHEN 10; 325 MG/1; MG/1
1 TABLET ORAL EVERY 6 HOURS PRN
Qty: 120 TABLET | Refills: 0 | Status: SHIPPED | OUTPATIENT
Start: 2022-12-02 | End: 2023-01-03 | Stop reason: SDUPTHER

## 2022-12-02 NOTE — TELEPHONE ENCOUNTER
Caller: Kike Rubio    Relationship: Self    Best call back number: [phone]    What is the best time to reach you: ANYTIME    Who are you requesting to speak with (clinical staff, provider,  specific staff member): LORETTA     Do you know the name of the person who called: PATIENT     What was the call regarding: PATIENT CALLED AND SAID THAT HE WOULD LIKE TO SPEAK TO LORETTA ABOUT A REFERRAL TO [facility] PH[facility] FOLLOWING CARPAL TUNNEL SURGERY IN October     Do you require a callback: YES

## 2023-01-03 DIAGNOSIS — M16.9 HIP ARTHROSIS: ICD-10-CM

## 2023-01-04 NOTE — TELEPHONE ENCOUNTER
Rx Refill Note  Requested Prescriptions     Pending Prescriptions Disp Refills   • HYDROcodone-acetaminophen (NORCO)  MG per tablet 120 tablet 0     Sig: Take 1 tablet by mouth Every 6 (Six) Hours As Needed for Moderate Pain.      Last office visit with prescribing clinician: 10/18/2022   Last telemedicine visit with prescribing clinician: 1/31/2023   Next office visit with prescribing clinician: 1/31/2023                         Would you like a call back once the refill request has been completed: [] Yes [] No    If the office needs to give you a call back, can they leave a voicemail: [] Yes [] No    Donte Ramires MA  01/04/23, 07:36 EST

## 2023-01-05 RX ORDER — HYDROCODONE BITARTRATE AND ACETAMINOPHEN 10; 325 MG/1; MG/1
1 TABLET ORAL EVERY 6 HOURS PRN
Qty: 120 TABLET | Refills: 0 | Status: SHIPPED | OUTPATIENT
Start: 2023-01-05 | End: 2023-01-31 | Stop reason: SDUPTHER

## 2023-01-11 RX ORDER — DULOXETIN HYDROCHLORIDE 30 MG/1
CAPSULE, DELAYED RELEASE ORAL
Qty: 90 CAPSULE | Refills: 3 | Status: SHIPPED | OUTPATIENT
Start: 2023-01-11

## 2023-01-25 ENCOUNTER — TELEPHONE (OUTPATIENT)
Dept: FAMILY MEDICINE CLINIC | Facility: CLINIC | Age: 83
End: 2023-01-25

## 2023-01-25 NOTE — TELEPHONE ENCOUNTER
Caller: [facility] - [place], [place] - [address] [place] [address] [phone] [facility] [phone]     Relationship to patient: Pharmacy    Best call back number: [phone]    Patient is needing: AMAN CALLED FROM [email or web address] TO LET DR. GUILLERMO KNOW THAT THE PRIOR AUTHORIZATION FOR THE PATIENT'S HYDROCODONE WAS APPROVED    THE APPROVAL DATE IS BACKDATED TO 01/01/2023 AND IS GOOD UNTIL 01/25/2024      CASE NUMBER: [id number]

## 2023-01-31 ENCOUNTER — OFFICE VISIT (OUTPATIENT)
Dept: FAMILY MEDICINE CLINIC | Facility: CLINIC | Age: 83
End: 2023-01-31
Payer: MEDICARE

## 2023-01-31 VITALS
TEMPERATURE: 97.5 F | OXYGEN SATURATION: 99 % | DIASTOLIC BLOOD PRESSURE: 78 MMHG | HEART RATE: 74 BPM | BODY MASS INDEX: 30.25 KG/M2 | SYSTOLIC BLOOD PRESSURE: 132 MMHG | HEIGHT: 66 IN | WEIGHT: 188.2 LBS

## 2023-01-31 DIAGNOSIS — I10 PRIMARY HYPERTENSION: ICD-10-CM

## 2023-01-31 DIAGNOSIS — C61 PROSTATE CANCER: ICD-10-CM

## 2023-01-31 DIAGNOSIS — E78.2 MIXED HYPERLIPIDEMIA: ICD-10-CM

## 2023-01-31 DIAGNOSIS — J43.8 OTHER EMPHYSEMA: ICD-10-CM

## 2023-01-31 DIAGNOSIS — F41.1 GAD (GENERALIZED ANXIETY DISORDER): ICD-10-CM

## 2023-01-31 DIAGNOSIS — Z00.00 ENCOUNTER FOR MEDICARE ANNUAL WELLNESS EXAM: Primary | ICD-10-CM

## 2023-01-31 DIAGNOSIS — M16.9 HIP ARTHROSIS: ICD-10-CM

## 2023-01-31 DIAGNOSIS — F32.0 CURRENT MILD EPISODE OF MAJOR DEPRESSIVE DISORDER WITHOUT PRIOR EPISODE: ICD-10-CM

## 2023-01-31 DIAGNOSIS — M48.061 DEGENERATIVE LUMBAR SPINAL STENOSIS: ICD-10-CM

## 2023-01-31 DIAGNOSIS — N18.32 STAGE 3B CHRONIC KIDNEY DISEASE: ICD-10-CM

## 2023-01-31 PROCEDURE — 1170F FXNL STATUS ASSESSED: CPT | Performed by: FAMILY MEDICINE

## 2023-01-31 PROCEDURE — 1125F AMNT PAIN NOTED PAIN PRSNT: CPT | Performed by: FAMILY MEDICINE

## 2023-01-31 PROCEDURE — 99214 OFFICE O/P EST MOD 30 MIN: CPT | Performed by: FAMILY MEDICINE

## 2023-01-31 PROCEDURE — 1159F MED LIST DOCD IN RCRD: CPT | Performed by: FAMILY MEDICINE

## 2023-01-31 PROCEDURE — G0439 PPPS, SUBSEQ VISIT: HCPCS | Performed by: FAMILY MEDICINE

## 2023-01-31 RX ORDER — SERTRALINE HYDROCHLORIDE 100 MG/1
100 TABLET, FILM COATED ORAL DAILY
COMMUNITY

## 2023-01-31 RX ORDER — SERTRALINE HYDROCHLORIDE 100 MG/1
100 TABLET, FILM COATED ORAL DAILY
Qty: 90 TABLET | Refills: 1 | Status: SHIPPED | OUTPATIENT
Start: 2023-01-31

## 2023-01-31 RX ORDER — HYDROCODONE BITARTRATE AND ACETAMINOPHEN 10; 325 MG/1; MG/1
1 TABLET ORAL EVERY 6 HOURS PRN
Qty: 120 TABLET | Refills: 0 | Status: SHIPPED | OUTPATIENT
Start: 2023-02-24 | End: 2023-02-24 | Stop reason: SDUPTHER

## 2023-02-01 LAB
ALBUMIN SERPL-MCNC: 4.7 G/DL (ref 3.5–5.2)
ALBUMIN/GLOB SERPL: 2.2 G/DL
ALP SERPL-CCNC: 108 U/L (ref 39–117)
ALT SERPL-CCNC: 7 U/L (ref 1–41)
AST SERPL-CCNC: 21 U/L (ref 1–40)
BILIRUB SERPL-MCNC: 0.4 MG/DL (ref 0–1.2)
BUN SERPL-MCNC: 26 MG/DL (ref 8–23)
BUN/CREAT SERPL: 15.3 (ref 7–25)
CALCIUM SERPL-MCNC: 9.9 MG/DL (ref 8.6–10.5)
CHLORIDE SERPL-SCNC: 102 MMOL/L (ref 98–107)
CHOLEST SERPL-MCNC: 161 MG/DL (ref 0–200)
CO2 SERPL-SCNC: 26.6 MMOL/L (ref 22–29)
CREAT SERPL-MCNC: 1.7 MG/DL (ref 0.76–1.27)
EGFRCR SERPLBLD CKD-EPI 2021: 39.8 ML/MIN/1.73
GLOBULIN SER CALC-MCNC: 2.1 GM/DL
GLUCOSE SERPL-MCNC: 93 MG/DL (ref 65–99)
HDLC SERPL-MCNC: 53 MG/DL (ref 40–60)
LDLC SERPL CALC-MCNC: 89 MG/DL (ref 0–100)
LDLC/HDLC SERPL: 1.65 {RATIO}
POTASSIUM SERPL-SCNC: 5 MMOL/L (ref 3.5–5.2)
PROT SERPL-MCNC: 6.8 G/DL (ref 6–8.5)
SODIUM SERPL-SCNC: 139 MMOL/L (ref 136–145)
TRIGL SERPL-MCNC: 102 MG/DL (ref 0–150)
VLDLC SERPL CALC-MCNC: 19 MG/DL (ref 5–40)

## 2023-02-02 RX ORDER — OMEPRAZOLE 40 MG/1
40 CAPSULE, DELAYED RELEASE ORAL DAILY
Qty: 90 CAPSULE | Refills: 1 | Status: SHIPPED | OUTPATIENT
Start: 2023-02-02

## 2023-02-15 RX ORDER — ALLOPURINOL 100 MG/1
TABLET ORAL
Qty: 90 TABLET | Refills: 3 | Status: SHIPPED | OUTPATIENT
Start: 2023-02-15

## 2023-02-15 RX ORDER — HYDROCHLOROTHIAZIDE 12.5 MG/1
TABLET ORAL
Qty: 90 TABLET | Refills: 3 | Status: SHIPPED | OUTPATIENT
Start: 2023-02-15

## 2023-02-15 RX ORDER — SPIRONOLACTONE 50 MG/1
TABLET, FILM COATED ORAL
Qty: 90 TABLET | Refills: 3 | Status: SHIPPED | OUTPATIENT
Start: 2023-02-15

## 2023-02-24 DIAGNOSIS — M16.9 HIP ARTHROSIS: ICD-10-CM

## 2023-03-01 RX ORDER — HYDROCODONE BITARTRATE AND ACETAMINOPHEN 10; 325 MG/1; MG/1
1 TABLET ORAL EVERY 6 HOURS PRN
Qty: 120 TABLET | Refills: 0 | Status: SHIPPED | OUTPATIENT
Start: 2023-03-01 | End: 2023-03-30 | Stop reason: SDUPTHER

## 2023-03-30 DIAGNOSIS — M16.9 HIP ARTHROSIS: ICD-10-CM

## 2023-03-31 NOTE — TELEPHONE ENCOUNTER
----- Message from Lashawn Stovall MA sent at 12/12/2022  9:35 AM CST -----  Patient mom kwame called [phone] in reference to needing  a refill on Methalphenidate 20 mg to be sent to [facility]     Rx Refill Note  Requested Prescriptions     Pending Prescriptions Disp Refills   • HYDROcodone-acetaminophen (NORCO)  MG per tablet 120 tablet 0     Sig: Take 1 tablet by mouth Every 6 (Six) Hours As Needed for Moderate Pain.      Last office visit with prescribing clinician: 1/31/2023   Last telemedicine visit with prescribing clinician: 5/2/2023   Next office visit with prescribing clinician: 5/2/2023                         Would you like a call back once the refill request has been completed: [] Yes [] No    If the office needs to give you a call back, can they leave a voicemail: [] Yes [] No    Donte Ramires MA  03/31/23, 07:50 EDT

## 2023-04-02 RX ORDER — HYDROCODONE BITARTRATE AND ACETAMINOPHEN 10; 325 MG/1; MG/1
1 TABLET ORAL EVERY 6 HOURS PRN
Qty: 120 TABLET | Refills: 0 | Status: SHIPPED | OUTPATIENT
Start: 2023-04-02

## 2023-04-25 RX ORDER — OMEPRAZOLE 40 MG/1
40 CAPSULE, DELAYED RELEASE ORAL DAILY
Qty: 90 CAPSULE | Refills: 1 | Status: SHIPPED | OUTPATIENT
Start: 2023-04-25

## 2023-05-02 ENCOUNTER — OFFICE VISIT (OUTPATIENT)
Dept: FAMILY MEDICINE CLINIC | Facility: CLINIC | Age: 83
End: 2023-05-02
Payer: MEDICARE

## 2023-05-02 VITALS
HEART RATE: 74 BPM | OXYGEN SATURATION: 99 % | BODY MASS INDEX: 30.41 KG/M2 | WEIGHT: 189.2 LBS | HEIGHT: 66 IN | TEMPERATURE: 97.7 F | SYSTOLIC BLOOD PRESSURE: 132 MMHG | DIASTOLIC BLOOD PRESSURE: 70 MMHG

## 2023-05-02 DIAGNOSIS — I10 PRIMARY HYPERTENSION: ICD-10-CM

## 2023-05-02 DIAGNOSIS — L91.8 SKIN TAG: ICD-10-CM

## 2023-05-02 DIAGNOSIS — N18.32 STAGE 3B CHRONIC KIDNEY DISEASE: ICD-10-CM

## 2023-05-02 DIAGNOSIS — M48.061 DEGENERATIVE LUMBAR SPINAL STENOSIS: Primary | ICD-10-CM
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
## 2023-05-02 DIAGNOSIS — L03.116 CELLULITIS OF LEFT LOWER EXTREMITY: ICD-10-CM

## 2023-05-02 DIAGNOSIS — I87.8 CHRONIC VENOUS STASIS: ICD-10-CM

## 2023-05-02 DIAGNOSIS — E78.2 MIXED HYPERLIPIDEMIA: ICD-10-CM

## 2023-05-02 DIAGNOSIS — M16.9 HIP ARTHROSIS: ICD-10-CM

## 2023-05-02 DIAGNOSIS — S00.10XA BLACK EYE, UNSPECIFIED LATERALITY, INITIAL ENCOUNTER: ICD-10-CM

## 2023-05-02 DIAGNOSIS — C61 PROSTATE CANCER: ICD-10-CM

## 2023-05-02 DIAGNOSIS — Z79.899 HIGH RISK MEDICATION USE: ICD-10-CM

## 2023-05-02 DIAGNOSIS — F33.0 MILD EPISODE OF RECURRENT MAJOR DEPRESSIVE DISORDER: ICD-10-CM

## 2023-05-02 PROBLEM — S81.812A LACERATION OF LEFT LOWER EXTREMITY: Status: RESOLVED | Noted: 2022-07-05 | Resolved: 2023-05-02

## 2023-05-02 RX ORDER — AMOXICILLIN AND CLAVULANATE POTASSIUM 875; 125 MG/1; MG/1
1 TABLET, FILM COATED ORAL 2 TIMES DAILY
Qty: 20 TABLET | Refills: 0 | Status: CANCELLED | OUTPATIENT
Start: 2023-05-02

## 2023-05-02 RX ORDER — SULFAMETHOXAZOLE AND TRIMETHOPRIM 800; 160 MG/1; MG/1
1 TABLET ORAL 2 TIMES DAILY
Qty: 20 TABLET | Refills: 0 | Status: CANCELLED | OUTPATIENT
Start: 2023-05-02

## 2023-05-02 RX ORDER — CEPHALEXIN 500 MG/1
500 CAPSULE ORAL 3 TIMES DAILY
Qty: 21 CAPSULE | Refills: 0 | Status: SHIPPED | OUTPATIENT
Start: 2023-05-02

## 2023-05-02 RX ORDER — HYDROCODONE BITARTRATE AND ACETAMINOPHEN 10; 325 MG/1; MG/1
1 TABLET ORAL EVERY 6 HOURS PRN
Qty: 120 TABLET | Refills: 0 | Status: SHIPPED | OUTPATIENT
Start: 2023-05-02

## 2023-05-02 RX ORDER — MOMETASONE FUROATE 1 MG/G
1 CREAM TOPICAL DAILY
Qty: 60 G | Refills: 1 | Status: CANCELLED | OUTPATIENT
Start: 2023-05-02

## 2023-05-02 NOTE — PATIENT INSTRUCTIONS
Start Keflex 3 times a day x1 week for cellulitis of left lower extremity    Needs a follow-up with dermatologist for skin tag removal  Follow-up with kidney doctor as planned    Safety precautions needed    No change with medications.

## 2023-05-02 NOTE — PROGRESS NOTES
"Chief Complaint  Hyperlipidemia (3 months check up wants itchy spot on back checked), Depression, Hypertension, Hip arthrosis, Chronic Kidney Disease, Fall (Recent fall in his shop hit head has a large black eye that he states glassed caused no treatment), and Cellulitis (Podiatrist wanted to prescribe antibiotics but insurance would not cover LLE swelling)     3-month follow-up for lumbar spinal stenosis, hip arthrosis, CKD, HTN, depression, and complaints of recent fall last week now with black eye, itchy spot on his back, and concern for cellulitis in left leg again    LOV with me in February for Medicare wellness, chronic med refills with labs, and uncontrolled depression  -- Routine labs all done, WNL.    -- Zoloft increased to 100 mg nightly due to uncontrolled depression, no change with Cymbalta 30 mg daily    Overall, doing okay.  Except for his recent fall last week.  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  He hit his head but no loss of consciousness.  Now has a large black right eye but no vision changes.  Initially had lots of bleeding from his scalp and some mild headache only.  Never did go to the ER or immediate care center.    Also, has concerns for recurrent cellulitis in his left lower extremity.  No history of venous stasis changes and frequent issues with edema and cellulitis in the past.  Seeing podiatrist last week who prescribed Keflex; however, apparently this was going to cost over $100 so he never did fill the prescription.  No fever, increased leg pain.    And, complains of a \"itchy spot\" on his back.  Desires treatment.  Has dermatologist.    Otherwise, needs chronic med refills.  Requesting yearly PSA screening due to history of prostate cancer.  Compliant with and tolerates all medications without side effects.  Doing better since increased dose of Zoloft.  Still relies on Norco 10 mg a quantity of 3 to 4/day for significant " "lumbar spinal stenosis and bilateral hip arthrosis.  Intolerant to NSAIDs due to CKD.      Review of Systems   Constitutional: Negative for fever and unexpected weight change.   Respiratory: Negative for cough and shortness of breath.    Cardiovascular: Negative for chest pain.        Subjective          Kike Rubio presents to [facility]    Objective   Vital Signs:   Vitals:    05/02/23 1102   BP: 132/70   BP Location: Right arm   Patient Position: Sitting   Cuff Size: Adult   Pulse: 74   Temp: 97.7 °F (36.5 °C)   SpO2: 99%   Weight: 85.8 kg (189 lb 3.2 oz)   Height: 167.6 cm (66\")      Body mass index is 30.54 kg/m².   Physical Exam  Vitals and nursing note reviewed.   Constitutional:       Appearance: Normal appearance. He is well-developed.   HENT:      Head: Normocephalic and atraumatic.      Nose: Nose normal.   Eyes:      Conjunctiva/sclera: Conjunctivae normal.      Pupils: Pupils are equal, round, and reactive to light.      Comments: OD --mild/moderate periorbital contusion  No change in vision   Neck:      Thyroid: No thyromegaly.   Cardiovascular:      Rate and Rhythm: Normal rate and regular rhythm.      Heart sounds: Normal heart sounds. No murmur heard.  Pulmonary:      Effort: Pulmonary effort is normal.      Breath sounds: Normal breath sounds.   Abdominal:      General: Abdomen is flat. Bowel sounds are normal. There is no distension.      Palpations: Abdomen is soft. There is no hepatomegaly, splenomegaly or mass.      Tenderness: There is no abdominal tenderness. There is no guarding or rebound.      Hernia: No hernia is present.   Musculoskeletal:         General: Normal range of motion.      Cervical back: Normal range of motion and neck supple.      Right lower leg: No edema.      Left lower leg: No edema.      Comments: Left lower extremity --- trace to 1+ edema, with brawny erythematous changes, some mild warmth and increased erythema, no drainage,  "   Lymphadenopathy:      Cervical: No cervical adenopathy.   Skin:     General: Skin is warm.      Comments: Mid back --approximately 1 cm raised pedunculated skin tag, no surrounding erythema or excoriations   Neurological:      General: No focal deficit present.      Mental Status: He is alert.   Psychiatric:         Mood and Affect: Mood normal.         Behavior: Behavior normal.         Thought Content: Thought content normal.         Judgment: Judgment normal.        Result Review :     Common labs        10/18/2022    10:13 1/31/2023    12:03   Common Labs   Glucose 93   93     BUN 24   26     Creatinine 1.84   1.70     Sodium 139   139     Potassium 4.7   5.0     Chloride 102   102     Calcium 9.8   9.9     Total Protein 6.5   6.8     Albumin 4.10   4.7     Total Bilirubin 0.4   0.4     Alkaline Phosphatase 119   108     AST (SGOT) 20   21     ALT (SGPT) 10   7     WBC 6.84      Hemoglobin 13.2      Hematocrit 38.5      Platelets 173      Total Cholesterol 166   161     Triglycerides 99   102     HDL Cholesterol 56   53     LDL Cholesterol  92   89       TSH        10/18/2022    10:13   TSH   TSH 1.890               Lab Results   Component Value Date    ETDL41KK 61.8 09/15/2021    FOLATE >20.0 02/12/2020               Assessment and Plan    Diagnoses and all orders for this visit:    1. Degenerative lumbar spinal stenosis (Primary)  -stable  Due to update Urine Tox screen and controlled substance contract today.  Kunal report reviewed, appropriate.  Very low risk patient behavior.  Continue/refill Norco as directed below, refill due today  Intolerant to NSAIDs due to CKD  -     ToxASSURE Select 13 Discrete -  -     HYDROcodone-acetaminophen (NORCO)  MG per tablet; Take 1 tablet by mouth Every 6 (Six) Hours As Needed for Moderate Pain.  Dispense: 120 tablet; Refill: 0    2. Hip arthrosis  -stable, same plan as above  -     ToxASSURE Select 13 Discrete -  -     HYDROcodone-acetaminophen (NORCO)  MG  per tablet; Take 1 tablet by mouth Every 6 (Six) Hours As Needed for Moderate Pain.  Dispense: 120 tablet; Refill: 0    3. Mild episode of recurrent major depressive disorder  -doing better.  Plan to continue Zoloft 100 mg nightly and Cymbalta 30 mg daily    4. Cellulitis of left lower extremity  -uncontrolled  Due to chronic venous stasis  Start Keflex 500 mg TID x1 week    5. Chronic venous stasis  -fair control  Continue with Kenalog ointment as needed  Continue with HCTZ and spironolactone as prescribed  Labs up-to-date, stable    6. Fall, initial encounter  -acute fall, last week  Accidental, history of poor balance and immobility  No loss of consciousness.  Stressed the importance of safety, using walker etc.  Need to possibly consider decreasing dose of Norco given high risk for falls etc.?    7. Black eye, unspecified laterality, initial encounter  -right eye, new diagnosis  Status post fall last week.  No loss of consciousness, no vision changes.  Clinically resolving    8. Skin tag  -on back, new diagnosis  Large 1 cm pedunculated lesion, irritating.  Desires removal  Either needs to schedule an appointment here or with his dermatologist for removal    9. Prostate cancer  -remains in remission, due to update yearly PSA  -     PSA DIAGNOSTIC ONLY    10. Primary hypertension  -controlled  No change with HCTZ or spironolactone, labs up-to-date    11. Mixed hyperlipidemia  -controlled  Lipids and LFTs up-to-date and therapeutic.  Continue Crestor 20 mg daily    12. Stage 3b chronic kidney disease  -stable  Continue to avoid NSAIDs    13. High risk medication use  -     ToxASSURE Select 13 Discrete -    Other orders  -     cephalexin (Keflex) 500 MG capsule; Take 1 capsule by mouth 3 (Three) Times a Day.  Dispense: 21 capsule; Refill: 0      CMP, lipids due next visit in 3 months      I spent 40 minutes caring for Kike on this date of service. This time includes time spent by me in the following  activities:preparing for the visit, reviewing tests, obtaining and/or reviewing a separately obtained history, performing a medically appropriate examination and/or evaluation , counseling and educating the patient/family/caregiver, ordering medications, tests, or procedures, documenting information in the medical record, independently interpreting results and communicating that information with the patient/family/caregiver, care coordination and Multiple new uncontrolled high risk diagnoses addressed.  In addition to multiple chronic management of medications, labs, diagnoses.  Follow Up   Return in about 3 months (around 8/2/2023) for Recheck.  Patient was given instructions and counseling regarding his condition or for health maintenance advice. Please see specific information pulled into the AVS if appropriate.

## 2023-05-07 LAB
6MAM UR QL CFM: NEGATIVE
6MAM/CREAT UR: NOT DETECTED NG/MG CREAT
7AMINOCLONAZEPAM/CREAT UR: NOT DETECTED NG/MG CREAT
A-OH ALPRAZ/CREAT UR: NOT DETECTED NG/MG CREAT
A-OH-TRIAZOLAM/CREAT UR CFM: NOT DETECTED NG/MG CREAT
ALFENTANIL/CREAT UR CFM: NOT DETECTED NG/MG CREAT
ALPHA-HYDROXYMIDAZOLAM, URINE: NOT DETECTED NG/MG CREAT
ALPRAZ/CREAT UR CFM: NOT DETECTED NG/MG CREAT
AMOBARBITAL UR QL CFM: NOT DETECTED
AMPHET/CREAT UR: NOT DETECTED NG/MG CREAT
AMPHETAMINES UR QL CFM: NEGATIVE
BARBITAL UR QL CFM: NOT DETECTED
BARBITURATES UR QL CFM: NEGATIVE
BENZODIAZ UR QL CFM: NEGATIVE
BUPRENORPHINE UR QL CFM: NEGATIVE
BUPRENORPHINE/CREAT UR: NOT DETECTED NG/MG CREAT
BUTABARBITAL UR QL CFM: NOT DETECTED
BUTALBITAL UR QL CFM: NOT DETECTED
BZE/CREAT UR: NOT DETECTED NG/MG CREAT
CANNABINOIDS UR QL CFM: NEGATIVE
CARBOXYTHC/CREAT UR: NOT DETECTED NG/MG CREAT
CLONAZEPAM/CREAT UR CFM: NOT DETECTED NG/MG CREAT
COCAETHYLENE/CREAT UR CFM: NOT DETECTED NG/MG CREAT
COCAINE UR QL CFM: NEGATIVE
COCAINE/CREAT UR CFM: NOT DETECTED NG/MG CREAT
CODEINE/CREAT UR: NOT DETECTED NG/MG CREAT
CREAT UR-MCNC: 83 MG/DL
DESALKYLFLURAZ/CREAT UR: NOT DETECTED NG/MG CREAT
DESMETHYLFLUNITRAZEPAM: NOT DETECTED NG/MG CREAT
DHC/CREAT UR: 1298 NG/MG CREAT
DIAZEPAM/CREAT UR: NOT DETECTED NG/MG CREAT
DRUGS UR: NORMAL
EDDP/CREAT UR: NOT DETECTED NG/MG CREAT
ETHANOL UR CFM-MCNC: NOT DETECTED G/DL
ETHANOL UR QL CFM: NEGATIVE
FENTANYL UR QL CFM: NEGATIVE
FENTANYL/CREAT UR: NOT DETECTED NG/MG CREAT
FLUNITRAZEPAM UR QL CFM: NOT DETECTED NG/MG CREAT
HYDROCODONE/CREAT UR: 3941 NG/MG CREAT
HYDROMORPHONE/CREAT UR: 1386 NG/MG CREAT
LORAZEPAM/CREAT UR: NOT DETECTED NG/MG CREAT
MDA/CREAT UR: NOT DETECTED NG/MG CREAT
MDMA/CREAT UR: NOT DETECTED NG/MG CREAT
MEPHOBARBITAL UR QL CFM: NOT DETECTED
METHADONE UR QL CFM: NEGATIVE
METHADONE/CREAT UR: NOT DETECTED NG/MG CREAT
METHAMPHET/CREAT UR: NOT DETECTED NG/MG CREAT
MIDAZOLAM/CREAT UR CFM: NOT DETECTED NG/MG CREAT
MORPHINE/CREAT UR: NOT DETECTED NG/MG CREAT
N-NORTRAMADOL/CREAT UR CFM: NOT DETECTED NG/MG CREAT
NARCOTICS UR: NEGATIVE
NORBUPRENORPHINE/CREAT UR: NOT DETECTED NG/MG CREAT
NORCODEINE/CREAT UR CFM: NOT DETECTED NG/MG CREAT
NORDIAZEPAM/CREAT UR: NOT DETECTED NG/MG CREAT
NORFENTANYL/CREAT UR: NOT DETECTED NG/MG CREAT
NORHYDROCODONE/CREAT UR: 5243 NG/MG CREAT
NORMORPHINE UR-MCNC: NOT DETECTED NG/MG CREAT
NOROXYCODONE/CREAT UR: NOT DETECTED NG/MG CREAT
NOROXYMORPHONE/CREAT UR CFM: NOT DETECTED NG/MG CREAT
O-NORTRAMADOL UR CFM-MCNC: NOT DETECTED NG/MG CREAT
OPIATES UR QL CFM: NORMAL
OXAZEPAM/CREAT UR: NOT DETECTED NG/MG CREAT
OXYCODONE UR QL CFM: NEGATIVE
OXYCODONE/CREAT UR: NOT DETECTED NG/MG CREAT
OXYMORPHONE/CREAT UR: NOT DETECTED NG/MG CREAT
PENTOBARB UR QL CFM: NOT DETECTED
PHENOBARB UR QL CFM: NOT DETECTED
PSA SERPL-MCNC: <0.014 NG/ML (ref 0–4)
SECOBARBITAL UR QL CFM: NOT DETECTED
SERVICE CMNT 02-IMP: NORMAL
SUFENTANIL/CREAT UR CFM: NOT DETECTED NG/MG CREAT
TAPENTADOL UR QL CFM: NEGATIVE
TAPENTADOL/CREAT UR: NOT DETECTED NG/MG CREAT
TEMAZEPAM/CREAT UR: NOT DETECTED NG/MG CREAT
THIOPENTAL UR QL CFM: NOT DETECTED
TRAMADOL UR QL CFM: NOT DETECTED NG/MG CREAT

## 2023-06-16 DIAGNOSIS — M16.9 HIP ARTHROSIS: ICD-10-CM

## 2023-06-16 DIAGNOSIS — M48.061 DEGENERATIVE LUMBAR SPINAL STENOSIS: ICD-10-CM

## 2023-06-16 RX ORDER — SERTRALINE HYDROCHLORIDE 100 MG/1
TABLET, FILM COATED ORAL
Qty: 90 TABLET | Refills: 3 | Status: SHIPPED | OUTPATIENT
Start: 2023-06-16

## 2023-06-16 RX ORDER — ROSUVASTATIN CALCIUM 20 MG/1
TABLET, COATED ORAL
Qty: 90 TABLET | Refills: 3 | Status: SHIPPED | OUTPATIENT
Start: 2023-06-16

## 2023-06-16 NOTE — TELEPHONE ENCOUNTER
----- Message from Kike Rubio sent at 6/15/2023  1:08 PM EDT -----  Regarding: pain pills  Contact: [phone]  Dr. Morin,  I put in a request for my pain pills on June 5 2023.  I still don't have them.  Is there something wrong?   Thank you  Kike Rubio

## 2023-06-16 NOTE — TELEPHONE ENCOUNTER
Rx Refill Note  Requested Prescriptions     Pending Prescriptions Disp Refills    HYDROcodone-acetaminophen (NORCO)  MG per tablet 120 tablet 0     Sig: Take 1 tablet by mouth Every 6 (Six) Hours As Needed for Moderate Pain.      Last office visit with prescribing clinician: 5/2/2023   Last telemedicine visit with prescribing clinician: Visit date not found   Next office visit with prescribing clinician: 8/11/2023                         Would you like a call back once the refill request has been completed: [] Yes [] No    If the office needs to give you a call back, can they leave a voicemail: [] Yes [] No    Donte Ramires MA  06/16/23, 07:41 EDT

## 2023-06-18 RX ORDER — HYDROCODONE BITARTRATE AND ACETAMINOPHEN 10; 325 MG/1; MG/1
1 TABLET ORAL EVERY 6 HOURS PRN
Qty: 120 TABLET | Refills: 0 | Status: SHIPPED | OUTPATIENT
Start: 2023-06-18

## 2023-08-08 RX ORDER — OMEPRAZOLE 40 MG/1
CAPSULE, DELAYED RELEASE ORAL
Qty: 90 CAPSULE | Refills: 3 | Status: SHIPPED | OUTPATIENT
Start: 2023-08-08

## 2023-08-10 DIAGNOSIS — M16.9 HIP ARTHROSIS: ICD-10-CM

## 2023-08-10 DIAGNOSIS — M48.061 DEGENERATIVE LUMBAR SPINAL STENOSIS: ICD-10-CM

## 2023-08-10 NOTE — TELEPHONE ENCOUNTER
Rx Refill Note  Requested Prescriptions     Pending Prescriptions Disp Refills    HYDROcodone-acetaminophen (NORCO)  MG per tablet 120 tablet 0     Sig: Take 1 tablet by mouth Every 6 (Six) Hours As Needed for Moderate Pain.      Last office visit with prescribing clinician: 5/2/2023   Last telemedicine visit with prescribing clinician: Visit date not found   Next office visit with prescribing clinician: 9/20/2023                         Would you like a call back once the refill request has been completed: [] Yes [] No    If the office needs to give you a call back, can they leave a voicemail: [] Yes [] No    Brooke Mckeon MA  08/10/23, 13:50 EDT

## 2023-08-16 RX ORDER — HYDROCODONE BITARTRATE AND ACETAMINOPHEN 10; 325 MG/1; MG/1
1 TABLET ORAL EVERY 6 HOURS PRN
Qty: 120 TABLET | Refills: 0 | Status: SHIPPED | OUTPATIENT
Start: 2023-08-16

## 2023-09-11 DIAGNOSIS — M48.061 DEGENERATIVE LUMBAR SPINAL STENOSIS: ICD-10-CM

## 2023-09-11 DIAGNOSIS — M16.9 HIP ARTHROSIS: ICD-10-CM

## 2023-09-11 NOTE — TELEPHONE ENCOUNTER
Rx Refill Note  Requested Prescriptions     Pending Prescriptions Disp Refills    HYDROcodone-acetaminophen (NORCO)  MG per tablet 120 tablet 0     Sig: Take 1 tablet by mouth Every 6 (Six) Hours As Needed for Moderate Pain.      Last office visit with prescribing clinician: 5/2/2023   Last telemedicine visit with prescribing clinician: Visit date not found   Next office visit with prescribing clinician: 9/20/2023                         Would you like a call back once the refill request has been completed: [] Yes [] No    If the office needs to give you a call back, can they leave a voicemail: [] Yes [] No    Donte Ramires CMA/LMR  09/11/23, 11:38 EDT

## 2023-09-14 RX ORDER — HYDROCODONE BITARTRATE AND ACETAMINOPHEN 10; 325 MG/1; MG/1
1 TABLET ORAL EVERY 6 HOURS PRN
Qty: 120 TABLET | Refills: 0 | Status: SHIPPED | OUTPATIENT
Start: 2023-09-14

## 2023-09-20 ENCOUNTER — OFFICE VISIT (OUTPATIENT)
Dept: FAMILY MEDICINE CLINIC | Facility: CLINIC | Age: 83
End: 2023-09-20
Payer: MEDICARE

## 2023-09-20 VITALS
TEMPERATURE: 97.8 F | DIASTOLIC BLOOD PRESSURE: 68 MMHG | HEIGHT: 66 IN | HEART RATE: 80 BPM | OXYGEN SATURATION: 98 % | SYSTOLIC BLOOD PRESSURE: 134 MMHG | WEIGHT: 187.4 LBS | BODY MASS INDEX: 30.12 KG/M2

## 2023-09-20 DIAGNOSIS — E78.2 MIXED HYPERLIPIDEMIA: ICD-10-CM

## 2023-09-20 DIAGNOSIS — I10 PRIMARY HYPERTENSION: ICD-10-CM

## 2023-09-20 DIAGNOSIS — I87.8 CHRONIC VENOUS STASIS: ICD-10-CM

## 2023-09-20 DIAGNOSIS — M16.9 HIP ARTHROSIS: ICD-10-CM

## 2023-09-20 DIAGNOSIS — R53.82 CHRONIC FATIGUE: Primary | ICD-10-CM

## 2023-09-20 DIAGNOSIS — Z87.39 HISTORY OF RHEUMATOID ARTHRITIS: ICD-10-CM

## 2023-09-20 DIAGNOSIS — S40.021A CONTUSION OF BOTH UPPER EXTREMITIES, INITIAL ENCOUNTER: ICD-10-CM

## 2023-09-20 DIAGNOSIS — S40.022A CONTUSION OF BOTH UPPER EXTREMITIES, INITIAL ENCOUNTER: ICD-10-CM

## 2023-09-20 DIAGNOSIS — R68.2 DRY MOUTH: ICD-10-CM

## 2023-09-20 DIAGNOSIS — J43.8 OTHER EMPHYSEMA: ICD-10-CM

## 2023-09-20 DIAGNOSIS — F41.8 ANXIETY WITH DEPRESSION: ICD-10-CM

## 2023-09-20 DIAGNOSIS — G47.33 OBSTRUCTIVE SLEEP APNEA SYNDROME: ICD-10-CM

## 2023-09-20 DIAGNOSIS — N18.32 STAGE 3B CHRONIC KIDNEY DISEASE: ICD-10-CM

## 2023-09-20 DIAGNOSIS — M48.061 DEGENERATIVE LUMBAR SPINAL STENOSIS: ICD-10-CM

## 2023-09-20 PROBLEM — B35.1 ONYCHOMYCOSIS: Status: ACTIVE | Noted: 2023-09-20

## 2023-09-20 PROBLEM — M08.90 JUVENILE ARTHRITIS: Status: ACTIVE | Noted: 2023-09-20

## 2023-09-20 PROBLEM — G60.9 IDIOPATHIC PERIPHERAL NEUROPATHY: Status: ACTIVE | Noted: 2023-09-20

## 2023-09-20 PROBLEM — Z98.890 HISTORY OF CEA (CAROTID ENDARTERECTOMY): Status: ACTIVE | Noted: 2023-09-20

## 2023-09-20 RX ORDER — HYDROCODONE BITARTRATE AND ACETAMINOPHEN 10; 325 MG/1; MG/1
1 TABLET ORAL EVERY 6 HOURS PRN
Qty: 120 TABLET | Refills: 0 | Status: CANCELLED | OUTPATIENT
Start: 2023-09-20

## 2023-09-20 NOTE — PROGRESS NOTES
Chief Complaint  Back Pain (Degenerative lumbar spinal stenosis med refills 3 months check up), Osteoarthritis, Hyperlipidemia, Chronic Kidney Disease, Hip arthrosis, Hypertension, Fatigue (Problem staying awake), and Dry Mouth (With foul taste in mouth)    3-month follow-up for chronic lumbar spinal stenosis, hip OA, and chronic venous stasis  And  6-month follow-up for HTN, hyperlipidemia, COPD, CKD, and complaints of chronic fatigue/daytime sleepiness and dry mouth with foul taste in mouth    LOV with me about 4 months ago (regularly schedule III month appointment had to be rescheduled) for chronic med refills and after a bad fall with subsequent black eye and contusions.  -- No med changes made.  --- Discussed safety and mobility issues.  No injury requiring further work-up.  --- Screening PSA done per his request given history of prostate cancer, negative  --- Also treated with Keflex for an exacerbation of his venous stasis cellulitis    Overall, doing okay.  No further falls, ER visits or hospitalizations.  Maintains yearly follow-up with nephrologist/Dr. Romo, all stable.  Not seeing any other specialist.  Tries to maintain a healthy diet, no exercise due to immobility.  Weight remains stable.  Mood is fair.  Sleeping too much!!  No chest pain, SOA, or increased edema    Today, has several concerns and complaints:  --- Concerned about easy bruising on his forearms.  Currently on  mg daily, has been on this dose for many years.  Status post CEA in distant past.    --- Complains of chronic fatigue x at least 1 year.  Excessive daytime sleepiness despite 9 to 10 hours of sleep per night.  May occasionally get up to go the bathroom but easily goes back to bed.  Despite a good night sleep, will nap during the day.  Compliant with his CPAP but many years since evaluated.  He did get new equipment a few years ago when it was recalled.  He is concerned about blood cancer?  Known history of juvenile arthritis,  "no longer seeing his rheumatologist/Dr. Allen??    --- Complains of a dry mouth and \"foul-smelling taste.\"  He is on both HCTZ and spironolactone.    Otherwise, needs chronic med refills.  Due for 6-month fasting labs.  Compliant with and tolerates all medications without side effects.  Still relies on Norco 10 mg a quantity of 4/day for significant lumbar spinal stenosis and hip arthrosis.  S/P THR in past.  Frustrated with the national backorder situation with Norco, requesting additional amount to cover him in the event the pharmacy does not have this medication.      Review of Systems   Constitutional:  Positive for fatigue. Negative for fever and unexpected weight change.   Respiratory:  Negative for cough and shortness of breath.    Cardiovascular:  Negative for chest pain.      Subjective          Kike Rubio presents to [facility]    Objective   Vital Signs:   Vitals:    09/20/23 1247   BP: 134/68   BP Location: Left arm   Patient Position: Sitting   Cuff Size: Adult   Pulse: 80   Temp: 97.8 °F (36.6 °C)   SpO2: 98%   Weight: 85 kg (187 lb 6.4 oz)   Height: 167.6 cm (66\")      Body mass index is 30.25 kg/m².   Physical Exam  Vitals and nursing note reviewed.   Constitutional:       Appearance: Normal appearance. He is well-developed.   HENT:      Head: Normocephalic and atraumatic.      Nose: Nose normal.   Eyes:      Conjunctiva/sclera: Conjunctivae normal.      Pupils: Pupils are equal, round, and reactive to light.   Neck:      Thyroid: No thyromegaly.   Cardiovascular:      Rate and Rhythm: Normal rate and regular rhythm.      Heart sounds: Normal heart sounds. No murmur heard.  Pulmonary:      Effort: Pulmonary effort is normal.      Breath sounds: Normal breath sounds.   Abdominal:      General: Abdomen is flat. Bowel sounds are normal. There is no distension.      Palpations: Abdomen is soft. There is no hepatomegaly, splenomegaly or mass.      Tenderness: There is no " abdominal tenderness. There is no guarding or rebound.      Hernia: No hernia is present.   Musculoskeletal:         General: Normal range of motion.      Cervical back: Normal range of motion and neck supple.      Right lower leg: No edema.      Left lower leg: No edema.   Lymphadenopathy:      Cervical: No cervical adenopathy.   Skin:     General: Skin is warm.      Comments: Bilateral lower extremities --- chronic venous stasis, no warmth erythema, trace edema only    Bilateral forearms --- very mild/subtle benign bruises   Neurological:      General: No focal deficit present.      Mental Status: He is alert.   Psychiatric:         Mood and Affect: Mood normal.         Behavior: Behavior normal.         Thought Content: Thought content normal.         Judgment: Judgment normal.      Result Review :     Common labs          10/18/2022    10:13 1/31/2023    12:03 5/2/2023    12:06   Common Labs   Glucose 93  93     BUN 24  26     Creatinine 1.84  1.70     Sodium 139  139     Potassium 4.7  5.0     Chloride 102  102     Calcium 9.8  9.9     Total Protein 6.5  6.8     Albumin 4.10  4.7     Total Bilirubin 0.4  0.4     Alkaline Phosphatase 119  108     AST (SGOT) 20  21     ALT (SGPT) 10  7     WBC 6.84      Hemoglobin 13.2      Hematocrit 38.5      Platelets 173      Total Cholesterol 166  161     Triglycerides 99  102     HDL Cholesterol 56  53     LDL Cholesterol  92  89     PSA   <0.014      TSH          10/18/2022    10:13   TSH   TSH 1.890            Lab Results   Component Value Date    ARMA41CB 61.8 09/15/2021    FOLATE >20.0 02/12/2020                   Assessment and Plan    Diagnoses and all orders for this visit:    1. Chronic fatigue (Primary) --new Dx/uncontrolled  Excessive daytime sleepiness despite adequate sleep.  Suspect this is multifactorial---uncontrolled TONYA?  Age/multiple medications?  History of RA?  Check CBC, CMP, vitamin levels, TSH  Referral to sleep medicine and recommend following back  up with his rheumatologist/Dr. Allen  Further recommendations to follow  -     Vitamin D,25-Hydroxy  -     Vitamin B12 & Folate    2. Obstructive sleep apnea syndrome --uncontrolled  Overdue for reevaluation with sleep medicine, especially given excessive daytime fatigue  -     Ambulatory Referral to Sleep Medicine    3. History of rheumatoid arthritis --uncontrolled  Needs to follow back up with his rheumatologist/Dr. Allen, referral needs to be updated given that it has been a few years  -     Ambulatory Referral to Rheumatology    4. Degenerative lumbar spinal stenosis --stable  Urine Tox screen/May 2023, controlled substance contract, and Kunal report all up-to-date and reviewed.  Low risk patient.  No change with Norco 10 mg, quantity of 4/day.  Recently refilled    5. Hip arthrosis --same plan as above  No NSAIDs due to CKD.  As [place].  Referral back to rheumatologist    6. Stage 3b chronic kidney disease --stable  Recheck renal function, CBC, sees nephrologist yearly  Remain off NSAIDs  -     CBC & Differential; Future  -     Comprehensive Metabolic Panel; Future    7. Primary hypertension --controlled  Plan to continue HCTZ and spironolactone as prescribed  -     CBC & Differential; Future    8. Chronic venous stasis --stable, no change with spironolactone or HCTZ    9. Mixed hyperlipidemia --controlled  Due to recheck lipids, CMP, TSH  Goal LDL needs remain less than 70 given history of CEA  If at goal plan to continue Crestor 20 mg daily, may need to increase to 40 mg?  -     Comprehensive Metabolic Panel; Future  -     Lipid Panel With LDL / HDL Ratio; Future  -     TSH; Future    10. Other emphysema --stable  No change with Dulera or Combivent    11. Anxiety with depression --fair control  Continue both Cymbalta and Zoloft as prescribed    12. Dry mouth --new Dx  History of autoimmune disorder/rheumatoid, referral back to rheumatologist  Also likely component this is diuretics, which cannot be  changed    13. Contusion of both upper extremities, initial encounter --very mild.  Reassurance given.  Discussed ending skin as he is 82 years old and on  mg daily  Did possibly discuss decreasing ASA to 81 mg, but he is reluctant to do this.      I spent 45 minutes caring for Kike on this date of service. This time includes time spent by me in the following activities:preparing for the visit, reviewing tests, obtaining and/or reviewing a separately obtained history, performing a medically appropriate examination and/or evaluation , counseling and educating the patient/family/caregiver, ordering medications, tests, or procedures, referring and communicating with other health care professionals , documenting information in the medical record, independently interpreting results and communicating that information with the patient/family/caregiver, care coordination, and extensive education/counseling and management of multiple new and chronic uncontrolled diagnoses.  Multiple complaints and chronic stuff  Follow Up   Return in about 3 months (around 12/20/2023) for Recheck, make 3-month and 6-month Medicare visit.  Patient was given instructions and counseling regarding his condition or for health maintenance advice. Please see specific information pulled into the AVS if appropriate.

## 2023-09-20 NOTE — PATIENT INSTRUCTIONS
Needs to get lots of lab work within the next 2 weeks, fasting for routine labs as well as a work-up for chronic fatigue labs    Referral will be placed to sleep medicine to evaluate for chronic fatigue and longstanding history of TONYA    Referral updated to rheumatologist due to history of rheumatoid arthritis and chronic fatigue    Continue current treatment plan.

## 2023-09-21 PROBLEM — F41.8 ANXIETY WITH DEPRESSION: Status: ACTIVE | Noted: 2023-09-21

## 2023-09-27 DIAGNOSIS — M48.061 DEGENERATIVE LUMBAR SPINAL STENOSIS: ICD-10-CM

## 2023-09-27 DIAGNOSIS — M16.9 HIP ARTHROSIS: ICD-10-CM

## 2023-09-27 NOTE — TELEPHONE ENCOUNTER
Rx Refill Note  Requested Prescriptions     Pending Prescriptions Disp Refills    HYDROcodone-acetaminophen (NORCO)  MG per tablet 120 tablet 0     Sig: Take 1 tablet by mouth Every 6 (Six) Hours As Needed for Moderate Pain.      Last office visit with prescribing clinician: 9/20/2023   Last telemedicine visit with prescribing clinician: Visit date not found   Next office visit with prescribing clinician: 12/22/2023                         Would you like a call back once the refill request has been completed: [] Yes [] No    If the office needs to give you a call back, can they leave a voicemail: [] Yes [] No    Donte Ramires CMA/LMR  09/27/23, 15:21 EDT

## 2023-09-27 NOTE — TELEPHONE ENCOUNTER
Caller: Kike Rubio    Relationship: Self    Best call back number: [phone]    Requested Prescriptions:   Requested Prescriptions     Pending Prescriptions Disp Refills    HYDROcodone-acetaminophen (NORCO)  MG per tablet 120 tablet 0     Sig: Take 1 tablet by mouth Every 6 (Six) Hours As Needed for Moderate Pain.        Pharmacy where request should be sent: [facility] [address] [phone] [facility] [phone]      Last office visit with prescribing clinician: 9/20/2023   Last telemedicine visit with prescribing clinician: Visit date not found   Next office visit with prescribing clinician: 12/22/2023     Additional details provided by patient: THE PATIENT'S LOCAL PHARMACY STATES THAT THIS MEDICATION IS OUT-OF-STOCK UNTIL THE END OF OCTOBER, 2023. THE PATIENT WOULD LIKE TO TRY [facility] INSTEAD. PLEASE ADVISE.     Does the patient have less than a 3 day supply:  [x] Yes  [] No    Would you like a call back once the refill request has been completed: [x] Yes [] No    If the office needs to give you a call back, can they leave a voicemail: [x] Yes [] No    Clark Gomez Rep   09/27/23 15:03 EDT

## 2023-09-28 RX ORDER — HYDROCODONE BITARTRATE AND ACETAMINOPHEN 10; 325 MG/1; MG/1
1 TABLET ORAL EVERY 6 HOURS PRN
Qty: 120 TABLET | Refills: 0 | Status: SHIPPED | OUTPATIENT
Start: 2023-09-28

## 2023-09-28 NOTE — TELEPHONE ENCOUNTER
[facility] WILL SEND MONTHLY PLEASE DO HE HAS BEEN OUTRx Refill Note  Requested Prescriptions     Pending Prescriptions Disp Refills    HYDROcodone-acetaminophen (NORCO)  MG per tablet 120 tablet 0     Sig: Take 1 tablet by mouth Every 6 (Six) Hours As Needed for Moderate Pain.      Last office visit with prescribing clinician: 9/20/2023   Last telemedicine visit with prescribing clinician: Visit date not found   Next office visit with prescribing clinician: 12/22/2023                         Would you like a call back once the refill request has been completed: [] Yes [] No    If the office needs to give you a call back, can they leave a voicemail: [] Yes [] No    Donte Ramires CMA/BRIAN  09/28/23, 07:44 EDT

## 2023-11-07 DIAGNOSIS — M16.9 HIP ARTHROSIS: ICD-10-CM

## 2023-11-07 DIAGNOSIS — M48.061 DEGENERATIVE LUMBAR SPINAL STENOSIS: ICD-10-CM

## 2023-11-08 NOTE — TELEPHONE ENCOUNTER
Rx Refill Note  Requested Prescriptions     Pending Prescriptions Disp Refills    HYDROcodone-acetaminophen (NORCO)  MG per tablet 120 tablet 0     Sig: Take 1 tablet by mouth Every 6 (Six) Hours As Needed for Moderate Pain.      Last office visit with prescribing clinician: 9/20/2023   Last telemedicine visit with prescribing clinician: Visit date not found   Next office visit with prescribing clinician: 12/22/2023                         Would you like a call back once the refill request has been completed: [] Yes [] No    If the office needs to give you a call back, can they leave a voicemail: [] Yes [] No    Donte Ramires CMA/LMR  11/08/23, 07:24 EST

## 2023-11-09 RX ORDER — HYDROCODONE BITARTRATE AND ACETAMINOPHEN 10; 325 MG/1; MG/1
1 TABLET ORAL EVERY 6 HOURS PRN
Qty: 120 TABLET | Refills: 0 | Status: SHIPPED | OUTPATIENT
Start: 2023-11-09

## 2023-12-08 ENCOUNTER — OFFICE VISIT (OUTPATIENT)
Dept: SLEEP MEDICINE | Facility: HOSPITAL | Age: 83
End: 2023-12-08
Payer: MEDICARE

## 2023-12-08 VITALS — WEIGHT: 192 LBS | HEIGHT: 66 IN | HEART RATE: 101 BPM | OXYGEN SATURATION: 96 % | BODY MASS INDEX: 30.86 KG/M2

## 2023-12-08 DIAGNOSIS — J43.8 OTHER EMPHYSEMA: ICD-10-CM

## 2023-12-08 DIAGNOSIS — R06.83 SNORING: ICD-10-CM

## 2023-12-08 DIAGNOSIS — G47.30 SLEEP APNEA, UNSPECIFIED TYPE: Primary | ICD-10-CM

## 2023-12-08 DIAGNOSIS — G47.8 NON-RESTORATIVE SLEEP: ICD-10-CM

## 2023-12-08 DIAGNOSIS — E66.9 OBESITY (BMI 30-39.9): ICD-10-CM

## 2023-12-08 DIAGNOSIS — G47.10 HYPERSOMNIA: ICD-10-CM

## 2023-12-08 PROCEDURE — G0463 HOSPITAL OUTPT CLINIC VISIT: HCPCS

## 2023-12-08 RX ORDER — TRAZODONE HYDROCHLORIDE 50 MG/1
TABLET ORAL
Qty: 1 TABLET | Refills: 0 | Status: SHIPPED | OUTPATIENT
Start: 2023-12-08

## 2023-12-08 NOTE — PROGRESS NOTES
[facility] New Patient/Consultation       Reason for Consultation: Obstructive sleep apnea      Patient Care Team:  Kina Morin MD as PCP - General (Family Medicine)  Mike Leon MD as Consulting Physician (Sleep Medicine)      History of present illness:  Thank you for asking me to see your patient.  The patient is a 83 y.o. male with hypertension arthritis and lung disease presents today to establish care for TONYA.  Reports sleep study around 2012 and currently reports he is on BiPAP.  Sleeps around 8 hours sleep latency 5 to 10 minutes nocturia reported as well as snoring witnessed apneas pain disrupting sleep dry mouth hypersomnia nonrestorative sleep.  BMI 30.99.      Social History: No tobacco alcohol caffeine or drug use    Allergies:  Meperidine, Adhesive tape, and Morphine    Family History: TONYA no       Current Outpatient Medications:     allopurinol (ZYLOPRIM) 100 MG tablet, TAKE 1 TABLET DAILY, Disp: 90 tablet, Rfl: 3    aspirin 325 MG tablet, Take 1 tablet by mouth Daily., Disp: , Rfl:     Cholecalciferol (VITAMIN D) 50 MCG (2000 UT) tablet, Take 1 tablet by mouth Daily., Disp: , Rfl:     coenzyme Q10 100 MG capsule, Take 1 capsule by mouth Daily., Disp: , Rfl:     Dulera 100-5 MCG/ACT inhaler, USE 2 INHALATIONS TWICE A DAY, Disp: 39 g, Rfl: 3    DULoxetine (CYMBALTA) 30 MG capsule, TAKE 1 CAPSULE DAILY, Disp: 90 capsule, Rfl: 3    FLAXSEED, LINSEED, PO, Take  by mouth., Disp: , Rfl:     Gluc-Chonn-MSM-Boswellia-Vit D (GLUCOSAMINE CHONDROITIN COMPLX) tablet, Take  by mouth., Disp: , Rfl:     hydroCHLOROthiazide (HYDRODIURIL) 12.5 MG tablet, TAKE 1 TABLET DAILY, Disp: 90 tablet, Rfl: 3    HYDROcodone-acetaminophen (NORCO)  MG per tablet, Take 1 tablet by mouth Every 6 (Six) Hours As Needed for Moderate Pain., Disp: 120 tablet, Rfl: 0    Ipratropium-Albuterol (COMBIVENT IN), Inhale 2 puffs., Disp: , Rfl:     Magnesium 250 MG tablet, Take 2 tablets by mouth Daily., Disp: , Rfl:     " Omega-3 Fatty Acids (FISH OIL) 1000 MG capsule capsule, Take  by mouth., Disp: , Rfl:     omeprazole (priLOSEC) 40 MG capsule, TAKE 1 CAPSULE DAILY, Disp: 90 capsule, Rfl: 3    rosuvastatin (CRESTOR) 20 MG tablet, TAKE 1 TABLET DAILY, Disp: 90 tablet, Rfl: 3    spironolactone (ALDACTONE) 50 MG tablet, Take 1 tablet by mouth Daily., Disp: 90 tablet, Rfl: 3    traZODone (DESYREL) 50 MG tablet, Take 1 tab as needed for sleep on night of sleep study., Disp: 1 tablet, Rfl: 0    vitamin B-12 (CYANOCOBALAMIN) 1000 MCG tablet, Take 1 tablet by mouth Daily., Disp: , Rfl:     vitamin C (ASCORBIC ACID) 250 MG tablet, Take 4 tablets by mouth Daily., Disp: , Rfl:     Zinc 50 MG tablet, Take  by mouth., Disp: , Rfl:     Vital Signs:    Vitals:    12/08/23 0945   Pulse: 101   SpO2: 96%   Weight: 87.1 kg (192 lb)   Height: 167.6 cm (66\")      Body mass index is 30.99 kg/m².  Neck Circumference: 17 inches      REVIEW OF SYSTEMS.  Full review of systems available on the intake form which is scanned in the media tab.  The relevant positive are noted below  Daytime excessive sleepiness with Inez Sleepiness Scale :Total score: 4   Snoring  Fatigue      Physical exam:  Vitals:    12/08/23 0945   Pulse: 101   SpO2: 96%   Weight: 87.1 kg (192 lb)   Height: 167.6 cm (66\")    Body mass index is 30.99 kg/m². Neck Circumference: 17 inches  HEENT: Head is atraumatic, normocephalic  Eyes: pupils are round equal and reacting to light and accommodation, conjunctiva normal  Throat: tongue normal  NECK:Neck Circumference: 17 inches  RESPIRATORY SYSTEM: Regular respirations  CARDIOVASULAR SYSTEM: Regular rate  EXTREMITES: No cyanosis, clubbing  NEUROLOGICAL SYSTEM: Oriented x 3, no gross motor defects, gait normal      Impression:  1. Sleep apnea, unspecified type    2. Hypersomnia    3. Non-restorative sleep    4. Snoring    5. Obesity (BMI 30-39.9)    6. Other emphysema        Plan:    Good sleep hygiene measures should be maintained.  Weight " loss would be beneficial in this patient who is obese BMI 30.99.    I discussed the pathophysiology of obstructive sleep apnea with the patient.  We discussed the adverse outcomes associated with untreated sleep-disordered breathing.  We discussed treatment modalities of obstructive sleep apnea including CPAP device as well as inspire device. Sleep study will be scheduled to assess severity of sleep disorder breathing.  Weight loss will be strongly beneficial in order to reduce the severity of sleep-disordered breathing.   Caution during activities that require prolonged concentration is strongly advised.  Patient will be notified of sleep study results after sleep study is completed.  The patient is not opposed to treatment with CPAP device if we confirm significant obstructive sleep apnea on polysomnography.  May need pressures adjusted may need to consider BiPAP or BiPAP ST.  May need to consider oxygen in the future given emphysema history.  Prescription for trazodone was provided to bring to the Sleep lab to improve sleep efficiency.      Thank you for allowing me to participate in your patient's care.    Mike Leon MD  Sleep Medicine  12/08/23  10:31 EST

## 2023-12-11 RX ORDER — ALLOPURINOL 100 MG/1
TABLET ORAL
Qty: 90 TABLET | Refills: 3 | Status: SHIPPED | OUTPATIENT
Start: 2023-12-11

## 2023-12-11 RX ORDER — SPIRONOLACTONE 50 MG/1
50 TABLET, FILM COATED ORAL DAILY
Qty: 90 TABLET | Refills: 3 | Status: SHIPPED | OUTPATIENT
Start: 2023-12-11

## 2023-12-11 RX ORDER — HYDROCHLOROTHIAZIDE 12.5 MG/1
TABLET ORAL
Qty: 90 TABLET | Refills: 3 | Status: SHIPPED | OUTPATIENT
Start: 2023-12-11

## 2023-12-13 DIAGNOSIS — M16.9 HIP ARTHROSIS: ICD-10-CM

## 2023-12-13 DIAGNOSIS — M48.061 DEGENERATIVE LUMBAR SPINAL STENOSIS: ICD-10-CM

## 2023-12-14 NOTE — TELEPHONE ENCOUNTER
[facility] 5/2/23  [facility] 5/2/23  Rx Refill Note  Requested Prescriptions     Pending Prescriptions Disp Refills    HYDROcodone-acetaminophen (NORCO)  MG per tablet 120 tablet 0     Sig: Take 1 tablet by mouth Every 6 (Six) Hours As Needed for Moderate Pain.      Last office visit with prescribing clinician: 9/20/2023   Last telemedicine visit with prescribing clinician: Visit date not found   Next office visit with prescribing clinician: 12/22/2023                         Would you like a call back once the refill request has been completed: [] Yes [] No    If the office needs to give you a call back, can they leave a voicemail: [] Yes [] No    Torie Diaz CMA/LMR  12/14/23, 10:32 EST

## 2023-12-15 RX ORDER — HYDROCODONE BITARTRATE AND ACETAMINOPHEN 10; 325 MG/1; MG/1
1 TABLET ORAL EVERY 6 HOURS PRN
Qty: 120 TABLET | Refills: 0 | Status: SHIPPED | OUTPATIENT
Start: 2023-12-15

## 2023-12-22 ENCOUNTER — OFFICE VISIT (OUTPATIENT)
Dept: FAMILY MEDICINE CLINIC | Facility: CLINIC | Age: 83
End: 2023-12-22
Payer: MEDICARE

## 2023-12-22 VITALS
TEMPERATURE: 97.6 F | DIASTOLIC BLOOD PRESSURE: 70 MMHG | HEIGHT: 66 IN | WEIGHT: 192 LBS | BODY MASS INDEX: 30.86 KG/M2 | HEART RATE: 94 BPM | SYSTOLIC BLOOD PRESSURE: 110 MMHG | OXYGEN SATURATION: 98 %

## 2023-12-22 DIAGNOSIS — Z87.39 HISTORY OF RHEUMATOID ARTHRITIS: ICD-10-CM

## 2023-12-22 DIAGNOSIS — M48.061 DEGENERATIVE LUMBAR SPINAL STENOSIS: Primary | ICD-10-CM

## 2023-12-22 DIAGNOSIS — M16.9 HIP ARTHROSIS: ICD-10-CM

## 2023-12-22 DIAGNOSIS — I10 PRIMARY HYPERTENSION: ICD-10-CM

## 2023-12-22 DIAGNOSIS — E78.2 MIXED HYPERLIPIDEMIA: ICD-10-CM

## 2023-12-22 DIAGNOSIS — F41.8 ANXIETY WITH DEPRESSION: ICD-10-CM

## 2023-12-22 DIAGNOSIS — R53.82 CHRONIC FATIGUE: ICD-10-CM

## 2023-12-22 DIAGNOSIS — I87.8 CHRONIC VENOUS STASIS: ICD-10-CM

## 2023-12-22 DIAGNOSIS — M1A.00X0 CHRONIC GOUTY ARTHRITIS: ICD-10-CM

## 2023-12-22 RX ORDER — HYDROCODONE BITARTRATE AND ACETAMINOPHEN 10; 325 MG/1; MG/1
1 TABLET ORAL EVERY 6 HOURS PRN
Qty: 120 TABLET | Refills: 0 | Status: CANCELLED | OUTPATIENT
Start: 2023-12-22

## 2023-12-22 RX ORDER — ROSUVASTATIN CALCIUM 40 MG/1
40 TABLET, COATED ORAL DAILY
Status: CANCELLED | OUTPATIENT
Start: 2023-12-22

## 2023-12-22 NOTE — PATIENT INSTRUCTIONS
NEEDS TO TAKE CRESTOR 20 MG EVERY DAY    May start over-the-counter vitamin B12 1000 mcg sublingual daily    Follow-up with rheumatologist, nephrologist, and sleep medicine doctors as planned    Referral to new rheumatologist

## 2023-12-22 NOTE — PROGRESS NOTES
Chief Complaint  Degenerative lumbar spinal stenosis (3 months check up not fasting), Chronic Kidney Disease, and Hyperlipidemia    3-month follow-up on chronic lumbar spinal stenosis, hip arthritis, chronic fatigue, hyperlipidemia, HTN and CKD    LOV with me in September for chronic med refills with labs, uncontrolled fatigue  -- Routine labs all done, stable.  No med changes made.  -- Basic fatigue lab workup ordered, all WNL except for slightly low B12, TSH was never completed.  -- Referred back to sleep medicine for reevaluation given complaints of daytime fatigue and noncompliance with CPAP, equipment outdated.  -- Referred back to his rheumatologist/Dr. Allen due to uncontrolled pain, history of rheumatoid arthritis    Overall, doing okay.  He has seen sleep medicine, planning repeat sleep study next month.  Being evaluated for new equipment.  Although, he was unable to see his old rheumatologist/Dr. Allen due to them not accepting his insurance?  No supplemental plan?    No recent falls or ER visits.  Still tries to maintain a healthy diet, but no exercise due to immobility.  Weight remains stable.  Mood is fair.  Sleep is adequate.  No chest pain, SOA, or increased edema.    No new complaints or concerns, besides his chronic fatigue and chronic pain.  Nonfasting today.  Maintains regular follow-up with multiple specialist, including podiatrist, nephrologist, and sleep medicine.  Compliant with and tolerates all medications without side effects, however only taking Crestor 20 mg every other day??    Still relies on Norco 10 mg a quantity of 4/day for significant lumbar spinal stenosis and significant hip arthrosis.  Frustrated with the national backorder system/government excetra.  Recently went to a mail order service, this seems to be going better.    Review of Systems   Constitutional:  Negative for fever and unexpected weight change.   Respiratory:  Negative for cough and shortness of breath.   "  Cardiovascular:  Negative for chest pain.        Subjective          Kike Rubio presents to [facility]    Objective   Vital Signs:   Vitals:    12/22/23 1131   BP: 110/70   BP Location: Right arm   Patient Position: Sitting   Cuff Size: Adult   Pulse: 94   Temp: 97.6 °F (36.4 °C)   SpO2: 98%   Weight: 87.1 kg (192 lb)   Height: 167.6 cm (66\")      Body mass index is 30.99 kg/m².   Physical Exam  Vitals and nursing note reviewed.   Constitutional:       Appearance: Normal appearance. He is well-developed.   HENT:      Head: Normocephalic and atraumatic.      Nose: Nose normal.   Eyes:      Conjunctiva/sclera: Conjunctivae normal.      Pupils: Pupils are equal, round, and reactive to light.   Neck:      Thyroid: No thyromegaly.   Cardiovascular:      Rate and Rhythm: Normal rate and regular rhythm.      Heart sounds: Normal heart sounds. No murmur heard.  Pulmonary:      Effort: Pulmonary effort is normal.      Breath sounds: Normal breath sounds.   Abdominal:      General: Abdomen is flat. Bowel sounds are normal. There is no distension.      Palpations: Abdomen is soft. There is no hepatomegaly, splenomegaly or mass.      Tenderness: There is no abdominal tenderness. There is no guarding or rebound.      Hernia: No hernia is present.   Musculoskeletal:         General: Normal range of motion.      Cervical back: Normal range of motion and neck supple.      Right lower leg: No edema.      Left lower leg: No edema.      Comments: Bilateral lower extremities --brawny appearance, unchanged, trace edema only   Lymphadenopathy:      Cervical: No cervical adenopathy.   Skin:     General: Skin is warm.   Neurological:      General: No focal deficit present.      Mental Status: He is alert.   Psychiatric:         Mood and Affect: Mood normal.         Behavior: Behavior normal.         Thought Content: Thought content normal.         Judgment: Judgment normal.        Result Review : "     Common labs          1/31/2023    12:03 5/2/2023    12:06   Common Labs   Glucose 93     BUN 26     Creatinine 1.70     Sodium 139     Potassium 5.0     Chloride 102     Calcium 9.9     Total Protein 6.8     Albumin 4.7     Total Bilirubin 0.4     Alkaline Phosphatase 108     AST (SGOT) 21     ALT (SGPT) 7     Total Cholesterol 161     Triglycerides 102     HDL Cholesterol 53     LDL Cholesterol  89     PSA  <0.014              Lab Results   Component Value Date    OKYY30ES 61.8 09/15/2021    FOLATE >20.0 02/12/2020        SCANNED - LABS (09/26/2023) --- LDL 89, GFR 36, B12 319, vitamin D and TSH were not done despite being ordered           Assessment and Plan    Diagnoses and all orders for this visit:    1. Degenerative lumbar spinal stenosis (Primary) --stable.  Urine Tox screen and controlled substance contract/May 2023, appropriate.  Kunal report reviewed, appropriate.  Did discuss again, his reliance on high-dose Norco especially given elderly age.  Would like to decrease dosage to 7.5 at some point, however, he is strongly opposed to that idea at this time.  Currently waiting on his mail order service for hydrocodone.  Thus, plan to continue Norco 10 mg a quantity of 4/day.  Recently filled by mail order service.    2. Hip arthrosis -stable, same plan as above  Intolerant to NSAIDs due to CKD  -     Ambulatory Referral to Rheumatology    3. Mixed hyperlipidemia --slightly uncontrolled  Given an LDL of 89 and known history of prior CEA, goal LDL needs to be less than 70.  Needs to increase his Crestor to 20 mg EVERY DAY, not every other day  Needs low-carb/low calorie/low cholesterol diet and increase cardio exercise to greater than 150 minutes weekly     4. Primary hypertension --controlled  No change with HCTZ or Aldactone    5. Chronic venous stasis --stable, no change with diuretics    6. Anxiety with depression --fair control  Remains on low-dose Cymbalta at 30 mg daily, may consider increasing to  60 mg?    7. Chronic fatigue --stable  Currently undergoing reevaluation with sleep medicine, planning repeat sleep study next month  Would recommend starting OTC B12 sublingual daily  Plan to recheck TSH at next visit in 3 months when rechecking lipids, apparently it was not done at last lab draw/external location despite orders being in the computer    8. History of rheumatoid arthritis --needs new referral to new rheumatologist  Dr. Allen no longer excepting his insurance  -     Ambulatory Referral to Rheumatology    9. Chronic gouty arthritis  -     Ambulatory Referral to Rheumatology        Follow Up   Return in about 3 months (around 3/22/2024) for Medicare Wellness.  Patient was given instructions and counseling regarding his condition or for health maintenance advice. Please see specific information pulled into the AVS if appropriate.

## 2024-01-08 RX ORDER — DULOXETIN HYDROCHLORIDE 30 MG/1
CAPSULE, DELAYED RELEASE ORAL
Qty: 90 CAPSULE | Refills: 3 | Status: SHIPPED | OUTPATIENT
Start: 2024-01-08

## 2024-01-19 ENCOUNTER — HOSPITAL ENCOUNTER (OUTPATIENT)
Dept: SLEEP MEDICINE | Facility: HOSPITAL | Age: 84
End: 2024-01-19
Payer: MEDICARE

## 2024-01-19 VITALS — BODY MASS INDEX: 30.86 KG/M2 | WEIGHT: 192 LBS | HEIGHT: 66 IN

## 2024-01-19 DIAGNOSIS — E66.9 OBESITY (BMI 30-39.9): ICD-10-CM

## 2024-01-19 DIAGNOSIS — G47.30 SLEEP APNEA, UNSPECIFIED TYPE: ICD-10-CM

## 2024-01-19 DIAGNOSIS — G47.8 NON-RESTORATIVE SLEEP: ICD-10-CM

## 2024-01-19 DIAGNOSIS — J43.8 OTHER EMPHYSEMA: ICD-10-CM

## 2024-01-19 DIAGNOSIS — G47.10 HYPERSOMNIA: ICD-10-CM

## 2024-01-19 DIAGNOSIS — R06.83 SNORING: ICD-10-CM

## 2024-01-19 PROCEDURE — 95811 POLYSOM 6/>YRS CPAP 4/> PARM: CPT

## 2024-01-22 ENCOUNTER — TELEPHONE (OUTPATIENT)
Dept: FAMILY MEDICINE CLINIC | Facility: CLINIC | Age: 84
End: 2024-01-22
Payer: MEDICARE

## 2024-01-22 NOTE — TELEPHONE ENCOUNTER
----- Message from Kike Rubio sent at 1/22/2024  1:00 PM EST -----  Regarding: Norco  Contact: [phone]  Dr. Willis,   I called [facility] and they told me (no norco till March, maybe.)  Would it be possible to send a script for me and Melinda to the [facility] on [address]? I don't know if they have them or not but I don't know what to do!!!!  Please let me know what you decide.    Thanks   Kkie

## 2024-01-23 ENCOUNTER — TELEPHONE (OUTPATIENT)
Dept: FAMILY MEDICINE CLINIC | Facility: CLINIC | Age: 84
End: 2024-01-23
Payer: MEDICARE

## 2024-01-23 DIAGNOSIS — M16.9 HIP ARTHROSIS: ICD-10-CM

## 2024-01-23 DIAGNOSIS — M48.061 DEGENERATIVE LUMBAR SPINAL STENOSIS: ICD-10-CM

## 2024-01-23 NOTE — TELEPHONE ENCOUNTER
Rx Refill Note  Requested Prescriptions     Pending Prescriptions Disp Refills    HYDROcodone-acetaminophen (NORCO)  MG per tablet 120 tablet 0     Sig: Take 1 tablet by mouth Every 6 (Six) Hours As Needed for Moderate Pain.      Last office visit with prescribing clinician: 12/22/2023   Last telemedicine visit with prescribing clinician: Visit date not found   Next office visit with prescribing clinician: 3/29/2024                         Would you like a call back once the refill request has been completed: [] Yes [] No    If the office needs to give you a call back, can they leave a voicemail: [] Yes [] No    Donte Ramires CMA/BRIAN  01/23/24, 13:33 EST

## 2024-01-23 NOTE — TELEPHONE ENCOUNTER
----- Message from Kike Rubio sent at 1/23/2024 12:58 PM EST -----  Regarding: Norco  Contact: [phone]  Dr. Morin,  I checked around and I think the [facility]  on [address] is as good as any and better then some.  If you would send a script for Melinda and me, it would be appreciated a lot.  Thanks   Kike

## 2024-01-24 RX ORDER — HYDROCODONE BITARTRATE AND ACETAMINOPHEN 10; 325 MG/1; MG/1
1 TABLET ORAL EVERY 6 HOURS PRN
Qty: 120 TABLET | Refills: 0 | Status: SHIPPED | OUTPATIENT
Start: 2024-01-24

## 2024-01-26 ENCOUNTER — TELEPHONE (OUTPATIENT)
Dept: SLEEP MEDICINE | Facility: HOSPITAL | Age: 84
End: 2024-01-26
Payer: MEDICARE

## 2024-01-26 DIAGNOSIS — G47.33 OBSTRUCTIVE SLEEP APNEA: Primary | ICD-10-CM

## 2024-01-27 DIAGNOSIS — M48.061 DEGENERATIVE LUMBAR SPINAL STENOSIS: ICD-10-CM

## 2024-01-27 DIAGNOSIS — M16.9 HIP ARTHROSIS: ICD-10-CM

## 2024-01-29 NOTE — TELEPHONE ENCOUNTER
Rx Refill Note  Requested Prescriptions     Pending Prescriptions Disp Refills    HYDROcodone-acetaminophen (NORCO)  MG per tablet 120 tablet 0     Sig: Take 1 tablet by mouth Every 6 (Six) Hours As Needed for Moderate Pain.      Last office visit with prescribing clinician: 12/22/2023   Last telemedicine visit with prescribing clinician: Visit date not found   Next office visit with prescribing clinician: 3/29/2024                         Would you like a call back once the refill request has been completed: [] Yes [] No    If the office needs to give you a call back, can they leave a voicemail: [] Yes [] No    Donte Ramires CMA/LMR  01/29/24, 08:14 EST

## 2024-01-31 RX ORDER — HYDROCODONE BITARTRATE AND ACETAMINOPHEN 10; 325 MG/1; MG/1
1 TABLET ORAL EVERY 6 HOURS PRN
Qty: 120 TABLET | Refills: 0 | Status: SHIPPED | OUTPATIENT
Start: 2024-01-31

## 2024-02-02 ENCOUNTER — TELEPHONE (OUTPATIENT)
Dept: FAMILY MEDICINE CLINIC | Facility: CLINIC | Age: 84
End: 2024-02-02
Payer: MEDICARE

## 2024-02-02 DIAGNOSIS — L60.9 NAIL PROBLEM: Primary | ICD-10-CM

## 2024-02-02 NOTE — TELEPHONE ENCOUNTER
Maryanne (not sure of spelling) with Dr. Brewer's office called to request an insurance referral be completed and faxed. Patient is there for an appointment to have his toenails cut and he has [employer] Medicare which requires a referral.    [phone] ext [phone] phone  [phone] fax

## 2024-02-07 DIAGNOSIS — M16.9 HIP ARTHROSIS: ICD-10-CM

## 2024-02-07 DIAGNOSIS — M48.061 DEGENERATIVE LUMBAR SPINAL STENOSIS: ICD-10-CM

## 2024-02-07 RX ORDER — HYDROCODONE BITARTRATE AND ACETAMINOPHEN 10; 325 MG/1; MG/1
1 TABLET ORAL EVERY 6 HOURS PRN
Qty: 120 TABLET | Refills: 0 | Status: SHIPPED | OUTPATIENT
Start: 2024-02-07

## 2024-02-07 NOTE — TELEPHONE ENCOUNTER
Rx Refill Note  Requested Prescriptions     Pending Prescriptions Disp Refills    HYDROcodone-acetaminophen (NORCO)  MG per tablet 120 tablet 0     Sig: Take 1 tablet by mouth Every 6 (Six) Hours As Needed for Moderate Pain.      Last office visit with prescribing clinician: 12/22/2023   Last telemedicine visit with prescribing clinician: Visit date not found   Next office visit with prescribing clinician: 3/29/2024                         Would you like a call back once the refill request has been completed: [] Yes [] No    If the office needs to give you a call back, can they leave a voicemail: [] Yes [] No    Donte Ramires CMA/LMR  02/07/24, 07:43 EST

## 2024-02-07 NOTE — TELEPHONE ENCOUNTER
----- Message from Kike Rubio sent at 2/6/2024  8:27 PM EST -----  Regarding: still no [place].  Contact: [phone]  Dr. Morin ,  I still don't have my norco,  I just checked at [facility] and they don't have any scrip for me.   Melinda received hers from [facility] on [address]".  Would please send a scrip for norco  mg. to the [facility]? I really don't know what is going on  Sorry about this mess but I am at a loss.   Thanks  Kike

## 2024-03-02 DIAGNOSIS — M16.9 HIP ARTHROSIS: ICD-10-CM

## 2024-03-02 DIAGNOSIS — M48.061 DEGENERATIVE LUMBAR SPINAL STENOSIS: ICD-10-CM

## 2024-03-04 NOTE — TELEPHONE ENCOUNTER
Rx Refill Note  Requested Prescriptions     Pending Prescriptions Disp Refills    HYDROcodone-acetaminophen (NORCO)  MG per tablet 120 tablet 0     Sig: Take 1 tablet by mouth Every 6 (Six) Hours As Needed for Moderate Pain.      Last office visit with prescribing clinician: 12/22/2023   Last telemedicine visit with prescribing clinician: Visit date not found   Next office visit with prescribing clinician: 3/29/2024                         Would you like a call back once the refill request has been completed: [] Yes [] No    If the office needs to give you a call back, can they leave a voicemail: [] Yes [] No    Donte Ramires CMA/LMR  03/04/24, 07:51 EST

## 2024-03-06 RX ORDER — HYDROCODONE BITARTRATE AND ACETAMINOPHEN 10; 325 MG/1; MG/1
1 TABLET ORAL EVERY 6 HOURS PRN
Qty: 120 TABLET | Refills: 0 | Status: SHIPPED | OUTPATIENT
Start: 2024-03-06

## 2024-03-19 NOTE — PATIENT INSTRUCTIONS
----- Message from Abby Wolf DO sent at 3/19/2024  6:03 AM CDT -----  Left knee - small bone spurs present  Moderate joint effusion present  Recommend MRI of the left knee    Left ankle - small bony fragments present along the medial aspect probably related to prior injury.  No acute injury.  There is prominent dorsal talar osteophyte which may result in an anterior ankle impingement.  Recommend referral to podiatry   Continue current treatment plan.     Further recommendations to follow based on labs    Needs to avoid all NSAIDs

## 2024-03-29 ENCOUNTER — OFFICE VISIT (OUTPATIENT)
Dept: FAMILY MEDICINE CLINIC | Facility: CLINIC | Age: 84
End: 2024-03-29
Payer: MEDICARE

## 2024-03-29 VITALS
TEMPERATURE: 98 F | WEIGHT: 187.4 LBS | HEART RATE: 88 BPM | BODY MASS INDEX: 30.12 KG/M2 | DIASTOLIC BLOOD PRESSURE: 60 MMHG | SYSTOLIC BLOOD PRESSURE: 136 MMHG | OXYGEN SATURATION: 96 % | HEIGHT: 66 IN

## 2024-03-29 DIAGNOSIS — G47.33 OBSTRUCTIVE SLEEP APNEA SYNDROME: ICD-10-CM

## 2024-03-29 DIAGNOSIS — R53.82 CHRONIC FATIGUE: ICD-10-CM

## 2024-03-29 DIAGNOSIS — N18.32 STAGE 3B CHRONIC KIDNEY DISEASE: ICD-10-CM

## 2024-03-29 DIAGNOSIS — E78.2 MIXED HYPERLIPIDEMIA: ICD-10-CM

## 2024-03-29 DIAGNOSIS — M1A.00X0 CHRONIC GOUTY ARTHRITIS: ICD-10-CM

## 2024-03-29 DIAGNOSIS — F41.8 ANXIETY WITH DEPRESSION: ICD-10-CM

## 2024-03-29 DIAGNOSIS — C61 PROSTATE CANCER: ICD-10-CM

## 2024-03-29 DIAGNOSIS — E55.9 VITAMIN D DEFICIENCY: ICD-10-CM

## 2024-03-29 DIAGNOSIS — I10 PRIMARY HYPERTENSION: ICD-10-CM

## 2024-03-29 DIAGNOSIS — Z00.00 ENCOUNTER FOR MEDICARE ANNUAL WELLNESS EXAM: Primary | ICD-10-CM

## 2024-03-29 DIAGNOSIS — M48.061 DEGENERATIVE LUMBAR SPINAL STENOSIS: ICD-10-CM

## 2024-03-29 DIAGNOSIS — Z98.890 HISTORY OF CEA (CAROTID ENDARTERECTOMY): ICD-10-CM

## 2024-03-29 DIAGNOSIS — J43.8 OTHER EMPHYSEMA: ICD-10-CM

## 2024-03-29 PROBLEM — I65.29 STENOSIS OF CAROTID ARTERY: Status: ACTIVE | Noted: 2024-03-29

## 2024-03-29 PROBLEM — D64.9 MILD ANEMIA: Status: ACTIVE | Noted: 2024-03-29

## 2024-03-29 PROBLEM — S61.011A LACERATION OF RIGHT THUMB WITHOUT FOREIGN BODY WITHOUT DAMAGE TO NAIL: Status: RESOLVED | Noted: 2021-06-09 | Resolved: 2024-03-29

## 2024-03-29 PROBLEM — Z87.39 HISTORY OF RHEUMATOID ARTHRITIS: Status: RESOLVED | Noted: 2023-09-20 | Resolved: 2024-03-29

## 2024-03-29 LAB
25(OH)D3+25(OH)D2 SERPL-MCNC: 58.8 NG/ML (ref 30–100)
BASOPHILS # BLD AUTO: 0.05 10*3/MM3 (ref 0–0.2)
BASOPHILS NFR BLD AUTO: 0.7 % (ref 0–1.5)
CHOLEST SERPL-MCNC: 154 MG/DL (ref 0–200)
EOSINOPHIL # BLD AUTO: 0.26 10*3/MM3 (ref 0–0.4)
EOSINOPHIL NFR BLD AUTO: 3.8 % (ref 0.3–6.2)
ERYTHROCYTE [DISTWIDTH] IN BLOOD BY AUTOMATED COUNT: 12.7 % (ref 12.3–15.4)
FOLATE SERPL-MCNC: 18.7 NG/ML (ref 4.78–24.2)
HCT VFR BLD AUTO: 37.2 % (ref 37.5–51)
HDLC SERPL-MCNC: 54 MG/DL (ref 40–60)
HGB BLD-MCNC: 12.2 G/DL (ref 13–17.7)
IMM GRANULOCYTES # BLD AUTO: 0.02 10*3/MM3 (ref 0–0.05)
IMM GRANULOCYTES NFR BLD AUTO: 0.3 % (ref 0–0.5)
LDLC SERPL CALC-MCNC: 79 MG/DL (ref 0–100)
LDLC/HDLC SERPL: 1.4 {RATIO}
LYMPHOCYTES # BLD AUTO: 1.34 10*3/MM3 (ref 0.7–3.1)
LYMPHOCYTES NFR BLD AUTO: 19.4 % (ref 19.6–45.3)
MCH RBC QN AUTO: 32 PG (ref 26.6–33)
MCHC RBC AUTO-ENTMCNC: 32.8 G/DL (ref 31.5–35.7)
MCV RBC AUTO: 97.6 FL (ref 79–97)
MONOCYTES # BLD AUTO: 0.63 10*3/MM3 (ref 0.1–0.9)
MONOCYTES NFR BLD AUTO: 9.1 % (ref 5–12)
NEUTROPHILS # BLD AUTO: 4.59 10*3/MM3 (ref 1.7–7)
NEUTROPHILS NFR BLD AUTO: 66.7 % (ref 42.7–76)
NRBC BLD AUTO-RTO: 0 /100 WBC (ref 0–0.2)
PLATELET # BLD AUTO: 200 10*3/MM3 (ref 140–450)
RBC # BLD AUTO: 3.81 10*6/MM3 (ref 4.14–5.8)
TRIGL SERPL-MCNC: 121 MG/DL (ref 0–150)
TSH SERPL DL<=0.005 MIU/L-ACNC: 1.69 UIU/ML (ref 0.27–4.2)
VIT B12 SERPL-MCNC: 933 PG/ML (ref 211–946)
VLDLC SERPL CALC-MCNC: 21 MG/DL (ref 5–40)
WBC # BLD AUTO: 6.89 10*3/MM3 (ref 3.4–10.8)

## 2024-03-29 RX ORDER — HYDROCODONE BITARTRATE AND ACETAMINOPHEN 10; 325 MG/1; MG/1
1 TABLET ORAL EVERY 6 HOURS PRN
Qty: 120 TABLET | Refills: 0 | Status: SHIPPED | OUTPATIENT
Start: 2024-04-06

## 2024-03-29 RX ORDER — ALLOPURINOL 100 MG/1
100 TABLET ORAL DAILY
Qty: 90 TABLET | Refills: 3 | Status: SHIPPED | OUTPATIENT
Start: 2024-03-29

## 2024-03-29 RX ORDER — ROSUVASTATIN CALCIUM 20 MG/1
20 TABLET, COATED ORAL DAILY
Qty: 90 TABLET | Refills: 3 | Status: SHIPPED | OUTPATIENT
Start: 2024-03-29

## 2024-03-29 RX ORDER — HYDROCHLOROTHIAZIDE 12.5 MG/1
12.5 TABLET ORAL DAILY
Qty: 90 TABLET | Refills: 3 | Status: SHIPPED | OUTPATIENT
Start: 2024-03-29

## 2024-03-29 RX ORDER — MOMETASONE FUROATE AND FORMOTEROL FUMARATE DIHYDRATE 100; 5 UG/1; UG/1
2 AEROSOL RESPIRATORY (INHALATION) 2 TIMES DAILY
Qty: 39 G | Refills: 3 | Status: SHIPPED | OUTPATIENT
Start: 2024-03-29

## 2024-03-29 RX ORDER — DULOXETIN HYDROCHLORIDE 30 MG/1
30 CAPSULE, DELAYED RELEASE ORAL DAILY
Qty: 90 CAPSULE | Refills: 3 | Status: CANCELLED | OUTPATIENT
Start: 2024-03-29

## 2024-03-29 RX ORDER — TURMERIC ROOT EXTRACT 500 MG
1 TABLET ORAL DAILY
COMMUNITY

## 2024-03-29 RX ORDER — DULOXETIN HYDROCHLORIDE 30 MG/1
30 CAPSULE, DELAYED RELEASE ORAL DAILY
Qty: 90 CAPSULE | Refills: 3 | Status: SHIPPED | OUTPATIENT
Start: 2024-03-29

## 2024-03-29 RX ORDER — SPIRONOLACTONE 50 MG/1
50 TABLET, FILM COATED ORAL DAILY
Qty: 90 TABLET | Refills: 3 | Status: SHIPPED | OUTPATIENT
Start: 2024-03-29

## 2024-03-29 RX ORDER — OMEPRAZOLE 40 MG/1
40 CAPSULE, DELAYED RELEASE ORAL DAILY
Qty: 90 CAPSULE | Refills: 3 | Status: SHIPPED | OUTPATIENT
Start: 2024-03-29

## 2024-03-29 NOTE — PROGRESS NOTES
The ABCs of the Annual Wellness Visit  Subsequent Medicare Wellness Visit    Chief Complaint   Patient presents with    Medicare Wellness-subsequent     Yearly wellness with fasting labs    Hyperlipidemia    Hypertension    Chronic Kidney Disease    Osteoarthritis    DDD lumbar spine with spinal stenosis    Anxiety    Depression    Rheumatoid Arthritis      Subjective    History of Present Illness:  Kike Rubio is a 83 y.o. male who presents for a Subsequent Medicare Wellness Visit.    NEEDS ANNUAL WELLNESS  And  3-month follow-up on lumbar spinal stenosis, hyperlipidemia, arthritis/gout, and chronic fatigue  And  6-month follow-up on HTN, COPD, FRAN/depression, chronic venous stasis    Last visit with me in December 2023 for chronic med refills, uncontrolled lipids, uncontrolled JRA and gout, and uncontrolled chronic fatigue  Multiple things done at last visit.  -- Crestor was increased to 20 mg daily due to history of CEA and lipids not at goal  -- Referred back to rheumatologist, lost to follow-up with history of JRA, gout, and uncontrolled arthralgias.  Due to insurance issues? discussed possibly increasing dose of Cymbalta?  -- referred back to sleep medicine for reevaluation of TONYA, given chronic fatigue  -- OTC vitamin B12 added    He was compliant with the above recommendations.  Increased Crestor and is tolerating without increased arthralgias or myalgias.  Started sublingual B12.    Also, recently seen rheumatologist/Dr. Wood approximately 2 weeks ago on 3/12/2024, records reviewed.  Updated labs and x-rays.  Rheumatoid stable.  Primarily treating for right shoulder and right elbow arthralgias.  Considering an MRI.?  No adjustment with meds.  Has follow-up.    Overall, doing better.  Sleep and fatigue have improved since getting new equipment for CPAP and starting B12.  Mood has improved.  No recent falls or ER visits.  Still tries to maintain a healthy diet but no exercise due to immobility.  No chest  pain, SOA, or increased edema.      No new complaints or concerns.  Needs all refills and due for repeat fasting labs  Maintains regular follow-up with multiple specialist, including podiatrist, nephrologist, and sleep medicine.  Compliant with and tolerates all medications without side effects.  Still relies on Norco 10 mg a quantity of 4/day for significant lumbar spinal stenosis and significant hip arthrosis.        Routine health maintenance/screening test:  Prostate Screening --- May 2023, history of prostate cancer, still requests screening  Colorectal Screen --- no further screening, aged out  Vaccines --- due for multiple vaccinations, but declines  Smoking/ETOH Status --- non-smoker.  Social alcohol only  Dentist, Eye Exam, Derm --- obtains regular dental visits, overdue for eye exam.  No derm  Diet/Exercise --- tries to maintain a healthy diet, no cardio exercise due to immobility  Pertinent FH --- CAD/parents, negative for colon cancer, prostate cancer    The following portions of the patient's history were reviewed and   updated as appropriate: allergies, current medications, past family history, past medical history, past social history, past surgical history, and problem list.    Compared to one year ago, the patient feels his physical   health is the same.    Compared to one year ago, the patient feels his mental   health is better.    Recent Hospitalizations:  He was not admitted to the hospital during the last year.       Current Medical Providers:  Patient Care Team:  Kina Morin MD as PCP - General (Family Medicine)    Outpatient Medications Prior to Visit   Medication Sig Dispense Refill    aspirin 325 MG tablet Take 1 tablet by mouth Daily.      Cholecalciferol (VITAMIN D) 50 MCG (2000 UT) tablet Take 1 tablet by mouth Daily.      coenzyme Q10 100 MG capsule Take 1 capsule by mouth Daily.      FLAXSEED, LINSEED, PO Take  by mouth.      Gluc-Chonn-MSM-Boswellia-Vit D (GLUCOSAMINE  CHONDROITIN COMPLX) tablet Take  by mouth.      Ipratropium-Albuterol (COMBIVENT IN) Inhale 2 puffs.      Omega-3 Fatty Acids (FISH OIL) 1000 MG capsule capsule Take  by mouth.      Turmeric 500 MG tablet Take 1 each by mouth Daily.      vitamin B-12 (CYANOCOBALAMIN) 1000 MCG tablet Take 1 tablet by mouth Daily.      vitamin C (ASCORBIC ACID) 250 MG tablet Take 4 tablets by mouth Daily.      Zinc 50 MG tablet Take  by mouth.      allopurinol (ZYLOPRIM) 100 MG tablet TAKE 1 TABLET DAILY 90 tablet 3    Dulera 100-5 MCG/ACT inhaler USE 2 INHALATIONS TWICE A DAY 39 g 3    DULoxetine (CYMBALTA) 30 MG capsule TAKE 1 CAPSULE DAILY 90 capsule 3    hydroCHLOROthiazide (HYDRODIURIL) 12.5 MG tablet TAKE 1 TABLET DAILY 90 tablet 3    HYDROcodone-acetaminophen (NORCO)  MG per tablet Take 1 tablet by mouth Every 6 (Six) Hours As Needed for Moderate Pain. 120 tablet 0    omeprazole (priLOSEC) 40 MG capsule TAKE 1 CAPSULE DAILY 90 capsule 3    rosuvastatin (CRESTOR) 20 MG tablet TAKE 1 TABLET DAILY 90 tablet 3    spironolactone (ALDACTONE) 50 MG tablet TAKE 1 TABLET DAILY 90 tablet 3    Magnesium 250 MG tablet Take 2 tablets by mouth Daily. (Patient not taking: Reported on 3/29/2024)      traZODone (DESYREL) 50 MG tablet Take 1 tab as needed for sleep on night of sleep study. (Patient not taking: Reported on 3/29/2024) 1 tablet 0     No facility-administered medications prior to visit.       Opioid medication/s are on active medication list.  and I have evaluated his active treatment plan and pain score trends (see table).  Vitals:    03/29/24 1019   PainSc:   8     I have reviewed the chart for potential of high risk medication and harmful drug interactions in the elderly.          Aspirin is on active medication list. Aspirin use is indicated based on review of current medical condition/s. Pros and cons of this therapy have been discussed today. Benefits of this medication outweigh potential harm.  Patient has been  "encouraged to continue taking this medication.  .      Patient Active Problem List   Diagnosis    Hypertension    Mechanical complication of hip prosthesis    Sleep apnea    Stage 3 chronic kidney disease    Degenerative lumbar spinal stenosis    Chronic fatigue    Vitamin D deficiency    Other emphysema    Mixed hyperlipidemia    Gynecomastia, male    Hip arthrosis    Postnasal drip    Intention tremor    Abdominal hernia    Acquired hammer toe of left foot    History of total hip arthroplasty    Chronic gouty arthritis    Gastroesophageal reflux disease    Closed fracture of lower end of left femur with routine healing    FRAN (generalized anxiety disorder)    Mild episode of recurrent major depressive disorder    Prostate cancer    Cellulitis of left lower extremity    Skin tag    Chronic venous stasis    Dry mouth    Idiopathic peripheral neuropathy    Onychomycosis    Juvenile arthritis    History of CEA (carotid endarterectomy)    Anxiety with depression    Stenosis of carotid artery    Mild anemia     Advance Care Planning  Advance Directive is not on file.  ACP discussion was held with the patient during this visit. Patient does not have an advance directive, information provided.    Review of Systems   Constitutional:  Negative for fever.   Respiratory:  Negative for cough and shortness of breath.    Cardiovascular:  Negative for chest pain.        Objective    Vitals:    03/29/24 1019   BP: 136/60   BP Location: Left arm   Patient Position: Sitting   Cuff Size: Adult   Pulse: 88   Temp: 98 °F (36.7 °C)   SpO2: 96%   Weight: 85 kg (187 lb 6.4 oz)   Height: 167.6 cm (66\")   PainSc:   8     BMI Readings from Last 1 Encounters:   03/29/24 30.25 kg/m²   BMI is above normal parameters. Recommendations include: exercise counseling and nutrition counseling    Does the patient have evidence of cognitive impairment? No    Physical Exam  Vitals and nursing note reviewed.   Constitutional:       Appearance: Normal " appearance. He is well-developed.   HENT:      Head: Normocephalic and atraumatic.      Nose: Nose normal.   Eyes:      Conjunctiva/sclera: Conjunctivae normal.      Pupils: Pupils are equal, round, and reactive to light.   Neck:      Thyroid: No thyromegaly.   Cardiovascular:      Rate and Rhythm: Normal rate and regular rhythm.      Heart sounds: Normal heart sounds. No murmur heard.  Pulmonary:      Effort: Pulmonary effort is normal. No respiratory distress.      Breath sounds: Normal breath sounds. No wheezing or rales.   Abdominal:      General: Abdomen is flat. Bowel sounds are normal. There is no distension.      Palpations: Abdomen is soft. There is no hepatomegaly, splenomegaly or mass.      Tenderness: There is no abdominal tenderness. There is no guarding or rebound.      Hernia: No hernia is present.   Musculoskeletal:         General: Normal range of motion.      Cervical back: Normal range of motion and neck supple.      Right lower leg: No edema.      Left lower leg: No edema.   Lymphadenopathy:      Cervical: No cervical adenopathy.   Skin:     General: Skin is warm.   Neurological:      General: No focal deficit present.      Mental Status: He is alert.   Psychiatric:         Mood and Affect: Mood normal.         Behavior: Behavior normal.         Thought Content: Thought content normal.         Judgment: Judgment normal.       Common labs          5/2/2023    12:06 3/12/2024    08:40 3/29/2024    11:51   Common Labs   WBC  5.97     6.89    Hemoglobin  11.8     12.2    Hematocrit  36.7     37.2    Platelets  143     200    Total Cholesterol   154    Triglycerides   121    HDL Cholesterol   54    LDL Cholesterol    79    PSA <0.014      Uric Acid  5.3           Details          This result is from an external source.             Lipid Panel          3/29/2024    11:51   Lipid Panel   Total Cholesterol 154    Triglycerides 121    HDL Cholesterol 54    VLDL Cholesterol 21    LDL Cholesterol  79     LDL/HDL Ratio 1.40      TSH          3/29/2024    11:51   TSH   TSH 1.690      PSA          5/2/2023    12:06   PSA   PSA <0.014          Lab Results   Component Value Date    XLGX91HM 58.8 03/29/2024    FOLATE 18.70 03/29/2024 March 2024 --outside labs done at rheumatologist, all WNL/inflammatory markers, GFR stable at 40                HEALTH RISK ASSESSMENT    Smoking Status:  Social History     Tobacco Use   Smoking Status Never   Smokeless Tobacco Never     Alcohol Consumption:  Social History     Substance and Sexual Activity   Alcohol Use Yes    Comment: BEER OCCASIONALLY     Fall Risk Screen:    STEADI Fall Risk Assessment was completed, and patient is at MODERATE risk for falls. Assessment completed on:3/29/2024    Depression Screening:      3/29/2024    10:28 AM   PHQ-2/PHQ-9 Depression Screening   Little Interest or Pleasure in Doing Things 0-->not at all   Feeling Down, Depressed or Hopeless 0-->not at all   PHQ-9: Brief Depression Severity Measure Score 0       Health Habits and Functional and Cognitive Screening:      3/29/2024    10:27 AM   Functional & Cognitive Status   Do you have difficulty preparing food and eating? No   Do you have difficulty bathing yourself, getting dressed or grooming yourself? No   Do you have difficulty using the toilet? No   Do you have difficulty moving around from place to place? Yes   Do you have trouble with steps or getting out of a bed or a chair? Yes   Current Diet Well Balanced Diet   Dental Exam Not up to date   Eye Exam Not up to date   Exercise (times per week) 0 times per week   Current Exercises Include No Regular Exercise   Do you need help using the phone?  No   Are you deaf or do you have serious difficulty hearing?  No   Do you need help to go to places out of walking distance? Yes   Do you need help shopping? No   Do you need help preparing meals?  No   Do you need help with housework?  No   Do you need help with laundry? No   Do you need help  taking your medications? No   Do you need help managing money? No   Do you ever drive or ride in a car without wearing a seat belt? No       Age-appropriate Screening Schedule:  Refer to the list below for future screening recommendations based on patient's age, sex and/or medical conditions. Orders for these recommended tests are listed in the plan section. The patient has been provided with a written plan.    Health Maintenance   Topic Date Due    ZOSTER VACCINE (1 of 2) Never done    RSV Vaccine - Adults (1 - 1-dose 60+ series) Never done    COVID-19 Vaccine (4 - 2023-24 season) 09/01/2023    INFLUENZA VACCINE  08/01/2024    ANNUAL WELLNESS VISIT  03/29/2025    LIPID PANEL  03/29/2025    BMI FOLLOWUP  03/29/2025    TDAP/TD VACCINES (3 - Td or Tdap) 06/09/2032    Pneumococcal Vaccine 65+  Completed                   Assessment & Plan   CMS Preventative Services Quick Reference  Risk Factors Identified During Encounter  Chronic Pain:  Chronic pain management plan in place  Immunizations Discussed/Encouraged: Influenza, Shingrix, COVID19, and RSV (Respiratory Syncytial Virus)  Inactivity/Sedentary: Patient was advised to exercise at least 150 minutes a week per CDC recommendations.  The above risks/problems have been discussed with the patient.  Follow up actions/plans if indicated are seen below in the Assessment/Plan Section.  Pertinent information has been shared with the patient in the After Visit Summary.    Diagnoses and all orders for this visit:    1. Encounter for Medicare annual wellness exam (Primary) --S/P subsequent  exam done, WNL --   All screening up-to-date except for CBC, ophthalmology exam, and multiple vaccinations but declines despite recommendations.  Discussed making appointment with his ophthalmologist.  Have provided information regarding Living Will.  Needs low-carb/low calorie/low cholesterol diet and increase cardio exercise to greater than 150 minutes weekly   -     CBC &  Differential    2. Prostate cancer --still desires screening, PSA due after May 2024.    3. History of CEA (carotid endarterectomy) declines further screening --continue aggressive risk factor control    4. Mixed hyperlipidemia --uncontrolled  Hopefully improved since increasing Crestor to 20 mg at last visit 3 months ago.  Goal LDL needs to be less than 70 given history of CEA.  If at goal plan to continue Crestor 20 mg daily, otherwise need to increase to 40 mg?  Needs low-carb/low calorie/low cholesterol diet and increase cardio exercise to greater than 150 minutes weekly   -     Lipid Panel With LDL / HDL Ratio    5. Primary hypertension --controlled, continue HCTZ as directed    6. Chronic fatigue --improved  Doing better since starting OTC sublingual B12 and new equipment for TONYA  Check TSH, follow-up on CBC  -     TSH  -     Vitamin B12 & Folate    7. Obstructive sleep apnea syndrome --improved since no equipment for TONYA    8. Anxiety with depression --controlled, continue Cymbalta    9. Degenerative lumbar spinal stenosis --- controlled  Urine Tox screen and controlled substance contract up-to-date/May 2023  Kunal report reviewed, appropriate.  Low risk patient behavior.  Continue Norco as directed below, next refill due 4/6/2024  Intolerant to NSAIDs due to CKD  -     HYDROcodone-acetaminophen (NORCO)  MG per tablet; Take 1 tablet by mouth Every 6 (Six) Hours As Needed for Moderate Pain.  Dispense: 120 tablet; Refill: 0    10. Stage 3b chronic kidney disease --stable  -     CBC & Differential  -     Vitamin D,25-Hydroxy    11. Vitamin D deficiency  -     Vitamin D,25-Hydroxy    12. Chronic gouty arthritis -stable, per rheumatologist-     13. Other emphysema --stable, continue Dulera as prescribed daily, continue Combivent as prescribed as needed    Other orders  -     allopurinol (ZYLOPRIM) 100 MG tablet; Take 1 tablet by mouth Daily.  Dispense: 90 tablet; Refill: 3  -     Dulera 100-5 MCG/ACT  inhaler; Inhale 2 puffs 2 (Two) Times a Day.  Dispense: 39 g; Refill: 3  -     DULoxetine (CYMBALTA) 30 MG capsule; Take 1 capsule by mouth Daily.  Dispense: 90 capsule; Refill: 3  -     hydroCHLOROthiazide 12.5 MG tablet; Take 1 tablet by mouth Daily.  Dispense: 90 tablet; Refill: 3  -     omeprazole (priLOSEC) 40 MG capsule; Take 1 capsule by mouth Daily.  Dispense: 90 capsule; Refill: 3  -     rosuvastatin (CRESTOR) 20 MG tablet; Take 1 tablet by mouth Daily.  Dispense: 90 tablet; Refill: 3  -     spironolactone (ALDACTONE) 50 MG tablet; Take 1 tablet by mouth Daily.  Dispense: 90 tablet; Refill: 3    In addition to wellness exam today, seen and treated for separate and identifiable --- uncontrolled hyperlipidemia in the setting of CEA/carotid stenosis, follow-up/continue management of chronic fatigue        Follow Up:   Return in about 3 months (around 6/29/2024) for Recheck.     An After Visit Summary and PPPS were made available to the patient.

## 2024-03-29 NOTE — PATIENT INSTRUCTIONS
Please provide information for Living Will    May receive shingles vaccine, RSV, COVID booster from pharmacy in near future to    Referral placed to ophthalmologist for screening    Recommend low fat/low calorie diet and exercise greater than 150 minutes of cardio per week.      Further recommendations based on labs

## 2024-04-29 RX ORDER — SERTRALINE HYDROCHLORIDE 100 MG/1
100 TABLET, FILM COATED ORAL DAILY
COMMUNITY
End: 2024-04-29 | Stop reason: SDUPTHER

## 2024-04-29 RX ORDER — SERTRALINE HYDROCHLORIDE 100 MG/1
100 TABLET, FILM COATED ORAL DAILY
Qty: 90 TABLET | Refills: 1 | Status: SHIPPED | OUTPATIENT
Start: 2024-04-29

## 2024-05-05 DIAGNOSIS — M48.061 DEGENERATIVE LUMBAR SPINAL STENOSIS: ICD-10-CM

## 2024-05-08 RX ORDER — HYDROCODONE BITARTRATE AND ACETAMINOPHEN 10; 325 MG/1; MG/1
1 TABLET ORAL EVERY 6 HOURS PRN
Qty: 120 TABLET | Refills: 0 | Status: SHIPPED | OUTPATIENT
Start: 2024-05-08

## 2024-06-08 DIAGNOSIS — M48.061 DEGENERATIVE LUMBAR SPINAL STENOSIS: ICD-10-CM

## 2024-06-10 NOTE — TELEPHONE ENCOUNTER
Rx Refill Note  Requested Prescriptions     Pending Prescriptions Disp Refills    HYDROcodone-acetaminophen (NORCO)  MG per tablet 120 tablet 0     Sig: Take 1 tablet by mouth Every 6 (Six) Hours As Needed for Moderate Pain.      Last office visit with prescribing clinician: 3/29/2024   Last telemedicine visit with prescribing clinician: Visit date not found   Next office visit with prescribing clinician: 7/12/2024                         Would you like a call back once the refill request has been completed: [] Yes [] No    If the office needs to give you a call back, can they leave a voicemail: [] Yes [] No    Donte Ramires CMA/LMR  06/10/24, 08:04 EDT

## 2024-06-12 RX ORDER — HYDROCODONE BITARTRATE AND ACETAMINOPHEN 10; 325 MG/1; MG/1
1 TABLET ORAL EVERY 6 HOURS PRN
Qty: 120 TABLET | Refills: 0 | Status: SHIPPED | OUTPATIENT
Start: 2024-06-12

## 2024-06-18 ENCOUNTER — TELEPHONE (OUTPATIENT)
Dept: FAMILY MEDICINE CLINIC | Facility: CLINIC | Age: 84
End: 2024-06-18
Payer: MEDICARE

## 2024-06-18 RX ORDER — CEPHALEXIN 500 MG/1
500 CAPSULE ORAL 2 TIMES DAILY
Qty: 14 CAPSULE | Refills: 0 | Status: SHIPPED | OUTPATIENT
Start: 2024-06-18

## 2024-06-21 ENCOUNTER — TELEPHONE (OUTPATIENT)
Dept: FAMILY MEDICINE CLINIC | Facility: CLINIC | Age: 84
End: 2024-06-21
Payer: MEDICARE

## 2024-07-12 ENCOUNTER — OFFICE VISIT (OUTPATIENT)
Dept: FAMILY MEDICINE CLINIC | Facility: CLINIC | Age: 84
End: 2024-07-12
Payer: MEDICARE

## 2024-07-12 VITALS
BODY MASS INDEX: 29.15 KG/M2 | WEIGHT: 181.4 LBS | DIASTOLIC BLOOD PRESSURE: 68 MMHG | HEIGHT: 66 IN | HEART RATE: 76 BPM | SYSTOLIC BLOOD PRESSURE: 114 MMHG | TEMPERATURE: 97.8 F | OXYGEN SATURATION: 99 %

## 2024-07-12 DIAGNOSIS — I10 PRIMARY HYPERTENSION: ICD-10-CM

## 2024-07-12 DIAGNOSIS — N18.32 STAGE 3B CHRONIC KIDNEY DISEASE: ICD-10-CM

## 2024-07-12 DIAGNOSIS — C61 PROSTATE CANCER: ICD-10-CM

## 2024-07-12 DIAGNOSIS — L57.0 ACTINIC KERATOSIS: ICD-10-CM

## 2024-07-12 DIAGNOSIS — F41.8 ANXIETY WITH DEPRESSION: ICD-10-CM

## 2024-07-12 DIAGNOSIS — Z79.899 HIGH RISK MEDICATION USE: ICD-10-CM

## 2024-07-12 DIAGNOSIS — I87.8 CHRONIC VENOUS STASIS: ICD-10-CM

## 2024-07-12 DIAGNOSIS — E78.2 MIXED HYPERLIPIDEMIA: ICD-10-CM

## 2024-07-12 DIAGNOSIS — M06.00 SERONEGATIVE RHEUMATOID ARTHRITIS: ICD-10-CM

## 2024-07-12 DIAGNOSIS — L82.1 SEBORRHEIC KERATOSIS: ICD-10-CM

## 2024-07-12 DIAGNOSIS — I65.29 STENOSIS OF CAROTID ARTERY, UNSPECIFIED LATERALITY: ICD-10-CM

## 2024-07-12 DIAGNOSIS — J43.8 OTHER EMPHYSEMA: ICD-10-CM

## 2024-07-12 DIAGNOSIS — M48.061 DEGENERATIVE LUMBAR SPINAL STENOSIS: Primary | ICD-10-CM

## 2024-07-12 PROBLEM — G25.2 INTENTION TREMOR: Status: RESOLVED | Noted: 2021-12-21 | Resolved: 2024-07-12

## 2024-07-12 PROBLEM — Z78.9 MEDICALLY COMPLEX PATIENT: Status: ACTIVE | Noted: 2024-07-12

## 2024-07-12 RX ORDER — HYDROCODONE BITARTRATE AND ACETAMINOPHEN 10; 325 MG/1; MG/1
1 TABLET ORAL EVERY 6 HOURS PRN
Qty: 120 TABLET | Refills: 0 | Status: SHIPPED | OUTPATIENT
Start: 2024-07-12

## 2024-07-12 RX ORDER — LEFLUNOMIDE 10 MG/1
10 TABLET ORAL DAILY
COMMUNITY
Start: 2024-06-27

## 2024-07-12 NOTE — PATIENT INSTRUCTIONS
Referral placed to Dr. Sarkar    Needs to get carotid ultrasound to recheck on carotid stenosis history, referral placed    Further recommendations based on labs    Follow-up with specialist as planned

## 2024-07-12 NOTE — PROGRESS NOTES
Chief Complaint  Degenerative lumbar spinal stenosis (3 months check up ), Hyperlipidemia, Rheumatoid Arthritis, Anxiety, Depression, Osteoarthritis, and Chronic Kidney Disease    3-month follow-up on lumbar spinal stenosis, arthritis pain, new diagnosis RA  And  6-month follow-up on HTN, hyperlipidemia, CKD, chronic venous stasis, FRAN/depression, carotid artery stenosis history, and requesting new referral updated to dermatologist    Last visit with me in March 2024 for Medicare wellness, chronic med refills with labs  --Screening studies all updated, routine labs done and stable    Other interval history since last seen by me ----seen in follow-up by both his rheumatologist (referred back to rheumatologist after no follow-up x several years,) and his nephrologist.    Seen rheumatologist/Dr. Carmela Wood about 3 MONTHS AGO ON 4/25/2024 --- after review of repeat labs (only positive for elevated ESR) and review of MRI of shoulder and multiple x-rays, suspect seronegative RA.  Treated with a prednisone taper and started on new medication/Arava.  Requested that he gets repeat CBC and CMP done from his PCP at next follow-up.    Also recently seen by his kidney specialist/Dr. Romo ABOUT 1 MONTH AGO:  NEPHROLOGY - SCAN - FOLLOW UP, DR MATILDE ROMO, [id number] (06/07/2024) --renal function stable, GFR 34.  Continue to avoid NSAIDs, adjust medications for CKD, follow-up in 6 months.      Overall, doing better.  Sleep and fatigue have improved since getting new equipment for CPAP and starting B12.  Mood has improved.  No recent falls or ER visits.  Weight loss of about 6 to 10 pounds noted in the last 6 months, but significant improvement with lower extremity edema.  Still tries to maintain a healthy diet but no exercise due to immobility.  No chest pain, SOA, or increased edema.  No abdominal pain, nausea or vomiting.      Today, requesting new updated referral to his dermatologist/Dr. Sarkar.  Would like to get multiple skin  "lesions removed from his scalp.    Needs refills and repeat lab work  Compliant with and tolerates all medications without side effects.  Still relies on Norco 10 mg a quantity of 4/day for significant lumbar spinal stenosis and significant hip arthrosis.        Review of Systems   Constitutional:  Negative for fever and unexpected weight change.   Respiratory:  Negative for cough and shortness of breath.    Cardiovascular:  Negative for chest pain.        Subjective          Kike Rubio presents to [facility]    Objective   Vital Signs:   Vitals:    07/12/24 0941   BP: 114/68   BP Location: Right arm   Patient Position: Sitting   Cuff Size: Adult   Pulse: 76   Temp: 97.8 °F (36.6 °C)   SpO2: 99%   Weight: 82.3 kg (181 lb 6.4 oz)   Height: 167.6 cm (66\")      Body mass index is 29.28 kg/m².   Physical Exam  Vitals and nursing note reviewed.   Constitutional:       Appearance: Normal appearance. He is well-developed.   HENT:      Head: Normocephalic and atraumatic.      Nose: Nose normal.   Eyes:      Conjunctiva/sclera: Conjunctivae normal.      Pupils: Pupils are equal, round, and reactive to light.   Neck:      Thyroid: No thyromegaly.   Cardiovascular:      Rate and Rhythm: Normal rate and regular rhythm.      Heart sounds: Normal heart sounds. No murmur heard.     Comments: Bilateral lower extremities --brawny appearance, much improved chronic venous stasis changes, minimal edema bilaterally  Pulmonary:      Effort: Pulmonary effort is normal. No respiratory distress.      Breath sounds: Normal breath sounds. No wheezing or rales.   Abdominal:      General: Abdomen is flat. Bowel sounds are normal. There is no distension.      Palpations: Abdomen is soft. There is no hepatomegaly, splenomegaly or mass.      Tenderness: There is no abdominal tenderness. There is no guarding or rebound.      Hernia: No hernia is present.   Musculoskeletal:         General: Normal range of motion.     "  Cervical back: Normal range of motion and neck supple.      Right lower leg: No edema.      Left lower leg: No edema.   Lymphadenopathy:      Cervical: No cervical adenopathy.   Skin:     General: Skin is warm.      Comments: Scalp --- numerous plaque-like lesions   Neurological:      General: No focal deficit present.      Mental Status: He is alert.   Psychiatric:         Mood and Affect: Mood normal.         Behavior: Behavior normal.         Thought Content: Thought content normal.         Judgment: Judgment normal.        Result Review :     Common labs          3/12/2024    08:40 3/29/2024    11:51   Common Labs   WBC 5.97     6.89    Hemoglobin 11.8     12.2    Hematocrit 36.7     37.2    Platelets 143     200    Total Cholesterol  154    Triglycerides  121    HDL Cholesterol  54    LDL Cholesterol   79    Uric Acid 5.3           Details          This result is from an external source.             Lipid Panel          3/29/2024    11:51   Lipid Panel   Total Cholesterol 154    Triglycerides 121    HDL Cholesterol 54    VLDL Cholesterol 21    LDL Cholesterol  79    LDL/HDL Ratio 1.40      TSH          3/29/2024    11:51   TSH   TSH 1.690            Lab Results   Component Value Date    PNZB27JI 58.8 03/29/2024    FOLATE 18.70 03/29/2024      COMPREHENSIVE METABOLIC PANEL (03/12/2024 08:40) --creatinine 1.68, GFR 40    May 2024 --- GFR 30         Assessment and Plan    Diagnoses and all orders for this visit:    1. Degenerative lumbar spinal stenosis (Primary) --controlled  Due to update Urine Tox screen and controlled substance contract today.  Kunal report reviewed, appropriate.  Very low risk patient behavior.  Plan to continue Norco as prescribed below, refill due today.  Intolerant to NSAIDs due to CKD  -     HYDROcodone-acetaminophen (NORCO)  MG per tablet; Take 1 tablet by mouth Every 6 (Six) Hours As Needed for Moderate Pain.  Dispense: 120 tablet; Refill: 0  -     ToxASSURE Select 13 Discrete  -    2. Seronegative rheumatoid arthritis --new Dx  S/P recent visits with rheumatologist/Dr. Carmela Wood reviewed.  S/P recent prednisone taper and started on new high risk medicine/Arava about 2 months ago.  Seems to be tolerating and some clinical improvement.  Check CBC and CMP today as requested by specialist.  He does need to follow back up with Dr. Wood as planned.  Maintains regular ophthalmology visits.    3. Primary hypertension --controlled  No change with HCTZ  -     Comprehensive Metabolic Panel  -     CBC & Differential    4. Mixed hyperlipidemia --fair control  Ideally goal LDL needs to be less than 70 given remote history of carotid stenosis  Near goal at last visit with increased dose of Crestor 20 mg daily.  Continue to improve upon a low-cholesterol diet.    5. Other emphysema --stable, no change with MDIs    6. Anxiety with depression --stable  Continue Cymbalta as prescribed    7. Stage 3b chronic kidney disease --stable  Continue to avoid NSAIDs  Per renal/Dr. Romo  -     Comprehensive Metabolic Panel  -     CBC & Differential    8. Chronic venous stasis --much improved  Continue with HCTZ and conservative treatment.    9. Prostate cancer --stable, still requesting follow-up PSA  -     PSA DIAGNOSTIC ONLY    10. Stenosis of carotid artery, unspecified laterality --status post CEA in distant past  Due to recheck carotid ultrasound, will get this scheduled  Continue with aggressive risk factor control--ASA, lipids, HTN    11. Actinic keratosis --uncontrolled, multiple lesions on scalp  Needs updated referral to his dermatologist/Dr. Sarkar  -     Ambulatory Referral to Dermatology    12. Seborrheic keratosis --multiple lesions on scalp  -     Ambulatory Referral to Dermatology    13. High risk medication use  -     ToxASSURE Select 13 Discrete -      I spent 40 minutes caring for Kike on this date of service. This time includes time spent by me in the following activities:preparing for the  visit, reviewing tests, obtaining and/or reviewing a separately obtained history, performing a medically appropriate examination and/or evaluation , counseling and educating the patient/family/caregiver, ordering medications, tests, or procedures, referring and communicating with other health care professionals , documenting information in the medical record, independently interpreting results and communicating that information with the patient/family/caregiver, care coordination, and counseling, education, and management of multiple chronic diagnoses  Follow Up   Return in about 3 months (around 10/12/2024) for Recheck.  Patient was given instructions and counseling regarding his condition or for health maintenance advice. Please see specific information pulled into the AVS if appropriate.

## 2024-07-13 PROBLEM — L57.0 ACTINIC KERATOSIS: Status: ACTIVE | Noted: 2024-07-13

## 2024-07-13 PROBLEM — L82.1 SEBORRHEIC KERATOSIS: Status: ACTIVE | Noted: 2024-07-13

## 2024-07-13 PROBLEM — R68.2 DRY MOUTH: Status: RESOLVED | Noted: 2023-09-20 | Resolved: 2024-07-13

## 2024-07-13 PROBLEM — L91.8 SKIN TAG: Status: RESOLVED | Noted: 2023-05-02 | Resolved: 2024-07-13

## 2024-07-15 NOTE — TELEPHONE ENCOUNTER
Wants referral for his carpal tunnel they are coming out for wife and said they could help him not doing well after surgery for his Carpal tunnel   normal gait and station , no tenderness or deformities present

## 2024-07-20 LAB
6MAM UR QL CFM: NEGATIVE
6MAM/CREAT UR: NOT DETECTED NG/MG CREAT
7AMINOCLONAZEPAM/CREAT UR: NOT DETECTED NG/MG CREAT
A-OH ALPRAZ/CREAT UR: NOT DETECTED NG/MG CREAT
A-OH-TRIAZOLAM/CREAT UR CFM: NOT DETECTED NG/MG CREAT
ALBUMIN SERPL-MCNC: 4.1 G/DL (ref 3.7–4.7)
ALFENTANIL/CREAT UR CFM: NOT DETECTED NG/MG CREAT
ALP SERPL-CCNC: 112 IU/L (ref 44–121)
ALPHA-HYDROXYMIDAZOLAM, URINE: NOT DETECTED NG/MG CREAT
ALPRAZ/CREAT UR CFM: NOT DETECTED NG/MG CREAT
ALT SERPL-CCNC: 11 IU/L (ref 0–44)
AMOBARBITAL UR QL CFM: NOT DETECTED
AMPHET/CREAT UR: NOT DETECTED NG/MG CREAT
AMPHETAMINES UR QL CFM: NEGATIVE
AST SERPL-CCNC: 22 IU/L (ref 0–40)
BARBITAL UR QL CFM: NOT DETECTED
BARBITURATES UR QL CFM: NEGATIVE
BASOPHILS # BLD AUTO: 0.1 X10E3/UL (ref 0–0.2)
BASOPHILS NFR BLD AUTO: 1 %
BENZODIAZ UR QL CFM: NEGATIVE
BILIRUB SERPL-MCNC: 0.3 MG/DL (ref 0–1.2)
BUN SERPL-MCNC: 26 MG/DL (ref 8–27)
BUN/CREAT SERPL: 13 (ref 10–24)
BUPRENORPHINE UR QL CFM: NEGATIVE
BUPRENORPHINE/CREAT UR: NOT DETECTED NG/MG CREAT
BUTABARBITAL UR QL CFM: NOT DETECTED
BUTALBITAL UR QL CFM: NOT DETECTED
BZE/CREAT UR: NOT DETECTED NG/MG CREAT
CALCIUM SERPL-MCNC: 9.6 MG/DL (ref 8.6–10.2)
CANNABINOIDS UR QL CFM: NEGATIVE
CARBOXYTHC/CREAT UR: NOT DETECTED NG/MG CREAT
CHLORIDE SERPL-SCNC: 102 MMOL/L (ref 96–106)
CLONAZEPAM/CREAT UR CFM: NOT DETECTED NG/MG CREAT
CO2 SERPL-SCNC: 24 MMOL/L (ref 20–29)
COCAETHYLENE/CREAT UR CFM: NOT DETECTED NG/MG CREAT
COCAINE UR QL CFM: NEGATIVE
COCAINE/CREAT UR CFM: NOT DETECTED NG/MG CREAT
CODEINE/CREAT UR: NOT DETECTED NG/MG CREAT
CREAT SERPL-MCNC: 2.01 MG/DL (ref 0.76–1.27)
CREAT UR-MCNC: 96 MG/DL
DESALKYLFLURAZ/CREAT UR: NOT DETECTED NG/MG CREAT
DESMETHYLFLUNITRAZEPAM: NOT DETECTED NG/MG CREAT
DHC/CREAT UR: 540 NG/MG CREAT
DIAZEPAM/CREAT UR: NOT DETECTED NG/MG CREAT
DRUGS UR: NORMAL
EDDP/CREAT UR: NOT DETECTED NG/MG CREAT
EGFRCR SERPLBLD CKD-EPI 2021: 32 ML/MIN/1.73
EOSINOPHIL # BLD AUTO: 0.4 X10E3/UL (ref 0–0.4)
EOSINOPHIL NFR BLD AUTO: 6 %
ERYTHROCYTE [DISTWIDTH] IN BLOOD BY AUTOMATED COUNT: 12.7 % (ref 11.6–15.4)
ETHANOL UR CFM-MCNC: NOT DETECTED G/DL
ETHANOL UR QL CFM: NEGATIVE
FENTANYL UR QL CFM: NEGATIVE
FENTANYL/CREAT UR: NOT DETECTED NG/MG CREAT
FLUNITRAZEPAM UR QL CFM: NOT DETECTED NG/MG CREAT
GLOBULIN SER CALC-MCNC: 2.8 G/DL (ref 1.5–4.5)
GLUCOSE SERPL-MCNC: 93 MG/DL (ref 70–99)
HCT VFR BLD AUTO: 37.5 % (ref 37.5–51)
HGB BLD-MCNC: 12.2 G/DL (ref 13–17.7)
HYDROCODONE/CREAT UR: 1514 NG/MG CREAT
HYDROMORPHONE/CREAT UR: 726 NG/MG CREAT
IMM GRANULOCYTES # BLD AUTO: 0 X10E3/UL (ref 0–0.1)
IMM GRANULOCYTES NFR BLD AUTO: 0 %
LORAZEPAM/CREAT UR: NOT DETECTED NG/MG CREAT
LYMPHOCYTES # BLD AUTO: 1.4 X10E3/UL (ref 0.7–3.1)
LYMPHOCYTES NFR BLD AUTO: 20 %
MCH RBC QN AUTO: 32 PG (ref 26.6–33)
MCHC RBC AUTO-ENTMCNC: 32.5 G/DL (ref 31.5–35.7)
MCV RBC AUTO: 98 FL (ref 79–97)
MDA/CREAT UR: NOT DETECTED NG/MG CREAT
MDMA/CREAT UR: NOT DETECTED NG/MG CREAT
MEPHOBARBITAL UR QL CFM: NOT DETECTED
METHADONE UR QL CFM: NEGATIVE
METHADONE/CREAT UR: NOT DETECTED NG/MG CREAT
METHAMPHET/CREAT UR: NOT DETECTED NG/MG CREAT
MIDAZOLAM/CREAT UR CFM: NOT DETECTED NG/MG CREAT
MONOCYTES # BLD AUTO: 1 X10E3/UL (ref 0.1–0.9)
MONOCYTES NFR BLD AUTO: 14 %
MORPHINE/CREAT UR: NOT DETECTED NG/MG CREAT
N-NORTRAMADOL/CREAT UR CFM: NOT DETECTED NG/MG CREAT
NARCOTICS UR: NEGATIVE
NEUTROPHILS # BLD AUTO: 4.2 X10E3/UL (ref 1.4–7)
NEUTROPHILS NFR BLD AUTO: 59 %
NORBUPRENORPHINE/CREAT UR: NOT DETECTED NG/MG CREAT
NORCODEINE/CREAT UR CFM: NOT DETECTED NG/MG CREAT
NORDIAZEPAM/CREAT UR: NOT DETECTED NG/MG CREAT
NORFENTANYL/CREAT UR: NOT DETECTED NG/MG CREAT
NORHYDROCODONE/CREAT UR: 2140 NG/MG CREAT
NORMORPHINE UR-MCNC: NOT DETECTED NG/MG CREAT
NOROXYCODONE/CREAT UR: NOT DETECTED NG/MG CREAT
NOROXYMORPHONE/CREAT UR CFM: NOT DETECTED NG/MG CREAT
O-NORTRAMADOL UR CFM-MCNC: NOT DETECTED NG/MG CREAT
OPIATES UR QL CFM: NORMAL
OXAZEPAM/CREAT UR: NOT DETECTED NG/MG CREAT
OXYCODONE UR QL CFM: NEGATIVE
OXYCODONE/CREAT UR: NOT DETECTED NG/MG CREAT
OXYMORPHONE/CREAT UR: NOT DETECTED NG/MG CREAT
PENTOBARB UR QL CFM: NOT DETECTED
PHENOBARB UR QL CFM: NOT DETECTED
PLATELET # BLD AUTO: 177 X10E3/UL (ref 150–450)
POTASSIUM SERPL-SCNC: 4.1 MMOL/L (ref 3.5–5.2)
PROT SERPL-MCNC: 6.9 G/DL (ref 6–8.5)
PSA SERPL-MCNC: <0.1 NG/ML (ref 0–4)
RBC # BLD AUTO: 3.81 X10E6/UL (ref 4.14–5.8)
SECOBARBITAL UR QL CFM: NOT DETECTED
SERVICE CMNT 02-IMP: NORMAL
SODIUM SERPL-SCNC: 141 MMOL/L (ref 134–144)
SUFENTANIL/CREAT UR CFM: NOT DETECTED NG/MG CREAT
TAPENTADOL UR QL CFM: NEGATIVE
TAPENTADOL/CREAT UR: NOT DETECTED NG/MG CREAT
TEMAZEPAM/CREAT UR: NOT DETECTED NG/MG CREAT
THIOPENTAL UR QL CFM: NOT DETECTED
TRAMADOL UR QL CFM: NOT DETECTED NG/MG CREAT
WBC # BLD AUTO: 7.1 X10E3/UL (ref 3.4–10.8)

## 2024-08-09 DIAGNOSIS — M48.061 DEGENERATIVE LUMBAR SPINAL STENOSIS: ICD-10-CM

## 2024-08-12 NOTE — TELEPHONE ENCOUNTER
Rx Refill Note  Requested Prescriptions     Pending Prescriptions Disp Refills    HYDROcodone-acetaminophen (NORCO)  MG per tablet 120 tablet 0     Sig: Take 1 tablet by mouth Every 6 (Six) Hours As Needed for Moderate Pain.      Last office visit with prescribing clinician: 7/12/2024   Last telemedicine visit with prescribing clinician: Visit date not found   Next office visit with prescribing clinician: Visit date not found                         Would you like a call back once the refill request has been completed: [] Yes [] No    If the office needs to give you a call back, can they leave a voicemail: [] Yes [] No    Donte Ramires CMA/LMR  08/12/24, 07:57 EDT

## 2024-08-14 RX ORDER — HYDROCODONE BITARTRATE AND ACETAMINOPHEN 10; 325 MG/1; MG/1
1 TABLET ORAL EVERY 6 HOURS PRN
Qty: 120 TABLET | Refills: 0 | Status: SHIPPED | OUTPATIENT
Start: 2024-08-14

## 2024-09-10 RX ORDER — SERTRALINE HYDROCHLORIDE 100 MG/1
100 TABLET, FILM COATED ORAL DAILY
Qty: 90 TABLET | Refills: 1 | Status: SHIPPED | OUTPATIENT
Start: 2024-09-10

## 2024-09-11 DIAGNOSIS — M48.061 DEGENERATIVE LUMBAR SPINAL STENOSIS: ICD-10-CM

## 2024-09-18 ENCOUNTER — TELEPHONE (OUTPATIENT)
Dept: FAMILY MEDICINE CLINIC | Facility: CLINIC | Age: 84
End: 2024-09-18
Payer: MEDICARE

## 2024-09-18 RX ORDER — HYDROCODONE BITARTRATE AND ACETAMINOPHEN 10; 325 MG/1; MG/1
1 TABLET ORAL EVERY 6 HOURS PRN
Qty: 120 TABLET | Refills: 0 | Status: SHIPPED | OUTPATIENT
Start: 2024-09-18

## 2024-10-16 DIAGNOSIS — M48.061 DEGENERATIVE LUMBAR SPINAL STENOSIS: ICD-10-CM

## 2024-10-16 NOTE — TELEPHONE ENCOUNTER
Rx Refill Note  Requested Prescriptions     Pending Prescriptions Disp Refills    HYDROcodone-acetaminophen (NORCO)  MG per tablet 120 tablet 0     Sig: Take 1 tablet by mouth Every 6 (Six) Hours As Needed for Moderate Pain.      Last office visit with prescribing clinician: 7/12/2024   Last telemedicine visit with prescribing clinician: Visit date not found   Next office visit with prescribing clinician: 11/4/2024                         Would you like a call back once the refill request has been completed: [] Yes [] No    If the office needs to give you a call back, can they leave a voicemail: [] Yes [] No    Mango Watkins MA  10/16/24, 14:32 EDT

## 2024-10-18 RX ORDER — HYDROCODONE BITARTRATE AND ACETAMINOPHEN 10; 325 MG/1; MG/1
1 TABLET ORAL EVERY 6 HOURS PRN
Qty: 120 TABLET | Refills: 0 | Status: SHIPPED | OUTPATIENT
Start: 2024-10-18

## 2024-11-04 ENCOUNTER — OFFICE VISIT (OUTPATIENT)
Dept: FAMILY MEDICINE CLINIC | Facility: CLINIC | Age: 84
End: 2024-11-04
Payer: MEDICARE

## 2024-11-04 VITALS
BODY MASS INDEX: 28.51 KG/M2 | HEIGHT: 66 IN | OXYGEN SATURATION: 97 % | HEART RATE: 76 BPM | WEIGHT: 177.4 LBS | DIASTOLIC BLOOD PRESSURE: 74 MMHG | TEMPERATURE: 97.5 F | SYSTOLIC BLOOD PRESSURE: 120 MMHG

## 2024-11-04 DIAGNOSIS — E78.2 MIXED HYPERLIPIDEMIA: ICD-10-CM

## 2024-11-04 DIAGNOSIS — Z78.9 MEDICALLY COMPLEX PATIENT: ICD-10-CM

## 2024-11-04 DIAGNOSIS — F41.8 ANXIETY WITH DEPRESSION: ICD-10-CM

## 2024-11-04 DIAGNOSIS — M48.061 DEGENERATIVE LUMBAR SPINAL STENOSIS: ICD-10-CM

## 2024-11-04 DIAGNOSIS — I65.29 STENOSIS OF CAROTID ARTERY, UNSPECIFIED LATERALITY: ICD-10-CM

## 2024-11-04 DIAGNOSIS — G47.33 OBSTRUCTIVE SLEEP APNEA SYNDROME: ICD-10-CM

## 2024-11-04 DIAGNOSIS — M06.00 SERONEGATIVE RHEUMATOID ARTHRITIS: ICD-10-CM

## 2024-11-04 DIAGNOSIS — I10 PRIMARY HYPERTENSION: Primary | ICD-10-CM

## 2024-11-04 DIAGNOSIS — D64.9 MILD CHRONIC ANEMIA: ICD-10-CM

## 2024-11-04 DIAGNOSIS — R63.4 WEIGHT LOSS: ICD-10-CM

## 2024-11-04 DIAGNOSIS — I87.8 CHRONIC VENOUS STASIS: ICD-10-CM

## 2024-11-04 DIAGNOSIS — N18.32 STAGE 3B CHRONIC KIDNEY DISEASE: ICD-10-CM

## 2024-11-04 DIAGNOSIS — J43.8 OTHER EMPHYSEMA: ICD-10-CM

## 2024-11-04 PROCEDURE — 3078F DIAST BP <80 MM HG: CPT | Performed by: FAMILY MEDICINE

## 2024-11-04 PROCEDURE — 3074F SYST BP LT 130 MM HG: CPT | Performed by: FAMILY MEDICINE

## 2024-11-04 PROCEDURE — G2211 COMPLEX E/M VISIT ADD ON: HCPCS | Performed by: FAMILY MEDICINE

## 2024-11-04 PROCEDURE — 1160F RVW MEDS BY RX/DR IN RCRD: CPT | Performed by: FAMILY MEDICINE

## 2024-11-04 PROCEDURE — 1159F MED LIST DOCD IN RCRD: CPT | Performed by: FAMILY MEDICINE

## 2024-11-04 PROCEDURE — 1125F AMNT PAIN NOTED PAIN PRSNT: CPT | Performed by: FAMILY MEDICINE

## 2024-11-04 PROCEDURE — 99215 OFFICE O/P EST HI 40 MIN: CPT | Performed by: FAMILY MEDICINE

## 2024-11-04 RX ORDER — HYDROCODONE BITARTRATE AND ACETAMINOPHEN 10; 325 MG/1; MG/1
1 TABLET ORAL EVERY 6 HOURS PRN
Qty: 120 TABLET | Refills: 0 | Status: SHIPPED | OUTPATIENT
Start: 2024-11-17

## 2024-11-04 NOTE — PROGRESS NOTES
Chief Complaint  Degenerative lumbaar spinal stenosis (3 month check up), Chronic Kidney Disease, Hyperlipidemia, Anxiety, Depression, and Rheumatoid Arthritis    3-month follow-up for lumbar spinal stenosis, CKD, and weight loss  And  6-month follow-up on HTN, hyperlipidemia, FRAN/depression, RA, and COPD    Last visit with me in July 2024 for chronic med refills with labs  --Specialist records reviewed, stable findings with rheumatologist and nephrologist  -- Requested updated referral to his dermatologist, Dr. Sarkar for removal of SKs and AK's.  -- Chronic med refills with labs done, all WNL except for some mild further decline with GFR (32.)  -- Updated Urine Tox screen and controlled substance contract, appropriate      Other interval history since last seen by me --- most recently seen his rheumatologist/Dr. Carmela Wood at [facility] ABOUT 3 MONTHS AGO, on 8/9/2024, records reviewed --- doing well with his RA, nearing remission?  Inflammatory markers checked.  Continued on Arava, plan is to avoid steroids due to issues with vision.  Also recheck a uric acid level with goal to maintain less than 6, no change with allopurinol.    Doing better overall.  Although, he is quite frustrated and irritated with the [employer] scheduling?  Somehow his appointment last month got canceled??  Really tries to come with his wife (who is also vision impaired) as they come on [facility] [place] [address] transportation.    No recent ER visits or urgent care visits.  Sleep and fatigue have improved since getting new equipment for CPAP and starting B12.  Mood has improved.  Weight loss of about 4 pounds in the last 3 months and about 15 pounds in the last year, but significant improvement with lower extremity edema.  Thinks this is due to just getting older, decreased taste buds.  Really only eating 2 large meals a day (breakfast and dinner.)  Still tries to maintain a healthy diet but no exercise due to immobility.  No chest pain, SOA, or increased  "edema.  No abdominal pain, nausea or vomiting.    No new complaints or concerns today.  Needs refills and 6-month fasting labs.  Compliant with and tolerates all medications without side effects.    Still maintained on Norco 10 mg a quantity of 4/day chronic diagnosis of significant lumbar spinal stenosis, severe hip OA, and general RA.  This allows adequate pain relief, enjoyment in life, and provides the ability to maintain general activity level.  There has been no change in history that would increase the risk of overdose or other adverse events.      Review of Systems   Constitutional:  Negative for fever and unexpected weight change.   Respiratory:  Negative for cough and shortness of breath.    Cardiovascular:  Negative for chest pain.        Subjective          Kike Rubio presents to [facility]    Objective   Vital Signs:   Vitals:    11/04/24 1237   BP: 120/74   BP Location: Right arm   Patient Position: Sitting   Cuff Size: Adult   Pulse: 76   Temp: 97.5 °F (36.4 °C)   SpO2: 97%   Weight: 80.5 kg (177 lb 6.4 oz)   Height: 167.6 cm (66\")      Body mass index is 28.63 kg/m².   Physical Exam  Vitals and nursing note reviewed.   Constitutional:       Appearance: Normal appearance. He is well-developed.      Comments: Using his walker today   HENT:      Head: Normocephalic and atraumatic.      Nose: Nose normal.   Eyes:      Conjunctiva/sclera: Conjunctivae normal.      Pupils: Pupils are equal, round, and reactive to light.   Neck:      Thyroid: No thyromegaly.   Cardiovascular:      Rate and Rhythm: Normal rate and regular rhythm.      Heart sounds: Normal heart sounds. No murmur heard.  Pulmonary:      Effort: Pulmonary effort is normal. No respiratory distress.      Breath sounds: Normal breath sounds. No wheezing or rales.   Abdominal:      General: Abdomen is flat. Bowel sounds are normal. There is no distension.      Palpations: Abdomen is soft. There is no hepatomegaly, " splenomegaly or mass.      Tenderness: There is no abdominal tenderness. There is no guarding or rebound.      Hernia: No hernia is present.   Musculoskeletal:         General: Normal range of motion.      Cervical back: Normal range of motion and neck supple.      Right lower leg: No edema.      Left lower leg: No edema.   Lymphadenopathy:      Cervical: No cervical adenopathy.   Skin:     General: Skin is warm.   Neurological:      General: No focal deficit present.      Mental Status: He is alert.   Psychiatric:         Mood and Affect: Mood normal.         Behavior: Behavior normal.         Thought Content: Thought content normal.         Judgment: Judgment normal.        Result Review :     Common labs          3/29/2024    11:51 7/12/2024    10:49 8/9/2024    08:20   Common Labs   Glucose  93     BUN  26     Creatinine  2.01     Sodium  141     Potassium  4.1     Chloride  102     Calcium  9.6     Total Protein  6.9     Albumin  4.1     Total Bilirubin  0.3     Alkaline Phosphatase  112     AST (SGOT)  22     ALT (SGPT)  11     WBC 6.89  7.1     Hemoglobin 12.2  12.2     Hematocrit 37.2  37.5     Platelets 200  177     Total Cholesterol 154      Triglycerides 121      HDL Cholesterol 54      LDL Cholesterol  79      PSA  <0.1     Uric Acid   5.0          Details          This result is from an external source.             Lipid Panel          3/29/2024    11:51   Lipid Panel   Total Cholesterol 154    Triglycerides 121    HDL Cholesterol 54    VLDL Cholesterol 21    LDL Cholesterol  79    LDL/HDL Ratio 1.40      TSH          3/29/2024    11:51   TSH   TSH 1.690          Lab Results   Component Value Date    CGCF83KC 58.8 03/29/2024    FOLATE 18.70 03/29/2024      URIC ACID (08/09/2024 08:20) --5  SEDIMENTATION RATE (08/09/2024 08:19) --47  C-REACTIVE PROTEIN (08/09/2024 08:19) --normal    ToxASSURE Select 13 Discrete - (07/12/2024 10:49)                Assessment and Plan    Diagnoses and all orders for this  visit:    1. Primary hypertension (Primary) --well-controlled  Plan to continue HCTZ and spironolactone as prescribed  Continue to monitor renal function and electrolytes  -     Comprehensive Metabolic Panel    2. Chronic venous stasis --much improved  No change with HCTZ 12.5 mg daily and Aldactone 50 mg daily    3. Mixed hyperlipidemia --controlled  Due to recheck 6-month fasting labs and LFTs.  Ideally goal LDL needs to be maintained less than 70 given history of carotid stenosis.  If at goal plan to continue Crestor 20 mg daily  Needs low-carb/low calorie/low cholesterol diet and increase cardio exercise to greater than 150 minutes weekly  -     Comprehensive Metabolic Panel  -     Lipid Panel With LDL / HDL Ratio    4. Stenosis of carotid artery, unspecified laterality --stable  Carotid ultrasound up-to-date, no change.  Continue with aggressive lipid control, BP control.  See above plan    5. Anxiety with depression --controlled  Doing much better with low-dose Cymbalta 30 mg daily  Even given recent scheduling errors, handled things well.    6. Other emphysema --stable  No change with MDIs    7. Obstructive sleep apnea syndrome --good compliance with CPAP    8. Stage 3b chronic kidney disease --fair control  Some slight decline with renal function at last visit 3 months ago.  Recheck today, provided no further decline with CKD then may continue yearly follow-up only with nephrologist/Dr. Romo.  Otherwise may need to be seen sooner.  Needs to remain off NSAIDs.  -     Comprehensive Metabolic Panel    9. Mild chronic anemia --stable.  Asymptomatic.  Most likely secondary to chronic CKD  -     CBC & Differential    10. Degenerative lumbar spinal stenosis --controlled  Urine Tox screen and controlled substance contract up-to-date/October 2024 appropriate.  LIZZY/ INSPECT report reviewed, appropriate.  Adequate pain relief, good medication adherence, and low risk behaviors for misuse.  Continue/refill Norco as  directed below, quantity 4/day, next refill due 11/17/2024  -     HYDROcodone-acetaminophen (NORCO)  MG per tablet; Take 1 tablet by mouth Every 6 (Six) Hours As Needed for Moderate Pain.  Dispense: 120 tablet; Refill: 0    11. Seronegative rheumatoid arthritis --stable  Per rheumatologist/Dr. Wood.  Records reviewed.  Labs/inflammatory markers recently completed, stable.  No changes were made to his Arava.    12. Weight loss --relatively stable.  Asymptomatic.  All screening up-to-date.  Suspect this is multifactorial, but most likely due to significant improvement with chronic venous stasis/edema.  Doing well.  Continue to monitor.    13. Medically complex patient -- See all the above active chronic diagnoses that require close monitoring and longitudinal care.      I spent 40 minutes caring for Kike on this date of service. This time includes time spent by me in the following activities:preparing for the visit, reviewing tests, obtaining and/or reviewing a separately obtained history, performing a medically appropriate examination and/or evaluation , counseling and educating the patient/family/caregiver, ordering medications, tests, or procedures, documenting information in the medical record, independently interpreting results and communicating that information with the patient/family/caregiver, care coordination, and education, counseling, and management of multiple chronic active diagnoses in a medically complex patient.  Follow Up   Return in about 3 months (around 2/4/2025) for Recheck.  Patient was given instructions and counseling regarding his condition or for health maintenance advice. Please see specific information pulled into the AVS if appropriate.

## 2024-11-05 LAB
ALBUMIN SERPL-MCNC: 4.1 G/DL (ref 3.7–4.7)
ALP SERPL-CCNC: 116 IU/L (ref 44–121)
ALT SERPL-CCNC: 12 IU/L (ref 0–44)
AST SERPL-CCNC: 23 IU/L (ref 0–40)
BASOPHILS # BLD AUTO: 0.1 X10E3/UL (ref 0–0.2)
BASOPHILS NFR BLD AUTO: 1 %
BILIRUB SERPL-MCNC: 0.3 MG/DL (ref 0–1.2)
BUN SERPL-MCNC: 26 MG/DL (ref 8–27)
BUN/CREAT SERPL: 14 (ref 10–24)
CALCIUM SERPL-MCNC: 9.4 MG/DL (ref 8.6–10.2)
CHLORIDE SERPL-SCNC: 101 MMOL/L (ref 96–106)
CHOLEST SERPL-MCNC: 140 MG/DL (ref 100–199)
CO2 SERPL-SCNC: 24 MMOL/L (ref 20–29)
CREAT SERPL-MCNC: 1.85 MG/DL (ref 0.76–1.27)
EGFRCR SERPLBLD CKD-EPI 2021: 35 ML/MIN/1.73
EOSINOPHIL # BLD AUTO: 0.5 X10E3/UL (ref 0–0.4)
EOSINOPHIL NFR BLD AUTO: 8 %
ERYTHROCYTE [DISTWIDTH] IN BLOOD BY AUTOMATED COUNT: 12.6 % (ref 11.6–15.4)
GLOBULIN SER CALC-MCNC: 2.6 G/DL (ref 1.5–4.5)
GLUCOSE SERPL-MCNC: 112 MG/DL (ref 70–99)
HCT VFR BLD AUTO: 36.6 % (ref 37.5–51)
HDLC SERPL-MCNC: 39 MG/DL
HGB BLD-MCNC: 12 G/DL (ref 13–17.7)
IMM GRANULOCYTES # BLD AUTO: 0 X10E3/UL (ref 0–0.1)
IMM GRANULOCYTES NFR BLD AUTO: 0 %
LDLC SERPL CALC-MCNC: 67 MG/DL (ref 0–99)
LDLC/HDLC SERPL: 1.7 RATIO (ref 0–3.6)
LYMPHOCYTES # BLD AUTO: 1.1 X10E3/UL (ref 0.7–3.1)
LYMPHOCYTES NFR BLD AUTO: 15 %
MCH RBC QN AUTO: 33.8 PG (ref 26.6–33)
MCHC RBC AUTO-ENTMCNC: 32.8 G/DL (ref 31.5–35.7)
MCV RBC AUTO: 103 FL (ref 79–97)
MONOCYTES # BLD AUTO: 0.9 X10E3/UL (ref 0.1–0.9)
MONOCYTES NFR BLD AUTO: 13 %
NEUTROPHILS # BLD AUTO: 4.4 X10E3/UL (ref 1.4–7)
NEUTROPHILS NFR BLD AUTO: 63 %
PLATELET # BLD AUTO: 183 X10E3/UL (ref 150–450)
POTASSIUM SERPL-SCNC: 4.6 MMOL/L (ref 3.5–5.2)
PROT SERPL-MCNC: 6.7 G/DL (ref 6–8.5)
RBC # BLD AUTO: 3.55 X10E6/UL (ref 4.14–5.8)
SODIUM SERPL-SCNC: 139 MMOL/L (ref 134–144)
TRIGL SERPL-MCNC: 204 MG/DL (ref 0–149)
VLDLC SERPL CALC-MCNC: 34 MG/DL (ref 5–40)
WBC # BLD AUTO: 7 X10E3/UL (ref 3.4–10.8)

## 2024-12-17 DIAGNOSIS — M48.061 DEGENERATIVE LUMBAR SPINAL STENOSIS: ICD-10-CM

## 2024-12-17 NOTE — TELEPHONE ENCOUNTER
Rx Refill Note  Requested Prescriptions     Pending Prescriptions Disp Refills    HYDROcodone-acetaminophen (NORCO)  MG per tablet 120 tablet 0     Sig: Take 1 tablet by mouth Every 6 (Six) Hours As Needed for Moderate Pain.      Last office visit with prescribing clinician: 11/4/2024   Last telemedicine visit with prescribing clinician: Visit date not found   Next office visit with prescribing clinician: 2/14/2025                         Would you like a call back once the refill request has been completed: [] Yes [] No    If the office needs to give you a call back, can they leave a voicemail: [] Yes [] No    Donte aRmires CMA/LMR  12/17/24, 17:23 EST

## 2024-12-20 RX ORDER — HYDROCODONE BITARTRATE AND ACETAMINOPHEN 10; 325 MG/1; MG/1
1 TABLET ORAL EVERY 6 HOURS PRN
Qty: 120 TABLET | Refills: 0 | Status: SHIPPED | OUTPATIENT
Start: 2024-12-20

## 2025-01-07 DIAGNOSIS — L81.9 PIGMENTED SKIN LESION SUSPICIOUS FOR MALIGNANT NEOPLASM: Primary | ICD-10-CM

## 2025-01-07 DIAGNOSIS — R63.4 WEIGHT LOSS: Primary | ICD-10-CM

## 2025-01-07 DIAGNOSIS — R63.0 NO APPETITE: ICD-10-CM

## 2025-01-07 DIAGNOSIS — K21.9 GASTROESOPHAGEAL REFLUX DISEASE, UNSPECIFIED WHETHER ESOPHAGITIS PRESENT: ICD-10-CM

## 2025-01-07 DIAGNOSIS — R11.0 NAUSEA: ICD-10-CM

## 2025-01-16 DIAGNOSIS — M48.061 DEGENERATIVE LUMBAR SPINAL STENOSIS: ICD-10-CM

## 2025-01-16 NOTE — TELEPHONE ENCOUNTER
Rx Refill Note  Requested Prescriptions     Pending Prescriptions Disp Refills    HYDROcodone-acetaminophen (NORCO)  MG per tablet 120 tablet 0     Sig: Take 1 tablet by mouth Every 6 (Six) Hours As Needed for Moderate Pain.      Last office visit with prescribing clinician: 11/4/2024   Last telemedicine visit with prescribing clinician: Visit date not found   Next office visit with prescribing clinician: 2/14/2025                         Would you like a call back once the refill request has been completed: [] Yes [] No    If the office needs to give you a call back, can they leave a voicemail: [] Yes [] No    Mango Watkins MA  01/16/25, 15:13 EST

## 2025-01-18 RX ORDER — HYDROCODONE BITARTRATE AND ACETAMINOPHEN 10; 325 MG/1; MG/1
1 TABLET ORAL EVERY 6 HOURS PRN
Qty: 120 TABLET | Refills: 0 | Status: SHIPPED | OUTPATIENT
Start: 2025-01-18

## 2025-02-14 ENCOUNTER — OFFICE VISIT (OUTPATIENT)
Dept: FAMILY MEDICINE CLINIC | Facility: CLINIC | Age: 85
End: 2025-02-14
Payer: MEDICARE

## 2025-02-14 VITALS
HEIGHT: 66 IN | DIASTOLIC BLOOD PRESSURE: 72 MMHG | BODY MASS INDEX: 26.58 KG/M2 | WEIGHT: 165.4 LBS | SYSTOLIC BLOOD PRESSURE: 126 MMHG | OXYGEN SATURATION: 98 % | TEMPERATURE: 97.5 F | HEART RATE: 84 BPM

## 2025-02-14 DIAGNOSIS — Z78.9 MEDICALLY COMPLEX PATIENT: ICD-10-CM

## 2025-02-14 DIAGNOSIS — J43.8 OTHER EMPHYSEMA: ICD-10-CM

## 2025-02-14 DIAGNOSIS — I10 PRIMARY HYPERTENSION: ICD-10-CM

## 2025-02-14 DIAGNOSIS — R63.0 POOR APPETITE: ICD-10-CM

## 2025-02-14 DIAGNOSIS — R11.0 CHRONIC NAUSEA: ICD-10-CM

## 2025-02-14 DIAGNOSIS — I87.8 CHRONIC VENOUS STASIS: ICD-10-CM

## 2025-02-14 DIAGNOSIS — R63.4 UNINTENTIONAL WEIGHT LOSS: Primary | ICD-10-CM

## 2025-02-14 DIAGNOSIS — D64.9 MILD CHRONIC ANEMIA: ICD-10-CM

## 2025-02-14 DIAGNOSIS — F41.8 ANXIETY WITH DEPRESSION: ICD-10-CM

## 2025-02-14 DIAGNOSIS — M06.00 SERONEGATIVE RHEUMATOID ARTHRITIS: ICD-10-CM

## 2025-02-14 DIAGNOSIS — N18.32 STAGE 3B CHRONIC KIDNEY DISEASE: ICD-10-CM

## 2025-02-14 DIAGNOSIS — M48.061 DEGENERATIVE LUMBAR SPINAL STENOSIS: ICD-10-CM

## 2025-02-14 DIAGNOSIS — E78.2 MIXED HYPERLIPIDEMIA: ICD-10-CM

## 2025-02-14 DIAGNOSIS — C61 PROSTATE CANCER: ICD-10-CM

## 2025-02-14 RX ORDER — OMEPRAZOLE 40 MG/1
CAPSULE, DELAYED RELEASE ORAL
Qty: 180 CAPSULE | Refills: 1 | Status: SHIPPED | OUTPATIENT
Start: 2025-02-14

## 2025-02-14 RX ORDER — HYDROCODONE BITARTRATE AND ACETAMINOPHEN 10; 325 MG/1; MG/1
1 TABLET ORAL EVERY 6 HOURS PRN
Qty: 120 TABLET | Refills: 0 | Status: SHIPPED | OUTPATIENT
Start: 2025-02-18

## 2025-02-14 RX ORDER — FERROUS SULFATE 325(65) MG
325 TABLET ORAL
COMMUNITY

## 2025-02-14 NOTE — PROGRESS NOTES
Chief Complaint  Hypertension (3 month check up), Degenerative lumbar spinal stenosis, Osteoarthritis, Hyperlipidemia, Anxiety, Depression, Chronic Kidney Disease, Emphysema, and Weight Loss (Unintentional weight loss no appetite with nausea daughter sent message to get referral to GI)    Regularly scheduled 3-month follow-up for chronic controlled med refills/lumbar spinal stenosis, CKD, mild anemia  And  New complaints of unintentional weight loss, chronic nausea, poor appetite    Last visit with me in November 2024 for chronic med refills with labs.  --Noted to have some weight loss at that visit about 3 to 4 pounds, but asymptomatic and suspected this to be due to much improved lower extremity edema control.  -- Routine 6-month labs done, all stable.  No med changes made.  Refills completed.    Other interval history since last seen by me --- maintains regular follow-up with his rheumatologist, dermatologist, and hand specialist.   Last seen rheumatologist/Dr. Wood about 3 months ago on 11/13/2025 --- no changes with meds.  Most recently seen his hand specialist/Dr. Carrasquillo last month on 1/7/2025 --follow-up on carpal tunnel.    Received in urgent patient message from his daughter/Nitza approximately 6 WEEKS AGO  Patient Message with Kina Morin MD (01/03/2025) --- requesting an urgent updated referral to his dermatologist and requesting a referral to GI due to weight loss, nausea, poor appetite.  Referrals completed, but specifically told really should be seen sooner here for further evaluation of concerning symptoms.  See message sent by Donte/Donte Thao, JENNIFFER/LMR to Kike Rubio         1/7/25 12:59 PM  Kirsty we have sent in referral for dermatologist and gastro he probably needs to be ssen sooner if more GI issues I am sure it will take a while to get that gastro referral unfortunately     Currently, doing okay except for GI concerns.  Significant weight loss in the last few months.  Has lost an  "additional 12 pounds since last visit 3 months ago and approximately 30 pounds in the last year.  Has attributed this in the past to \"getting older and decreased taste buds?\"  Now, blames this on poor appetite and chronic nausea.  But states it is getting worse.  No abdominal pain, no change in bowel movements.  No NSAID use.  Does take omeprazole 40 mg a day for known GERD.  He does not seem too concerned because \"states it just may be his time?\"  Does have an appointment in the next few weeks with [facility] GI specialist.    Otherwise, doing okay.  No recent ER visits or urgent care visits.  Sleep and fatigue have improved since getting new equipment for CPAP and starting B12.  Mood has improved.  Still tries to maintain a healthy diet but no exercise due to immobility.  No chest pain, SOA, or increased edema.  No abdominal pain, nausea or vomiting.  No recent COPD exacerbations.     Needs chronic med refills.  Fasting labs are up-to-date.  Compliant with and tolerates all medications without side effects.     Still maintained on Norco 10 mg a quantity of 4/day chronic diagnosis of significant lumbar spinal stenosis, severe hip OA, and general RA.  This allows adequate pain relief, enjoyment in life, and provides the ability to maintain general activity level.  There has been no change in history that would increase the risk of overdose or other adverse events.  No NSAIDs due to CKD.       Review of Systems   Constitutional:  Negative for fever and unexpected weight change.   Respiratory:  Negative for cough and shortness of breath.    Cardiovascular:  Negative for chest pain.        Subjective          Kike Rubio presents to [facility]    Objective   Vital Signs:   Vitals:    02/14/25 1004   BP: 126/72   BP Location: Right arm   Patient Position: Sitting   Cuff Size: Adult   Pulse: 84   Temp: 97.5 °F (36.4 °C)   SpO2: 98%   Weight: 75 kg (165 lb 6.4 oz)   Height: 167.6 cm (66\")    "   Body mass index is 26.7 kg/m².   Physical Exam  Vitals and nursing note reviewed.   Constitutional:       General: He is not in acute distress.     Appearance: Normal appearance. He is well-developed and normal weight. He is not ill-appearing or toxic-appearing.   HENT:      Head: Normocephalic and atraumatic.      Nose: Nose normal.   Eyes:      Conjunctiva/sclera: Conjunctivae normal.      Pupils: Pupils are equal, round, and reactive to light.   Neck:      Thyroid: No thyromegaly.   Cardiovascular:      Rate and Rhythm: Normal rate and regular rhythm.      Heart sounds: Normal heart sounds. No murmur heard.  Pulmonary:      Effort: Pulmonary effort is normal. No respiratory distress.      Breath sounds: Normal breath sounds. No wheezing or rales.   Abdominal:      General: Abdomen is flat. Bowel sounds are normal. There is no distension.      Palpations: Abdomen is soft. There is no hepatomegaly, splenomegaly or mass.      Tenderness: There is no abdominal tenderness. There is no guarding or rebound.      Hernia: No hernia is present.   Musculoskeletal:         General: Normal range of motion.      Cervical back: Normal range of motion and neck supple.      Right lower leg: No edema.      Left lower leg: No edema.      Comments: Lower extremities --- chronic brawny venous stasis changes, no warmth, no edema   Lymphadenopathy:      Cervical: No cervical adenopathy.   Skin:     General: Skin is warm.   Neurological:      General: No focal deficit present.      Mental Status: He is alert.   Psychiatric:         Mood and Affect: Mood normal.         Behavior: Behavior normal.         Thought Content: Thought content normal.         Judgment: Judgment normal.        Result Review :     Common labs          8/9/2024    08:20 11/4/2024    13:18 2/14/2025    11:04   Common Labs   Glucose  112  88    BUN  26  25    Creatinine  1.85  1.75    Sodium  139  139    Potassium  4.6  5.5    Chloride  101  102    Calcium  9.4   10.1    Albumin  4.1  4.0    Total Bilirubin  0.3  0.2    Alkaline Phosphatase  116  100    AST (SGOT)  23  23    ALT (SGPT)  12  11    WBC  7.0  7.17    Hemoglobin  12.0  12.1    Hematocrit  36.6  36.1    Platelets  183  180    Total Cholesterol  140     Triglycerides  204     HDL Cholesterol  39     LDL Cholesterol   67     Uric Acid 5.0            Details          This result is from an external source.             CBC w/diff          3/29/2024    11:51 7/12/2024    10:49 11/4/2024    13:18   CBC w/Diff   WBC 6.89  7.1  7.0    RBC 3.81  3.81  3.55    Hemoglobin 12.2  12.2  12.0    Hematocrit 37.2  37.5  36.6    MCV 97.6  98  103    MCH 32.0  32.0  33.8    MCHC 32.8  32.5  32.8    RDW 12.7  12.7  12.6    Platelets 200  177  183    Neutrophil Rel % 66.7  59  63    Lymphocyte Rel % 19.4  20  15    Monocyte Rel % 9.1  14  13    Eosinophil Rel % 3.8  6  8    Basophil Rel % 0.7  1  1      Lipid Panel          3/29/2024    11:51 11/4/2024    13:18   Lipid Panel   Total Cholesterol 154  140    Triglycerides 121  204    HDL Cholesterol 54  39    VLDL Cholesterol 21  34    LDL Cholesterol  79  67    LDL/HDL Ratio 1.40  1.7      TSH          3/29/2024    11:51 2/14/2025    11:04   TSH   TSH 1.690  1.700          Lab Results   Component Value Date    PVBW42TB 58.8 03/29/2024    FOLATE 18.70 03/29/2024        PSA DIAGNOSTIC ONLY (07/12/2024 10:49) normal           Assessment and Plan    Diagnoses and all orders for this visit:    1. Unintentional weight loss (Primary) --- new Dx, significant weight loss in the last few months.  Asymptomatic except for chronic nausea and poor appetite.  Does need workup, especially given history of prostate cancer and COPD/past tobacco use  Check CT studies chest, abdomen/pelvis.  Check additional lab work.  Do suspect possible H. Pylori?  Suggest increasing his omeprazole to 40 mg BID  Does have appointment in the next few weeks with [facility] GI specialist.  No recurrent depression.   Significant improvement with chronic edema, initially attributed to some weight loss.  -     CT Chest With Contrast Diagnostic; Future  -     CT Abdomen Pelvis With & Without Contrast; Future  -     CBC & Differential  -     Comprehensive Metabolic Panel  -     TSH  -     H. Pylori Breath Test - Breath, Lung  -     H. Pylori Antigen, Stool - Stool, Per Rectum    2. Chronic nausea --new Dx, see above plan  -     CT Abdomen Pelvis With & Without Contrast; Future  -     H. Pylori Breath Test - Breath, Lung  -     H. Pylori Antigen, Stool - Stool, Per Rectum    3. Poor appetite  -     CT Abdomen Pelvis With & Without Contrast; Future  -     H. Pylori Breath Test - Breath, Lung  -     H. Pylori Antigen, Stool - Stool, Per Rectum    4. Other emphysema --clinically stable with MDIs.  No recent exacerbation.  Check CT chest given significant weight loss.    5. Prostate cancer --in remission?  PSA done about 6 months ago, negative.  Check CT abdomen/pelvis given prostate cancer history and significant weight loss in recent months.    6. Degenerative lumbar spinal stenosis --controlled  No change with Norco, refill as directed below, next refill due 2/18/2025  No NSAIDs due to CKD  Urine Tox screen and controlled substance contract up-to-date/October 2024 appropriate.  LIZZY/ INSPECT report reviewed, appropriate.  Adequate pain relief, good medication adherence, and low risk behaviors for misuse.  -     HYDROcodone-acetaminophen (NORCO)  MG per tablet; Take 1 tablet by mouth Every 6 (Six) Hours As Needed for Moderate Pain.  Dispense: 120 tablet; Refill: 0    7. Stage 3b chronic kidney disease --stable  Continue to avoid NSAIDs.  Continue with monitoring of renal function and CBC    8. Mild chronic anemia --controlled?  Recheck CBC given significant weight loss.  Known CKD.    9. Mixed hyperlipidemia --controlled  Fasting lipids and LFTs up-to-date.  No change with Crestor    10. Primary hypertension --controlled  No  change with HCTZ or Aldactone at this time.  However, may be able to back down on Aldactone given significant improvement with chronic edema.  Await labs, further recommendations to follow  -     Comprehensive Metabolic Panel    11. Chronic venous stasis --much improved  May consider decreasing Aldactone?  Blood pressure at goal    12. Anxiety with depression --controlled  Doing well with Cymbalta 30 mg daily    13. Seronegative rheumatoid arthritis --stable, per rheumatologist  Remains on Arava    14. Medically complex patient -- See all the above active chronic diagnoses that require close monitoring and longitudinal care.    Other orders  -     omeprazole (priLOSEC) 40 MG capsule; One po BID  Dispense: 180 capsule; Refill: 1      I spent 43 minutes caring for Kike on this date of service. This time includes time spent by me in the following activities:preparing for the visit, reviewing tests, obtaining and/or reviewing a separately obtained history, performing a medically appropriate examination and/or evaluation , counseling and educating the patient/family/caregiver, ordering medications, tests, or procedures, documenting information in the medical record, independently interpreting results and communicating that information with the patient/family/caregiver, care coordination, and education, counseling, and management of multiple new and chronic active high risk diagnoses all addressed today.  Follow Up   Return in about 3 months (around 5/14/2025) for Medicare Wellness, Recheck.  Patient was given instructions and counseling regarding his condition or for health maintenance advice. Please see specific information pulled into the AVS if appropriate.

## 2025-02-14 NOTE — PATIENT INSTRUCTIONS
Needs workup for unintentional weight loss, poor appetite, chronic nausea--- check CT abdomen/pelvis, chest, order H. pylori.  Labs.    Has appointment to see GI in the next few weeks.

## 2025-02-15 LAB
ALBUMIN SERPL-MCNC: 4 G/DL (ref 3.5–5.2)
ALBUMIN/GLOB SERPL: 1.3 G/DL
ALP SERPL-CCNC: 100 U/L (ref 39–117)
ALT SERPL-CCNC: 11 U/L (ref 1–41)
AST SERPL-CCNC: 23 U/L (ref 1–40)
BASOPHILS # BLD AUTO: 0.06 10*3/MM3 (ref 0–0.2)
BASOPHILS NFR BLD AUTO: 0.8 % (ref 0–1.5)
BILIRUB SERPL-MCNC: 0.2 MG/DL (ref 0–1.2)
BUN SERPL-MCNC: 25 MG/DL (ref 8–23)
BUN/CREAT SERPL: 14.3 (ref 7–25)
CALCIUM SERPL-MCNC: 10.1 MG/DL (ref 8.6–10.5)
CHLORIDE SERPL-SCNC: 102 MMOL/L (ref 98–107)
CO2 SERPL-SCNC: 28.2 MMOL/L (ref 22–29)
CREAT SERPL-MCNC: 1.75 MG/DL (ref 0.76–1.27)
EGFRCR SERPLBLD CKD-EPI 2021: 37.9 ML/MIN/1.73
EOSINOPHIL # BLD AUTO: 0.22 10*3/MM3 (ref 0–0.4)
EOSINOPHIL NFR BLD AUTO: 3.1 % (ref 0.3–6.2)
ERYTHROCYTE [DISTWIDTH] IN BLOOD BY AUTOMATED COUNT: 12.4 % (ref 12.3–15.4)
GLOBULIN SER CALC-MCNC: 3.1 GM/DL
GLUCOSE SERPL-MCNC: 88 MG/DL (ref 65–99)
HCT VFR BLD AUTO: 36.1 % (ref 37.5–51)
HGB BLD-MCNC: 12.1 G/DL (ref 13–17.7)
IMM GRANULOCYTES # BLD AUTO: 0.02 10*3/MM3 (ref 0–0.05)
IMM GRANULOCYTES NFR BLD AUTO: 0.3 % (ref 0–0.5)
LYMPHOCYTES # BLD AUTO: 1.56 10*3/MM3 (ref 0.7–3.1)
LYMPHOCYTES NFR BLD AUTO: 21.8 % (ref 19.6–45.3)
MCH RBC QN AUTO: 33.3 PG (ref 26.6–33)
MCHC RBC AUTO-ENTMCNC: 33.5 G/DL (ref 31.5–35.7)
MCV RBC AUTO: 99.4 FL (ref 79–97)
MONOCYTES # BLD AUTO: 0.95 10*3/MM3 (ref 0.1–0.9)
MONOCYTES NFR BLD AUTO: 13.2 % (ref 5–12)
NEUTROPHILS # BLD AUTO: 4.36 10*3/MM3 (ref 1.7–7)
NEUTROPHILS NFR BLD AUTO: 60.8 % (ref 42.7–76)
NRBC BLD AUTO-RTO: 0 /100 WBC (ref 0–0.2)
PLATELET # BLD AUTO: 180 10*3/MM3 (ref 140–450)
POTASSIUM SERPL-SCNC: 5.5 MMOL/L (ref 3.5–5.2)
PROT SERPL-MCNC: 7.1 G/DL (ref 6–8.5)
RBC # BLD AUTO: 3.63 10*6/MM3 (ref 4.14–5.8)
SODIUM SERPL-SCNC: 139 MMOL/L (ref 136–145)
TSH SERPL DL<=0.005 MIU/L-ACNC: 1.7 UIU/ML (ref 0.27–4.2)
WBC # BLD AUTO: 7.17 10*3/MM3 (ref 3.4–10.8)

## 2025-02-15 NOTE — PROGRESS NOTES
Renal function stable.  Anemia stable.  Potassium is a little high, this could be lab error?  However given significant improvement with venous stasis/chronic edema, he may decrease his Aldactone from 50 mg to 25 mg daily.  May take 1/2 tablet daily.  As this can also cause elevated potassium.  Confirm no K rich diet.  May place order to recheck BMP at a location close to him in next 5 to 10 days.  Otherwise, proceed with workup and plans as discussed at appointment yesterday

## 2025-02-17 DIAGNOSIS — N18.32 STAGE 3B CHRONIC KIDNEY DISEASE: ICD-10-CM

## 2025-02-17 DIAGNOSIS — I10 PRIMARY HYPERTENSION: ICD-10-CM

## 2025-02-17 DIAGNOSIS — E87.5 HYPERKALEMIA: Primary | ICD-10-CM

## 2025-02-21 RX ORDER — MOMETASONE FUROATE AND FORMOTEROL FUMARATE DIHYDRATE 100; 5 UG/1; UG/1
2 AEROSOL RESPIRATORY (INHALATION) 2 TIMES DAILY
Qty: 39 G | Refills: 3 | Status: SHIPPED | OUTPATIENT
Start: 2025-02-21

## 2025-03-04 LAB — UREA BREATH TEST QL: NEGATIVE

## 2025-03-17 RX ORDER — DULOXETIN HYDROCHLORIDE 30 MG/1
30 CAPSULE, DELAYED RELEASE ORAL DAILY
Qty: 90 CAPSULE | Refills: 3 | Status: SHIPPED | OUTPATIENT
Start: 2025-03-17

## 2025-03-17 RX ORDER — ALLOPURINOL 100 MG/1
100 TABLET ORAL DAILY
Qty: 90 TABLET | Refills: 3 | Status: SHIPPED | OUTPATIENT
Start: 2025-03-17

## 2025-03-17 RX ORDER — SPIRONOLACTONE 50 MG/1
50 TABLET, FILM COATED ORAL DAILY
Qty: 90 TABLET | Refills: 3 | Status: SHIPPED | OUTPATIENT
Start: 2025-03-17

## 2025-03-17 RX ORDER — HYDROCHLOROTHIAZIDE 12.5 MG/1
12.5 TABLET ORAL DAILY
Qty: 90 TABLET | Refills: 3 | Status: SHIPPED | OUTPATIENT
Start: 2025-03-17

## 2025-03-18 DIAGNOSIS — M48.061 DEGENERATIVE LUMBAR SPINAL STENOSIS: ICD-10-CM

## 2025-03-18 NOTE — TELEPHONE ENCOUNTER
Rx Refill Note  Requested Prescriptions     Pending Prescriptions Disp Refills    HYDROcodone-acetaminophen (NORCO)  MG per tablet 120 tablet 0     Sig: Take 1 tablet by mouth Every 6 (Six) Hours As Needed for Moderate Pain.      Last office visit with prescribing clinician: 2/14/2025   Last telemedicine visit with prescribing clinician: Visit date not found   Next office visit with prescribing clinician: 5/23/2025                         Would you like a call back once the refill request has been completed: [] Yes [] No    If the office needs to give you a call back, can they leave a voicemail: [] Yes [] No    Donte Ramires CMA/LMR  03/18/25, 14:22 EDT

## 2025-03-23 RX ORDER — HYDROCODONE BITARTRATE AND ACETAMINOPHEN 10; 325 MG/1; MG/1
1 TABLET ORAL EVERY 6 HOURS PRN
Qty: 120 TABLET | Refills: 0 | Status: SHIPPED | OUTPATIENT
Start: 2025-03-23

## 2025-04-08 RX ORDER — SERTRALINE HYDROCHLORIDE 100 MG/1
100 TABLET, FILM COATED ORAL DAILY
Qty: 90 TABLET | Refills: 1 | Status: SHIPPED | OUTPATIENT
Start: 2025-04-08

## 2025-04-21 DIAGNOSIS — M48.061 DEGENERATIVE LUMBAR SPINAL STENOSIS: ICD-10-CM

## 2025-04-21 NOTE — TELEPHONE ENCOUNTER
Rx Refill Note  Requested Prescriptions     Pending Prescriptions Disp Refills    HYDROcodone-acetaminophen (NORCO)  MG per tablet 120 tablet 0     Sig: Take 1 tablet by mouth Every 6 (Six) Hours As Needed for Moderate Pain.      Last office visit with prescribing clinician: 2/14/2025   Last telemedicine visit with prescribing clinician: Visit date not found   Next office visit with prescribing clinician: 5/23/2025                         Would you like a call back once the refill request has been completed: [] Yes [] No    If the office needs to give you a call back, can they leave a voicemail: [] Yes [] No    Mango Watkins MA  04/21/25, 13:06 EDT

## 2025-04-23 RX ORDER — HYDROCODONE BITARTRATE AND ACETAMINOPHEN 10; 325 MG/1; MG/1
1 TABLET ORAL EVERY 6 HOURS PRN
Qty: 120 TABLET | Refills: 0 | Status: SHIPPED | OUTPATIENT
Start: 2025-04-23

## 2025-04-25 ENCOUNTER — OFFICE VISIT (OUTPATIENT)
Dept: SLEEP MEDICINE | Facility: HOSPITAL | Age: 85
End: 2025-04-25
Payer: MEDICARE

## 2025-04-25 VITALS — HEIGHT: 66 IN | BODY MASS INDEX: 26.52 KG/M2 | OXYGEN SATURATION: 90 % | HEART RATE: 93 BPM | WEIGHT: 165 LBS

## 2025-04-25 DIAGNOSIS — G47.33 OBSTRUCTIVE SLEEP APNEA: Primary | ICD-10-CM

## 2025-04-25 DIAGNOSIS — E66.3 OVERWEIGHT WITH BODY MASS INDEX (BMI) 25.0-29.9: ICD-10-CM

## 2025-04-25 DIAGNOSIS — T88.8XXA MALFUNCTION OF CONTINUOUS POSITIVE AIRWAY PRESSURE (CPAP) OR BILEVEL POSITIVE AIRWAY PRESSURE (BPAP) MACHINE, INITIAL ENCOUNTER: ICD-10-CM

## 2025-04-25 PROCEDURE — G0463 HOSPITAL OUTPT CLINIC VISIT: HCPCS

## 2025-04-25 NOTE — PROGRESS NOTES
"Follow Up [facility] Disorders Center Note     Chief Complaint:  TONYA     Primary Care Physician: Kina Morin MD    Interval History:   The patient is a 84 y.o. male  who was last seen in the sleep lab: 1/19/2024 for split study which showed AHI of 40 advised auto BiPAP.  Was due for replacement machine.  Order was placed for auto BiPAP.  Presents today for follow-up.  Per insurance guidelines needs face-to-face visit to process order for new PAP machine.    Downloaded PAP Data Reviewed For Compliance:  DME is [facility] [employer].  Downloads between 1/23/2025 - 4/22/2025.  Average usage is 6 hours 52 minutes.  Average AHI is 4.5.  Average auto BiPAP pressure is 15.5/11.5 cm H2O    I have reviewed the above results and compared them with the patient's last downloads and reviewed with the patient.    Review of Systems:    A complete review of systems was done and all were negative with the exception of see scanned media    Social History:    Social History     Socioeconomic History    Marital status:    Tobacco Use    Smoking status: Never    Smokeless tobacco: Never   Vaping Use    Vaping status: Never Used   Substance and Sexual Activity    Alcohol use: Yes     Comment: BEER OCCASIONALLY    Drug use: Never    Sexual activity: Defer       Allergies:  Meperidine, Adhesive tape, and Morphine     Medication Review:  Reviewed.      Vital Signs:    Vitals:    04/25/25 0914   Pulse: 93   SpO2: 90%   Weight: 74.8 kg (165 lb)   Height: 167.6 cm (66\")     Body mass index is 26.63 kg/m².    Physical Exam:    Constitutional:  Well developed 84 y.o. male that appears in no apparent distress.  Awake & oriented times 3.  Normal mood with normal recent and remote memory and normal judgement.  Eyes:  Conjunctivae normal.  Oropharynx: Previously, moist mucous membranes.    Self-administered Calvin Sleepiness Scale test results: See scanned media  0-5 Lower normal daytime sleepiness  6-10 Higher normal daytime sleepiness  11-12 Mild, " 13-15 Moderate, & 16-24 Severe excessive daytime sleepiness    Impression:   1. Obstructive sleep apnea    2. Malfunction of continuous positive airway pressure (CPAP) or bilevel positive airway pressure (BPAP) machine, initial encounter    3. Overweight with body mass index (BMI) 25.0-29.9        Obstructive sleep apnea adequately treated with auto BiPAP.  Order placed for new auto BiPAP.    Plan:  Good sleep hygiene measures should be maintained.  Weight loss would be beneficial in this patient who is overweight by Body mass index is 26.63 kg/m²..      After evaluating the patient and assessing results available, the patient is benefiting from the treatment being provided.     The patient will continue auto BiPAP.  After clinical evaluation and review of downloads, I recommend no changes to the patient's pressures.  A new prescription will be sent to the patient's DME.    Caution during activities that require prolonged concentration is strongly advised if sleepiness returns. Changing of PAP supplies regularly is important for effective use. Patient needs to change cushion on the mask or plugs on nasal pillows along with disposable filters once every month and change mask frame, tubing, headgear and Velcro straps every 6 months at the minimum.    I answered all of the patient's questions.  The patient will call for any problems and will follow up in 2 months with new machine.      Mike Leon MD  Sleep Medicine  04/25/25  09:23 EDT

## 2025-05-23 ENCOUNTER — OFFICE VISIT (OUTPATIENT)
Dept: FAMILY MEDICINE CLINIC | Facility: CLINIC | Age: 85
End: 2025-05-23
Payer: MEDICARE

## 2025-05-23 VITALS
BODY MASS INDEX: 28.28 KG/M2 | DIASTOLIC BLOOD PRESSURE: 60 MMHG | WEIGHT: 176 LBS | SYSTOLIC BLOOD PRESSURE: 126 MMHG | HEART RATE: 74 BPM | OXYGEN SATURATION: 97 % | TEMPERATURE: 98.6 F | HEIGHT: 66 IN

## 2025-05-23 DIAGNOSIS — R53.82 CHRONIC FATIGUE: ICD-10-CM

## 2025-05-23 DIAGNOSIS — N18.32 STAGE 3B CHRONIC KIDNEY DISEASE: ICD-10-CM

## 2025-05-23 DIAGNOSIS — E87.5 HYPERKALEMIA: ICD-10-CM

## 2025-05-23 DIAGNOSIS — M48.061 DEGENERATIVE LUMBAR SPINAL STENOSIS: ICD-10-CM

## 2025-05-23 DIAGNOSIS — I87.8 CHRONIC VENOUS STASIS: ICD-10-CM

## 2025-05-23 DIAGNOSIS — R63.4 UNINTENTIONAL WEIGHT LOSS: Primary | ICD-10-CM

## 2025-05-23 DIAGNOSIS — Z78.9 MEDICALLY COMPLEX PATIENT: ICD-10-CM

## 2025-05-23 DIAGNOSIS — G47.33 OBSTRUCTIVE SLEEP APNEA SYNDROME: ICD-10-CM

## 2025-05-23 DIAGNOSIS — D64.9 MILD CHRONIC ANEMIA: ICD-10-CM

## 2025-05-23 RX ORDER — HYDROCODONE BITARTRATE AND ACETAMINOPHEN 10; 325 MG/1; MG/1
1 TABLET ORAL EVERY 6 HOURS PRN
Qty: 120 TABLET | Refills: 0 | Status: SHIPPED | OUTPATIENT
Start: 2025-05-23

## 2025-05-23 RX ORDER — SPIRONOLACTONE 50 MG/1
50 TABLET, FILM COATED ORAL DAILY
Qty: 90 TABLET | Refills: 3 | Status: CANCELLED | OUTPATIENT
Start: 2025-05-23

## 2025-05-23 NOTE — PROGRESS NOTES
Chief Complaint  Hypertension (3 month check up follow up last visit), Hyperlipidemia, Chronic Kidney Disease, Degenerative lumbar spinal stenosis, Anxiety, Depression, and Weight Loss      3-month follow-up on chronic controlled med refills/lumbar spinal stenosis, weight loss, CKD, chronic venous stasis, hyperkalemia, and chronic fatigue    Last visit with me in February 2025 for chronic med refills and concerns about unintentional weight loss, nausea, poor appetite  --Due to unintentional weight loss x several months along with nausea and poor appetite, workup was ordered.  -- Ordered H. pylori, CT chest/abdomen/pelvis  -- Referred to GI for likely need for EGD and C-scope?  Also requested by family/Nitza  -- Increased his PPI to BID dosing  -- Checked additional lab work, see progress notes below      Progress Notes  Kina Morin MD (Physician)  Family Medicine  Renal function stable.  Anemia stable.  Potassium is a little high, this could be lab error?  However given significant improvement with venous stasis/chronic edema, he may decrease his Aldactone from 50 mg to 25 mg daily.  May take 1/2 tablet daily.  As this can also cause elevated potassium.  Confirm no K rich diet.  May place order to recheck BMP at a location close to him in next 5 to 10 days.  Otherwise, proceed with workup and plans as discussed at appointment yesterday        He did decrease the spironolactone to 1/2 tablet daily as requested due to hyperkalemia and much improved chronic dependent edema/venous stasis.    However, never completed CT scans as requested??  H. pylori resulted as negative.  CT Chest With Contrast Diagnostic (02/14/2025 10:51) --never completed  CT Abdomen Pelvis With Contrast (03/12/2025 07:04) never completed    Other interval history since last seen by me  ----he did consult with gastroenterologist and maintains regular follow-up with his rheumatologist, dermatologist, pulmonologist, Ortho/hand specialist, and  nephrologist.  Multiple sets of records reviewed    Last visit with Rheumatologist/DR. BARRY about 3 months ago on 2/20/2025 --no med changes made with Arava.  X-ray studies of hand updated.    Seen in consultation by [facility]/DR. Valentin SHORTLY AFTER LAST VISIT 3 MONTHS AGO on 2/20/2025 --- seen in consultation for unintentional weight loss, nausea, and poor appetite.  Agreed with Dr. Morin's plans for checking H. pylori, CT chest/abdomen/pelvis.  Also agreed with plans for both EGD and C-scope, arrangements were made.      DERMATOLOGY - SCAN - [facility], 2/19/25 (03/25/2025)     Seen by his HAND SPECIALIST LAST MONTH as well/Dr. Carrasquillo on 4/8/2025 --follow-up on carpal tunnel, all stable    SEEN SLEEP MEDICINE SPECIALIST LAST MONTH, Dr. Leon  PULMONARY RESULTS - SCAN - COMPLIANCE SUMMARY REPORT, [facility], 04/22/2025 (04/25/2025) ----just ordered new equipment      Currently, doing much better!  Weight loss has resolved, actually has regained 10 pounds since last visit 3 months ago.  No longer with nausea or poor appetite.  Do not know what?  Why?  Or how?  Nonetheless better.    The only things he has done different is --- no longer taking spironolactone (decided to stop it altogether?)  And completely stopped his omeprazole.    Never completed the CT of chest, abdomen, or pelvis.  Never completed the EGD or C-scope either.       Sleep and fatigue have improved since getting new equipment for CPAP and starting B12.  Mood has improved.  Still tries to maintain a healthy diet but no exercise due to immobility.  No chest pain, SOA, or increased edema.  No abdominal pain, nausea or vomiting.  No recent COPD exacerbations.     Needs chronic med refills.  Due for fasting labs/lipids, but not completely fasting today.  Basic recent lab work completed for weight loss workup at last visit.  Compliant with and tolerates all medications without side effects.     Still maintained  "on Norco 10 mg a quantity of 4/day chronic diagnosis of significant lumbar spinal stenosis, severe hip OA, and general RA.  This allows adequate pain relief, enjoyment in life, and provides the ability to maintain general activity level.  There has been no change in history that would increase the risk of overdose or other adverse events.  No NSAIDs due to CKD.          Review of Systems   Constitutional:  Negative for fever and unexpected weight change.   Respiratory:  Negative for cough and shortness of breath.    Cardiovascular:  Negative for chest pain.        Subjective          Kike Rubio presents to [facility]    Objective   Vital Signs:   Vitals:    05/23/25 1239 05/23/25 1246   BP: 150/60 126/60   BP Location: Left arm    Patient Position: Sitting    Cuff Size: Adult    Pulse: 74    Temp: 98.6 °F (37 °C)    SpO2: 97%    Weight: 79.8 kg (176 lb)    Height: 167.6 cm (66\")       Body mass index is 28.41 kg/m².   Physical Exam  Vitals and nursing note reviewed.   Constitutional:       General: He is not in acute distress.     Appearance: Normal appearance. He is well-developed and normal weight. He is not ill-appearing.   HENT:      Head: Normocephalic and atraumatic.      Nose: Nose normal.   Eyes:      Conjunctiva/sclera: Conjunctivae normal.      Pupils: Pupils are equal, round, and reactive to light.   Neck:      Thyroid: No thyromegaly.   Cardiovascular:      Rate and Rhythm: Normal rate and regular rhythm.      Heart sounds: Normal heart sounds. No murmur heard.  Pulmonary:      Effort: Pulmonary effort is normal. No respiratory distress.      Breath sounds: Normal breath sounds. No wheezing or rales.   Abdominal:      General: Abdomen is flat. Bowel sounds are normal. There is no distension.      Palpations: Abdomen is soft. There is no hepatomegaly, splenomegaly or mass.      Tenderness: There is no abdominal tenderness. There is no guarding or rebound.      Hernia: No " hernia is present.   Musculoskeletal:         General: Normal range of motion.      Cervical back: Normal range of motion and neck supple.      Right lower leg: No edema.      Left lower leg: No edema.   Lymphadenopathy:      Cervical: No cervical adenopathy.   Skin:     General: Skin is warm.   Neurological:      General: No focal deficit present.      Mental Status: He is alert.   Psychiatric:         Mood and Affect: Mood normal.         Behavior: Behavior normal.         Thought Content: Thought content normal.         Judgment: Judgment normal.        Result Review :     Common labs          8/9/2024    08:20 11/4/2024    13:18 2/14/2025    11:04   Common Labs   Glucose  112  88    BUN  26  25    Creatinine  1.85  1.75    Sodium  139  139    Potassium  4.6  5.5    Chloride  101  102    Calcium  9.4  10.1    Albumin  4.1  4.0    Total Bilirubin  0.3  0.2    Alkaline Phosphatase  116  100    AST (SGOT)  23  23    ALT (SGPT)  12  11    WBC  7.0  7.17    Hemoglobin  12.0  12.1    Hematocrit  36.6  36.1    Platelets  183  180    Total Cholesterol  140     Triglycerides  204     HDL Cholesterol  39     LDL Cholesterol   67     Uric Acid 5.0            Details          This result is from an external source.             Lipid Panel          11/4/2024    13:18   Lipid Panel   Total Cholesterol 140    Triglycerides 204    HDL Cholesterol 39    VLDL Cholesterol 34    LDL Cholesterol  67    LDL/HDL Ratio 1.7      TSH          2/14/2025    11:04   TSH   TSH 1.700          Lab Results   Component Value Date    FLGG14JF 58.8 03/29/2024    FOLATE 18.70 03/29/2024      H. Pylori Breath Test - Breath, Lung (03/03/2025 09:34) negative    Basic metabolic panel (02/21/2025 02:00) never completed         Assessment and Plan    Diagnoses and all orders for this visit:    1. Unintentional weight loss (Primary) --resolved   Approximately a 30 pound weight loss over the last 6 months, that was associated with some nausea and poor  appetite at last visit.  Therefore workup was pursued.  Ordered basic screening labs, including H. pylori.  Ordered CT chest/abdomen/pelvis and referred to GI.  All lab work WNL, including negative H. pylori.  Seen GI/[facility] provider/Dr. Arora who agreed with needed workup that was ordered.  Also suggested checking both EGD and C-scope.  However, apparently none of this was completed??  Basically he felt better therefore never completed any of the studies.  Has since regained about 10 pounds since last visit and doing well.  Hold any further workup at this time.    2. Chronic venous stasis --well-controlled  Clinically looks much better.  Doing well off spironolactone.  Plan to continue low-dose HCTZ only    3. Hyperkalemia --mild.  Asymptomatic.  Most likely this was due to spironolactone, which has since been discontinued    4. Chronic fatigue --stable  Doing better since the addition of B12 and new equipment for his TONYA.    5. Obstructive sleep apnea syndrome --good compliance  Just received new equipment for CPAP    6. Mild chronic anemia --stable    7. Stage 3b chronic kidney disease --stable  Continue to avoid NSAIDs, sees nephrologist yearly    8. Degenerative lumbar spinal stenosis --stable  No change with Norco, refill as directed below, next refill due today  Urine Tox screen and controlled substance contract up-to-date/October 2024 appropriate.  LIZZY/ INSPECT report reviewed, appropriate.  Adequate pain relief, good medication adherence, and low risk behaviors for misuse.  -     HYDROcodone-acetaminophen (NORCO)  MG per tablet; Take 1 tablet by mouth Every 6 (Six) Hours As Needed for Moderate Pain.  Dispense: 120 tablet; Refill: 0    9. Medically complex patient --See all the above active chronic diagnoses that require close monitoring and longitudinal care.  High risk med/Norco, every 3 months monitoring    Plan fasting labs at next visit --- lipids, CMP, CBC, PSA still requested given history  of prostate cancer (due after July 2025.)      I spent 41 minutes caring for Kike on this date of service. This time includes time spent by me in the following activities:preparing for the visit, reviewing tests, obtaining and/or reviewing a separately obtained history, performing a medically appropriate examination and/or evaluation , counseling and educating the patient/family/caregiver, ordering medications, tests, or procedures, documenting information in the medical record, care coordination, and education, counseling, and management of multiple chronic active diagnoses all addressed at this visit.  X 8 diagnoses  Follow Up   Return in about 3 months (around 8/23/2025) for Medicare Wellness, Recheck, will need fasting lab.  Patient was given instructions and counseling regarding his condition or for health maintenance advice. Please see specific information pulled into the AVS if appropriate.

## 2025-05-23 NOTE — PATIENT INSTRUCTIONS
Needs low-carb/low calorie/low cholesterol diet and increase cardio exercise to greater than 150 minutes weekly

## 2025-05-29 RX ORDER — ROSUVASTATIN CALCIUM 20 MG/1
20 TABLET, COATED ORAL DAILY
Qty: 90 TABLET | Refills: 3 | Status: SHIPPED | OUTPATIENT
Start: 2025-05-29

## 2025-06-11 ENCOUNTER — TELEPHONE (OUTPATIENT)
Dept: FAMILY MEDICINE CLINIC | Facility: CLINIC | Age: 85
End: 2025-06-11
Payer: MEDICARE

## 2025-06-11 DIAGNOSIS — M79.672 HEEL PAIN, CHRONIC, LEFT: Primary | ICD-10-CM

## 2025-06-11 DIAGNOSIS — G89.29 HEEL PAIN, CHRONIC, LEFT: Primary | ICD-10-CM

## 2025-06-11 NOTE — TELEPHONE ENCOUNTER
Dr Morin, My Dad, Kkie Rubio, has developed an acute pain in the back of his L heel. He is unable to even walk on it. Could you please send him a referral [facility], Dr. Demarcus Weathers please? Also would it be possible for you to send him an rx for diclofenac cream? They had some, and it seems to give him some relief. Please send it to [facility]. Thank you so much. Kirsty

## 2025-06-21 DIAGNOSIS — M48.061 DEGENERATIVE LUMBAR SPINAL STENOSIS: ICD-10-CM

## 2025-06-23 NOTE — TELEPHONE ENCOUNTER
Rx Refill Note  Requested Prescriptions     Pending Prescriptions Disp Refills    HYDROcodone-acetaminophen (NORCO)  MG per tablet 120 tablet 0     Sig: Take 1 tablet by mouth Every 6 (Six) Hours As Needed for Moderate Pain.      Last office visit with prescribing clinician: 5/23/2025   Last telemedicine visit with prescribing clinician: Visit date not found   Next office visit with prescribing clinician: 8/29/2025                         Would you like a call back once the refill request has been completed: [] Yes [] No    If the office needs to give you a call back, can they leave a voicemail: [] Yes [] No    Mango Watkins MA  06/23/25, 07:22 EDT

## 2025-06-25 ENCOUNTER — TELEPHONE (OUTPATIENT)
Dept: SLEEP MEDICINE | Facility: HOSPITAL | Age: 85
End: 2025-06-25
Payer: MEDICARE

## 2025-06-25 ENCOUNTER — OFFICE VISIT (OUTPATIENT)
Dept: SLEEP MEDICINE | Facility: HOSPITAL | Age: 85
End: 2025-06-25
Payer: MEDICARE

## 2025-06-25 VITALS — WEIGHT: 179 LBS | HEIGHT: 66 IN | HEART RATE: 94 BPM | OXYGEN SATURATION: 90 % | BODY MASS INDEX: 28.77 KG/M2

## 2025-06-25 DIAGNOSIS — E66.3 OVERWEIGHT WITH BODY MASS INDEX (BMI) 25.0-29.9: ICD-10-CM

## 2025-06-25 DIAGNOSIS — G47.33 OBSTRUCTIVE SLEEP APNEA: Primary | ICD-10-CM

## 2025-06-25 PROCEDURE — G0463 HOSPITAL OUTPT CLINIC VISIT: HCPCS

## 2025-06-25 RX ORDER — HYDROCODONE BITARTRATE AND ACETAMINOPHEN 10; 325 MG/1; MG/1
1 TABLET ORAL EVERY 6 HOURS PRN
Qty: 120 TABLET | Refills: 0 | Status: SHIPPED | OUTPATIENT
Start: 2025-06-25

## 2025-06-25 NOTE — TELEPHONE ENCOUNTER
I called Minerva WHITE and she is going to note account to make sure Pt gets proper filters with cpap order

## 2025-06-25 NOTE — PROGRESS NOTES
"Follow Up [facility] Disorders Center Note     Chief Complaint:  TONYA     Primary Care Physician: Kina Morin MD    Interval History:   The patient is a 84 y.o. male  who was last seen in the sleep lab: 4/25/2025.  Baseline AHI 40; advised auto BiPAP.  Was due for replacement machine.  Presents today for first follow-up since receiving new BiPAP.    Downloaded PAP Data Reviewed For Compliance:  DME is [facility].  Downloads between 5/25/2025 - 6/23/2025.  Average usage is 6 hours 1 minute.  Average AHI is 5.0.  Average auto CPAP pressure is 15.1/11.1 cm H2O    I have reviewed the above results and compared them with the patient's last downloads and reviewed with the patient.    Review of Systems:    A complete review of systems was done and all were negative with the exception of see scanned media    Social History:    Social History     Socioeconomic History    Marital status:    Tobacco Use    Smoking status: Never    Smokeless tobacco: Never   Vaping Use    Vaping status: Never Used   Substance and Sexual Activity    Alcohol use: Yes     Comment: BEER OCCASIONALLY    Drug use: Never    Sexual activity: Defer       Allergies:  Meperidine, Adhesive tape, and Morphine     Medication Review:  Reviewed.      Vital Signs:    Vitals:    06/25/25 0913   Pulse: 94   SpO2: 90%   Weight: 81.2 kg (179 lb)   Height: 167.6 cm (66\")     Body mass index is 28.89 kg/m².    Physical Exam:    Constitutional:  Well developed 84 y.o. male that appears in no apparent distress.  Awake & oriented times 3.  Normal mood with normal recent and remote memory and normal judgement.  Eyes:  Conjunctivae normal.  Oropharynx: Previously, moist mucous membranes.    Self-administered Pingree Sleepiness Scale test results: See scanned media  0-5 Lower normal daytime sleepiness  6-10 Higher normal daytime sleepiness  11-12 Mild, 13-15 Moderate, & 16-24 Severe excessive daytime sleepiness    Impression:   1. Obstructive sleep apnea    2. Overweight " with body mass index (BMI) 25.0-29.9        Obstructive sleep apnea adequately treated with auto BiPAP. The patient appears to be at goal with good compliance and usage. The patient has no complaints of hypersomnolence.    Plan:  Good sleep hygiene measures should be maintained.  Weight loss would be beneficial in this patient who is overweight by Body mass index is 28.89 kg/m²..      After evaluating the patient and assessing results available, the patient is benefiting from the treatment being provided.     The patient will continue BiPAP.  After clinical evaluation and review of downloads, I recommend no changes to the patient's pressures.  A new prescription will be sent to the patient's DME.    Caution during activities that require prolonged concentration is strongly advised if sleepiness returns. Changing of PAP supplies regularly is important for effective use. Patient needs to change cushion on the mask or plugs on nasal pillows along with disposable filters once every month and change mask frame, tubing, headgear and Velcro straps every 6 months at the minimum.    I answered all of the patient's questions.  The patient will call for any problems and will follow up in 1 year.      Mike Leon MD  Sleep Medicine  06/25/25  09:43 EDT

## 2025-07-20 DIAGNOSIS — M48.061 DEGENERATIVE LUMBAR SPINAL STENOSIS: ICD-10-CM

## 2025-07-21 NOTE — TELEPHONE ENCOUNTER
Rx Refill Note  Requested Prescriptions     Pending Prescriptions Disp Refills    HYDROcodone-acetaminophen (NORCO)  MG per tablet 120 tablet 0     Sig: Take 1 tablet by mouth Every 6 (Six) Hours As Needed for Moderate Pain.      Last office visit with prescribing clinician: 5/23/2025   Last telemedicine visit with prescribing clinician: Visit date not found   Next office visit with prescribing clinician: 8/29/2025                         Would you like a call back once the refill request has been completed: [] Yes [] No    If the office needs to give you a call back, can they leave a voicemail: [] Yes [] No    Donte Ramires CMA/LMR  07/21/25, 09:00 EDT

## 2025-07-23 RX ORDER — HYDROCODONE BITARTRATE AND ACETAMINOPHEN 10; 325 MG/1; MG/1
1 TABLET ORAL EVERY 6 HOURS PRN
Qty: 120 TABLET | Refills: 0 | Status: SHIPPED | OUTPATIENT
Start: 2025-07-25

## 2025-08-21 RX ORDER — OMEPRAZOLE 40 MG/1
40 CAPSULE, DELAYED RELEASE ORAL EVERY 12 HOURS SCHEDULED
Qty: 180 CAPSULE | Refills: 1 | OUTPATIENT
Start: 2025-08-21

## 2025-08-29 ENCOUNTER — OFFICE VISIT (OUTPATIENT)
Dept: FAMILY MEDICINE CLINIC | Facility: CLINIC | Age: 85
End: 2025-08-29
Payer: MEDICARE

## 2025-08-29 VITALS
DIASTOLIC BLOOD PRESSURE: 60 MMHG | HEIGHT: 66 IN | OXYGEN SATURATION: 98 % | WEIGHT: 181 LBS | SYSTOLIC BLOOD PRESSURE: 130 MMHG | BODY MASS INDEX: 29.09 KG/M2 | HEART RATE: 78 BPM | TEMPERATURE: 98 F

## 2025-08-29 DIAGNOSIS — I10 PRIMARY HYPERTENSION: ICD-10-CM

## 2025-08-29 DIAGNOSIS — M06.00 SERONEGATIVE RHEUMATOID ARTHRITIS: ICD-10-CM

## 2025-08-29 DIAGNOSIS — M48.061 DEGENERATIVE LUMBAR SPINAL STENOSIS: ICD-10-CM

## 2025-08-29 DIAGNOSIS — E78.2 MIXED HYPERLIPIDEMIA: ICD-10-CM

## 2025-08-29 DIAGNOSIS — Z78.9 MEDICALLY COMPLEX PATIENT: ICD-10-CM

## 2025-08-29 DIAGNOSIS — R63.4 UNINTENTIONAL WEIGHT LOSS: ICD-10-CM

## 2025-08-29 DIAGNOSIS — D64.9 MILD CHRONIC ANEMIA: ICD-10-CM

## 2025-08-29 DIAGNOSIS — Z00.01 ENCOUNTER FOR PREVENTATIVE ADULT HEALTH CARE EXAM WITH ABNORMAL FINDINGS: ICD-10-CM

## 2025-08-29 DIAGNOSIS — I87.8 CHRONIC VENOUS STASIS: ICD-10-CM

## 2025-08-29 DIAGNOSIS — N18.32 STAGE 3B CHRONIC KIDNEY DISEASE: ICD-10-CM

## 2025-08-29 DIAGNOSIS — M16.9 HIP ARTHROSIS: ICD-10-CM

## 2025-08-29 DIAGNOSIS — F41.8 ANXIETY WITH DEPRESSION: ICD-10-CM

## 2025-08-29 DIAGNOSIS — Z00.00 MEDICARE ANNUAL WELLNESS VISIT, SUBSEQUENT: Primary | ICD-10-CM

## 2025-08-29 DIAGNOSIS — C61 PROSTATE CANCER: ICD-10-CM

## 2025-08-29 DIAGNOSIS — Z76.89 ENCOUNTER FOR WHEELCHAIR ASSESSMENT: ICD-10-CM

## 2025-08-29 RX ORDER — FERROUS SULFATE 325(65) MG
325 TABLET ORAL
Status: CANCELLED | OUTPATIENT
Start: 2025-08-29

## 2025-08-29 RX ORDER — HYDROCODONE BITARTRATE AND ACETAMINOPHEN 10; 325 MG/1; MG/1
1 TABLET ORAL EVERY 6 HOURS PRN
Qty: 120 TABLET | Refills: 0 | Status: SHIPPED | OUTPATIENT
Start: 2025-09-01

## 2025-08-30 LAB
ALBUMIN SERPL-MCNC: 3.9 G/DL (ref 3.5–5.2)
ALBUMIN/GLOB SERPL: 1.4 G/DL
ALP SERPL-CCNC: 108 U/L (ref 39–117)
ALT SERPL-CCNC: 15 U/L (ref 1–41)
AST SERPL-CCNC: 27 U/L (ref 1–40)
BASOPHILS # BLD AUTO: 0.05 10*3/MM3 (ref 0–0.2)
BASOPHILS NFR BLD AUTO: 0.9 % (ref 0–1.5)
BILIRUB SERPL-MCNC: 0.3 MG/DL (ref 0–1.2)
BUN SERPL-MCNC: 21 MG/DL (ref 8–23)
BUN/CREAT SERPL: 11.9 (ref 7–25)
CALCIUM SERPL-MCNC: 9.7 MG/DL (ref 8.6–10.5)
CHLORIDE SERPL-SCNC: 104 MMOL/L (ref 98–107)
CHOLEST SERPL-MCNC: 138 MG/DL (ref 0–200)
CO2 SERPL-SCNC: 26.1 MMOL/L (ref 22–29)
CREAT SERPL-MCNC: 1.76 MG/DL (ref 0.76–1.27)
EGFRCR SERPLBLD CKD-EPI 2021: 37.7 ML/MIN/1.73
EOSINOPHIL # BLD AUTO: 0.28 10*3/MM3 (ref 0–0.4)
EOSINOPHIL NFR BLD AUTO: 5.1 % (ref 0.3–6.2)
ERYTHROCYTE [DISTWIDTH] IN BLOOD BY AUTOMATED COUNT: 12.5 % (ref 12.3–15.4)
GLOBULIN SER CALC-MCNC: 2.8 GM/DL
GLUCOSE SERPL-MCNC: 96 MG/DL (ref 65–99)
HCT VFR BLD AUTO: 33.5 % (ref 37.5–51)
HDLC SERPL-MCNC: 50 MG/DL (ref 40–60)
HGB BLD-MCNC: 10.9 G/DL (ref 13–17.7)
IMM GRANULOCYTES # BLD AUTO: 0.01 10*3/MM3 (ref 0–0.05)
IMM GRANULOCYTES NFR BLD AUTO: 0.2 % (ref 0–0.5)
LDLC SERPL CALC-MCNC: 69 MG/DL (ref 0–100)
LDLC/HDLC SERPL: 1.34 {RATIO}
LYMPHOCYTES # BLD AUTO: 1.36 10*3/MM3 (ref 0.7–3.1)
LYMPHOCYTES NFR BLD AUTO: 24.9 % (ref 19.6–45.3)
MCH RBC QN AUTO: 32.7 PG (ref 26.6–33)
MCHC RBC AUTO-ENTMCNC: 32.5 G/DL (ref 31.5–35.7)
MCV RBC AUTO: 100.6 FL (ref 79–97)
MONOCYTES # BLD AUTO: 0.75 10*3/MM3 (ref 0.1–0.9)
MONOCYTES NFR BLD AUTO: 13.7 % (ref 5–12)
NEUTROPHILS # BLD AUTO: 3.02 10*3/MM3 (ref 1.7–7)
NEUTROPHILS NFR BLD AUTO: 55.2 % (ref 42.7–76)
NRBC BLD AUTO-RTO: 0 /100 WBC (ref 0–0.2)
PLATELET # BLD AUTO: 156 10*3/MM3 (ref 140–450)
POTASSIUM SERPL-SCNC: 4.6 MMOL/L (ref 3.5–5.2)
PROT SERPL-MCNC: 6.7 G/DL (ref 6–8.5)
PSA SERPL-MCNC: 0.03 NG/ML (ref 0–4)
RBC # BLD AUTO: 3.33 10*6/MM3 (ref 4.14–5.8)
SODIUM SERPL-SCNC: 144 MMOL/L (ref 136–145)
TRIGL SERPL-MCNC: 106 MG/DL (ref 0–150)
VLDLC SERPL CALC-MCNC: 19 MG/DL (ref 5–40)
WBC # BLD AUTO: 5.47 10*3/MM3 (ref 3.4–10.8)